# Patient Record
Sex: MALE | Race: WHITE | Employment: OTHER | URBAN - METROPOLITAN AREA
[De-identification: names, ages, dates, MRNs, and addresses within clinical notes are randomized per-mention and may not be internally consistent; named-entity substitution may affect disease eponyms.]

---

## 2017-01-19 ENCOUNTER — OFFICE VISIT (OUTPATIENT)
Dept: CARDIOLOGY CLINIC | Age: 77
End: 2017-01-19

## 2017-01-19 VITALS
BODY MASS INDEX: 27.16 KG/M2 | WEIGHT: 194 LBS | HEART RATE: 44 BPM | DIASTOLIC BLOOD PRESSURE: 92 MMHG | SYSTOLIC BLOOD PRESSURE: 164 MMHG | HEIGHT: 71 IN

## 2017-01-19 DIAGNOSIS — I42.9 CARDIOMYOPATHY (HCC): ICD-10-CM

## 2017-01-19 DIAGNOSIS — I48.0 PAF (PAROXYSMAL ATRIAL FIBRILLATION) (HCC): ICD-10-CM

## 2017-01-19 DIAGNOSIS — I25.119 CORONARY ARTERY DISEASE INVOLVING NATIVE CORONARY ARTERY OF NATIVE HEART WITH ANGINA PECTORIS (HCC): ICD-10-CM

## 2017-01-19 DIAGNOSIS — E78.2 MIXED HYPERLIPIDEMIA: ICD-10-CM

## 2017-01-19 DIAGNOSIS — I25.9 ISCHEMIC HEART DISEASE: ICD-10-CM

## 2017-01-19 DIAGNOSIS — R00.2 PALPITATIONS: ICD-10-CM

## 2017-01-19 DIAGNOSIS — I38 VHD (VALVULAR HEART DISEASE): ICD-10-CM

## 2017-01-19 DIAGNOSIS — E78.5 HYPERLIPIDEMIA, UNSPECIFIED HYPERLIPIDEMIA TYPE: ICD-10-CM

## 2017-01-19 DIAGNOSIS — Z98.61 S/P PTCA (PERCUTANEOUS TRANSLUMINAL CORONARY ANGIOPLASTY): ICD-10-CM

## 2017-01-19 DIAGNOSIS — I25.10 CORONARY ARTERY DISEASE INVOLVING NATIVE CORONARY ARTERY OF NATIVE HEART WITHOUT ANGINA PECTORIS: Primary | ICD-10-CM

## 2017-01-19 DIAGNOSIS — I47.9 PAROXYSMAL TACHYCARDIA (HCC): ICD-10-CM

## 2017-01-19 PROCEDURE — 99214 OFFICE O/P EST MOD 30 MIN: CPT | Performed by: INTERNAL MEDICINE

## 2017-01-19 RX ORDER — FLUOXETINE HYDROCHLORIDE 40 MG/1
40 CAPSULE ORAL DAILY
COMMUNITY
Start: 2016-11-27 | End: 2019-06-24 | Stop reason: SDUPTHER

## 2017-01-19 RX ORDER — LOSARTAN POTASSIUM 100 MG/1
100 TABLET ORAL DAILY
Qty: 30 TABLET | Refills: 5 | Status: SHIPPED | OUTPATIENT
Start: 2017-01-19 | End: 2017-07-29 | Stop reason: SDUPTHER

## 2017-01-23 LAB
ALBUMIN SERPL-MCNC: 4.1 G/DL
ALP BLD-CCNC: 53 U/L
ALT SERPL-CCNC: 21 U/L
AST SERPL-CCNC: 17 U/L
BILIRUB SERPL-MCNC: 1.1 MG/DL (ref 0.1–1.4)
BUN BLDV-MCNC: NORMAL MG/DL
CALCIUM SERPL-MCNC: 8.8 MG/DL
CHLORIDE BLD-SCNC: 103 MMOL/L
CO2: 25 MMOL/L
CREAT SERPL-MCNC: 1 MG/DL
GFR CALCULATED: NORMAL
GLUCOSE BLD-MCNC: 125 MG/DL
POTASSIUM SERPL-SCNC: 4 MMOL/L
SODIUM BLD-SCNC: 137 MMOL/L
TOTAL PROTEIN: 7.4

## 2017-02-02 ENCOUNTER — NURSE ONLY (OUTPATIENT)
Dept: CARDIOLOGY CLINIC | Age: 77
End: 2017-02-02

## 2017-02-02 ENCOUNTER — TELEPHONE (OUTPATIENT)
Dept: CARDIOLOGY CLINIC | Age: 77
End: 2017-02-02

## 2017-02-02 VITALS
DIASTOLIC BLOOD PRESSURE: 74 MMHG | HEIGHT: 71 IN | WEIGHT: 198.8 LBS | HEART RATE: 60 BPM | BODY MASS INDEX: 27.83 KG/M2 | SYSTOLIC BLOOD PRESSURE: 118 MMHG

## 2017-02-02 DIAGNOSIS — I10 ESSENTIAL HYPERTENSION: Primary | ICD-10-CM

## 2017-02-02 PROCEDURE — 99211 OFF/OP EST MAY X REQ PHY/QHP: CPT | Performed by: INTERNAL MEDICINE

## 2017-02-09 ENCOUNTER — TELEPHONE (OUTPATIENT)
Dept: CARDIOLOGY CLINIC | Age: 77
End: 2017-02-09

## 2017-02-10 ENCOUNTER — TELEPHONE (OUTPATIENT)
Dept: CARDIOLOGY CLINIC | Age: 77
End: 2017-02-10

## 2017-02-27 ENCOUNTER — TELEPHONE (OUTPATIENT)
Dept: CARDIOLOGY CLINIC | Age: 77
End: 2017-02-27

## 2017-06-16 ENCOUNTER — HOSPITAL ENCOUNTER (OUTPATIENT)
Dept: SLEEP CENTER | Age: 77
Discharge: HOME | End: 2017-06-16
Attending: FAMILY MEDICINE | Admitting: FAMILY MEDICINE

## 2017-06-25 ENCOUNTER — HOSPITAL ENCOUNTER (OUTPATIENT)
Dept: SLEEP CENTER | Age: 77
Discharge: HOME | End: 2017-06-25
Attending: INTERNAL MEDICINE | Admitting: INTERNAL MEDICINE

## 2017-07-10 ENCOUNTER — OFFICE VISIT (OUTPATIENT)
Dept: CARDIOLOGY CLINIC | Age: 77
End: 2017-07-10

## 2017-07-10 VITALS
HEART RATE: 52 BPM | SYSTOLIC BLOOD PRESSURE: 136 MMHG | WEIGHT: 201.4 LBS | HEIGHT: 71 IN | DIASTOLIC BLOOD PRESSURE: 80 MMHG | BODY MASS INDEX: 28.19 KG/M2

## 2017-07-10 DIAGNOSIS — E78.2 MIXED HYPERLIPIDEMIA: ICD-10-CM

## 2017-07-10 DIAGNOSIS — I42.9 CARDIOMYOPATHY (HCC): ICD-10-CM

## 2017-07-10 DIAGNOSIS — I48.0 PAF (PAROXYSMAL ATRIAL FIBRILLATION) (HCC): ICD-10-CM

## 2017-07-10 DIAGNOSIS — I25.9 ISCHEMIC HEART DISEASE: ICD-10-CM

## 2017-07-10 DIAGNOSIS — I38 VHD (VALVULAR HEART DISEASE): ICD-10-CM

## 2017-07-10 DIAGNOSIS — Z98.61 S/P PTCA (PERCUTANEOUS TRANSLUMINAL CORONARY ANGIOPLASTY): ICD-10-CM

## 2017-07-10 DIAGNOSIS — I25.10 CORONARY ARTERY DISEASE INVOLVING NATIVE CORONARY ARTERY OF NATIVE HEART WITHOUT ANGINA PECTORIS: Primary | ICD-10-CM

## 2017-07-10 PROCEDURE — 99213 OFFICE O/P EST LOW 20 MIN: CPT | Performed by: INTERNAL MEDICINE

## 2017-07-10 RX ORDER — FINASTERIDE 5 MG/1
5 TABLET, FILM COATED ORAL DAILY
COMMUNITY
End: 2019-06-24 | Stop reason: SDUPTHER

## 2017-07-14 ENCOUNTER — HOSPITAL ENCOUNTER (OUTPATIENT)
Dept: SLEEP CENTER | Age: 77
Discharge: HOME | End: 2017-07-14
Attending: INTERNAL MEDICINE | Admitting: INTERNAL MEDICINE

## 2017-07-31 RX ORDER — LOSARTAN POTASSIUM 100 MG/1
TABLET ORAL
Qty: 30 TABLET | Refills: 4 | Status: SHIPPED | OUTPATIENT
Start: 2017-07-31 | End: 2018-03-07 | Stop reason: SDUPTHER

## 2017-10-16 LAB
ALBUMIN SERPL-MCNC: 4.5 G/DL
ALP BLD-CCNC: 50 U/L
ALT SERPL-CCNC: 15 U/L
ANION GAP SERPL CALCULATED.3IONS-SCNC: NORMAL MMOL/L
AST SERPL-CCNC: 15 U/L
BILIRUB SERPL-MCNC: 0.7 MG/DL (ref 0.1–1.4)
BUN BLDV-MCNC: 18 MG/DL
CALCIUM SERPL-MCNC: 9.5 MG/DL
CHLORIDE BLD-SCNC: 103 MMOL/L
CHOLESTEROL, TOTAL: 134 MG/DL
CHOLESTEROL/HDL RATIO: ABNORMAL
CO2: 29 MMOL/L
CREAT SERPL-MCNC: 0.9 MG/DL
GFR CALCULATED: NORMAL
GLUCOSE BLD-MCNC: 118 MG/DL
HDLC SERPL-MCNC: 27 MG/DL (ref 35–70)
LDL CHOLESTEROL CALCULATED: 73 MG/DL (ref 0–160)
POTASSIUM SERPL-SCNC: 4.6 MMOL/L
SODIUM BLD-SCNC: 142 MMOL/L
TOTAL PROTEIN: 7.1
TRIGL SERPL-MCNC: 168 MG/DL
VLDLC SERPL CALC-MCNC: 34 MG/DL

## 2018-01-09 ENCOUNTER — OFFICE VISIT (OUTPATIENT)
Dept: CARDIOLOGY CLINIC | Age: 78
End: 2018-01-09

## 2018-01-09 VITALS
BODY MASS INDEX: 28.64 KG/M2 | SYSTOLIC BLOOD PRESSURE: 150 MMHG | HEART RATE: 58 BPM | WEIGHT: 204.6 LBS | HEIGHT: 71 IN | DIASTOLIC BLOOD PRESSURE: 80 MMHG

## 2018-01-09 DIAGNOSIS — E78.2 MIXED HYPERLIPIDEMIA: ICD-10-CM

## 2018-01-09 DIAGNOSIS — I38 VHD (VALVULAR HEART DISEASE): ICD-10-CM

## 2018-01-09 DIAGNOSIS — I25.5 ISCHEMIC CARDIOMYOPATHY: ICD-10-CM

## 2018-01-09 DIAGNOSIS — I25.10 CORONARY ARTERY DISEASE INVOLVING NATIVE CORONARY ARTERY OF NATIVE HEART WITHOUT ANGINA PECTORIS: Primary | ICD-10-CM

## 2018-01-09 DIAGNOSIS — I25.9 ISCHEMIC HEART DISEASE: ICD-10-CM

## 2018-01-09 DIAGNOSIS — Z98.61 S/P PTCA (PERCUTANEOUS TRANSLUMINAL CORONARY ANGIOPLASTY): ICD-10-CM

## 2018-01-09 DIAGNOSIS — I48.0 PAF (PAROXYSMAL ATRIAL FIBRILLATION) (HCC): ICD-10-CM

## 2018-01-09 PROCEDURE — 99214 OFFICE O/P EST MOD 30 MIN: CPT | Performed by: INTERNAL MEDICINE

## 2018-01-09 NOTE — PROGRESS NOTES
tablet Take 10 mg by mouth daily      atorvastatin (LIPITOR) 10 MG tablet Take 20 mg by mouth daily       levothyroxine (LEVOTHROID) 50 MCG tablet Take 50 mcg by mouth daily.  aspirin 81 MG tablet Take 81 mg by mouth daily. No current facility-administered medications for this visit. Allergies: Lisinopril and Pcn [penicillins]  Past Medical History:   Diagnosis Date    CAD (coronary artery disease)     Cardiac arrhythmia     Cardiomyopathy [facility])     Fall 10/2013    Was walking and missed his step and fell.  H/O cardiovascular stress test 07/08/2016    EF 53% Normal study    H/O echocardiogram 03/11/2013    EF =>55%, abnormal LV diastolic function Stage 1, mild mitral and triuspid regurg,normal LV systolic function, no pericarial effusion.     H/O exercise stress test 05/14/2013    EXERCISE STRESS-negative stress test    History of Holter monitoring 7/14/14    1 episode of a-fib x 11 hours and 58 minutes    Hyperlipidemia     Ischemic heart disease     SILENT    [facility]     Palpitations     S/P PTCA (percutaneous transluminal coronary angioplasty) 11/15/2012    stenting of 2 sites in RCA    Status post angioplasty with stent     VHD (valvular heart disease)      Past Surgical History:   Procedure Laterality Date    CHOLECYSTECTOMY  2009    PTCA  11/2012    TONSILLECTOMY AND ADENOIDECTOMY        As reviewed   Family History   Problem Relation Age of Onset    Tuberculosis Mother     Heart Surgery Father     Pacemaker Father      Social History   Substance Use Topics    Smoking status: Former Smoker     Packs/day: 1.00     Years: 10.00     Quit date: 10/29/1967    Smokeless tobacco: Never Used    Alcohol use 0.0 oz/week      Comment: RARE, 2 cups coffee daily      Review of Systems:    Constitutional: Negative for diaphoresis and fatigue  Psychological:Negative for anxiety or depression  HENT: Negative for headaches, nasal congestion, sinus pain or vertigo  Eyes: Negative for visual disturbance. Endocrine: Negative for polydipsia/polyuria  Respiratory: Negative for shortness of breath  Cardiovascular: Negative for chest pain, dyspnea on exertion, claudication, edema, irregular heartbeat, murmur, palpitations or shortness of breath  Gastrointestinal: Negative for abdominal pain or heartburn  Genito-Urinary: Negative for urinary frequency/urgency  Musculoskeletal: Negative for muscle pain, muscular weakness, negative for pain in arm and leg or swelling in foot and leg  Neurological: Negative for dizziness, headaches, memory loss, numbness/tingling, visual changes, syncope  Dermatological: Negative for rash    Objective:  BP (!) 150/80   Pulse 58   Ht 5' 11\" (1.803 m)   Wt 204 lb 9.6 oz (92.8 kg)   BMI 28.54 kg/m²   Wt Readings from Last 3 Encounters:   01/09/18 204 lb 9.6 oz (92.8 kg)   07/10/17 201 lb 6.4 oz (91.4 kg)   02/02/17 198 lb 12.8 oz (90.2 kg)     Body mass index is 28.54 kg/m². GENERAL - Alert, oriented, pleasant, in no apparent distress. EYES: No jaundice, no conjunctival pallor. SKIN: It is warm & dry. No rashes. No Echhymosis    HEENT  No clinically significant abnormalities seen. Neck - Supple. No jugular venous distention noted. No carotid bruits. Cardiovascular  Normal S1 and S2 without obvious murmur or gallop. Extremities - No cyanosis, clubbing, or significant edema. Pulmonary  No respiratory distress. No wheezes or rales. Abdomen  No masses, tenderness, or organomegaly. Musculoskeletal  No significant edema. No joint deformities. No muscle wasting. Neurologic  Cranial nerves II through XII are grossly intact. There were no gross focal neurologic abnormalities.     Lab Review   No results found for: CKTOTAL, CKMB, CKMBINDEX, TROPONINI  BNP:  No results found for: BNP  PT/INR:    Lab Results   Component Value Date    INR 1.13 11/12/2012     Lab Results   Component Value Date    LABA1C 6.9

## 2018-01-09 NOTE — PATIENT INSTRUCTIONS
CAD:Yes   clinically stable. Patient is on optimal medical regimen ( see medication list above )  -     CORONARY ARTERY DISEASE: Patient is currently  asymptomatic from CAD. - changes in  treatment:   no           - Testing ordered:  no  [facility] classification: 1  [place] RISK SCORE:  ROSALIE RISK SCORE:  HTN: Not well controlled needs medication adjustment. But Patient says BP at home is always good. - changes in  treatment:   no   CARDIOMYOPATHY: None known CONGESTIVE HEART FAILURE: NO KNOWN HISTORY.       VHD: No significant VHD noted  DYSLIPIDEMIA: Patient's profile is at / near Goal.yes,                                 HDL is very low                                Tolerating current medical regimen wellyes,                                                         See most recent Lab values in Labs section above. OTHER RELEVANT DIAGNOSIS:as noted in patient's active problem list:  TESTS ORDERED: None this visit                                              All previously ordered tests reviewed. ARRHYTHMIAS:  Patient has H/O P. Atrial fibrillation                                He is rate controlled & on anticoagulation. Yesi Montero MEDICATIONS: CPM. BP check with nurse in a week with his home BP cuff comparison. Office f/u in six months. Primary/secondary prevention is the goal by aggressive risk modification, healthy and therapeutic life style changes for cardiovascular risk reduction. Various goals are discussed and multiple questions answered.

## 2018-01-16 ENCOUNTER — NURSE ONLY (OUTPATIENT)
Dept: CARDIOLOGY CLINIC | Age: 78
End: 2018-01-16

## 2018-01-16 VITALS
DIASTOLIC BLOOD PRESSURE: 70 MMHG | HEIGHT: 71 IN | WEIGHT: 205.8 LBS | HEART RATE: 58 BPM | BODY MASS INDEX: 28.81 KG/M2 | SYSTOLIC BLOOD PRESSURE: 138 MMHG

## 2018-01-16 DIAGNOSIS — I10 ESSENTIAL HYPERTENSION: Primary | ICD-10-CM

## 2018-01-16 PROCEDURE — 99211 OFF/OP EST MAY X REQ PHY/QHP: CPT | Performed by: INTERNAL MEDICINE

## 2018-01-16 NOTE — PROGRESS NOTES
Dr. Paty Haider reviewed blood pressure. Stated that the patient needs too keep checking on it at home if it goes over 150 and stays over 150 he should call us.

## 2018-03-07 RX ORDER — LOSARTAN POTASSIUM 100 MG/1
100 TABLET ORAL DAILY
Qty: 30 TABLET | Refills: 6 | Status: SHIPPED | OUTPATIENT
Start: 2018-03-07 | End: 2018-09-30 | Stop reason: SDUPTHER

## 2018-06-25 LAB
ALBUMIN SERPL-MCNC: NORMAL G/DL
ALP BLD-CCNC: NORMAL U/L
ALT SERPL-CCNC: NORMAL U/L
ANION GAP SERPL CALCULATED.3IONS-SCNC: 8 MMOL/L
AST SERPL-CCNC: NORMAL U/L
BASOPHILS ABSOLUTE: ABNORMAL /ΜL
BASOPHILS RELATIVE PERCENT: ABNORMAL %
BILIRUB SERPL-MCNC: NORMAL MG/DL (ref 0.1–1.4)
BUN BLDV-MCNC: NORMAL MG/DL
CALCIUM SERPL-MCNC: 8.3 MG/DL
CHLORIDE BLD-SCNC: 97 MMOL/L
CO2: 25 MMOL/L
CREAT SERPL-MCNC: NORMAL MG/DL
EOSINOPHILS ABSOLUTE: ABNORMAL /ΜL
EOSINOPHILS RELATIVE PERCENT: ABNORMAL %
GFR CALCULATED: NORMAL
GLUCOSE BLD-MCNC: NORMAL MG/DL
HCT VFR BLD CALC: 38.1 % (ref 41–53)
HEMOGLOBIN: 12.5 G/DL (ref 13.5–17.5)
LYMPHOCYTES ABSOLUTE: ABNORMAL /ΜL
LYMPHOCYTES RELATIVE PERCENT: ABNORMAL %
MCH RBC QN AUTO: ABNORMAL PG
MCHC RBC AUTO-ENTMCNC: ABNORMAL G/DL
MCV RBC AUTO: ABNORMAL FL
MONOCYTES ABSOLUTE: ABNORMAL /ΜL
MONOCYTES RELATIVE PERCENT: ABNORMAL %
NEUTROPHILS ABSOLUTE: ABNORMAL /ΜL
NEUTROPHILS RELATIVE PERCENT: ABNORMAL %
PLATELET # BLD: 155 K/ΜL
PMV BLD AUTO: 9.9 FL
POTASSIUM SERPL-SCNC: 3.8 MMOL/L
RBC # BLD: 4.29 10^6/ΜL
SODIUM BLD-SCNC: 130 MMOL/L
TOTAL PROTEIN: NORMAL
WBC # BLD: 3.6 10^3/ML

## 2018-08-04 ENCOUNTER — OFFICE VISIT (OUTPATIENT)
Dept: CARDIOLOGY CLINIC | Age: 78
End: 2018-08-04

## 2018-08-04 VITALS
DIASTOLIC BLOOD PRESSURE: 82 MMHG | HEIGHT: 71 IN | BODY MASS INDEX: 28.28 KG/M2 | SYSTOLIC BLOOD PRESSURE: 172 MMHG | HEART RATE: 46 BPM | WEIGHT: 202 LBS

## 2018-08-04 DIAGNOSIS — I25.10 CORONARY ARTERY DISEASE INVOLVING NATIVE HEART WITHOUT ANGINA PECTORIS, UNSPECIFIED VESSEL OR LESION TYPE: Primary | ICD-10-CM

## 2018-08-04 DIAGNOSIS — I25.9 ISCHEMIC HEART DISEASE: ICD-10-CM

## 2018-08-04 DIAGNOSIS — I48.0 PAF (PAROXYSMAL ATRIAL FIBRILLATION) (HCC): ICD-10-CM

## 2018-08-04 DIAGNOSIS — Z98.61 S/P PTCA (PERCUTANEOUS TRANSLUMINAL CORONARY ANGIOPLASTY): ICD-10-CM

## 2018-08-04 DIAGNOSIS — I25.5 ISCHEMIC CARDIOMYOPATHY: ICD-10-CM

## 2018-08-04 DIAGNOSIS — E78.2 MIXED HYPERLIPIDEMIA: ICD-10-CM

## 2018-08-04 PROCEDURE — 93000 ELECTROCARDIOGRAM COMPLETE: CPT | Performed by: INTERNAL MEDICINE

## 2018-08-04 PROCEDURE — 99213 OFFICE O/P EST LOW 20 MIN: CPT | Performed by: INTERNAL MEDICINE

## 2018-08-04 RX ORDER — AMLODIPINE BESYLATE 5 MG/1
5 TABLET ORAL DAILY
Qty: 30 TABLET | Refills: 5 | Status: SHIPPED | OUTPATIENT
Start: 2018-08-04 | End: 2019-01-28 | Stop reason: SDUPTHER

## 2018-08-04 NOTE — PROGRESS NOTES
(REQUIP) 0.5 MG tablet TAKE ONE TABLET BY MOUTH EVERY NIGHT AT BEDTIME 30 tablet 1    rivaroxaban (XARELTO) 20 MG TABS tablet Take 1 tablet by mouth every 24 hours 90 tablet 3    HYDROCODONE BITARTRATE PO Take by mouth       No current facility-administered medications for this visit. Allergies: Lisinopril and Pcn [penicillins]  Past Medical History:   Diagnosis Date    CAD (coronary artery disease)     Cardiac arrhythmia     Cardiomyopathy [facility])     Fall 10/2013    Was walking and missed his step and fell.  H/O cardiovascular stress test 07/08/2016    EF 53% Normal study    H/O echocardiogram 03/11/2013    EF =>55%, abnormal LV diastolic function Stage 1, mild mitral and triuspid regurg,normal LV systolic function, no pericarial effusion.     H/O exercise stress test 05/14/2013    EXERCISE STRESS-negative stress test    History of Holter monitoring 7/14/14    1 episode of a-fib x 11 hours and 58 minutes    Hyperlipidemia     Ischemic heart disease     SILENT    PAF (paroxysmal atrial fibrillation) (HCC)     Palpitations     S/P PTCA (percutaneous transluminal coronary angioplasty) 11/15/2012    stenting of 2 sites in RCA    Status post angioplasty with stent     VHD (valvular heart disease)      Past Surgical History:   Procedure Laterality Date    CHOLECYSTECTOMY  2009    PTCA  11/2012    TONSILLECTOMY AND ADENOIDECTOMY        As reviewed   Family History   Problem Relation Age of Onset    Tuberculosis Mother     Heart Surgery Father     Pacemaker Father      Social History   Substance Use Topics    Smoking status: Former Smoker     Packs/day: 1.00     Years: 10.00     Quit date: 10/29/1967    Smokeless tobacco: Never Used    Alcohol use 0.0 oz/week      Comment: RARE, 2 cups coffee daily      Review of Systems:    Constitutional: Negative for diaphoresis and fatigue  Psychological:Negative for anxiety or depression  HENT: Negative for headaches, nasal congestion, sinus pain or Results   Component Value Date    LABA1C 6.9 12/31/2012     Lab Results   Component Value Date    WBC 3.6 06/25/2018    HCT 38.1 (A) 06/25/2018    MCV 90.4 01/10/2016     06/25/2018     Lab Results   Component Value Date    CHOL 134 10/16/2017    TRIG 168 10/16/2017    HDL 27 (A) 10/16/2017    LDLCALC 73 10/16/2017    LDLDIRECT 75 05/13/2013     Lab Results   Component Value Date    ALT 15 10/16/2017    AST 15 10/16/2017     BMP:    Lab Results   Component Value Date     06/25/2018    K 3.8 06/25/2018    CL 97 06/25/2018    CO2 25 06/25/2018    BUN 18 10/16/2017    CREATININE 0.9 10/16/2017     CMP:   Lab Results   Component Value Date     06/25/2018    K 3.8 06/25/2018    CL 97 06/25/2018    CO2 25 06/25/2018    BUN 18 10/16/2017    PROT 7.0 07/09/2014    PROT 6.9 12/09/2013     TSH:    Lab Results   Component Value Date    TSHHS 6.080 01/10/2016     EKG: Sinus Bradycardia 46 / min. Otherwise normal.    QUALITY MEASURES REVIEWED:  1.CAD:Patient is taking anti platelet agent:Yes  2. DYSLIPIDEMIA: Patient is on cholesterol lowering medication:Yes  3. Beta-Blocker therapy for CAD, if prior Myocardial Infarction:Yes  4. Atrial fibrillation & anticoagulation therapy No  5. Discussed weight management strategies. Impression:    1. Coronary artery disease involving native heart without angina pectoris, unspecified vessel or lesion type    2. Mixed hyperlipidemia    3. Ischemic heart disease    4. Ischemic cardiomyopathy    5. S/P PTCA (percutaneous transluminal coronary angioplasty)    6.  PAF (paroxysmal atrial fibrillation) [facility])       Patient Active Problem List   Diagnosis Code    Hyperlipidemia E78.5    Ischemic heart disease I25.9    Cardiomyopathy ([facility] [address] I42.9    VHD (valvular heart disease) I38    CAD (coronary artery disease) I25.10    S/P PTCA (percutaneous transluminal coronary angioplasty) Z98.61    S/P PTCA (percutaneous transluminal coronary angioplasty) Z98.61    H/O echocardiogram

## 2018-08-04 NOTE — LETTER
Cardiology [address] [place] [id number]  Phone: [phone]  Fax: [phone]    Paulette Bryant MD        August 4, 2018     MD Beatrice Andre [address]    Patient: Alberto Brown  MR Number: [id number]  YOB: 1940  Date of Visit: 8/4/2018    Dear Dr. Karina Short: Thank you for the request for consultation for Jordan Franklin to me for the evaluation of CAD / htn. Below are the relevant portions of my assessment and plan of care. If you have questions, please do not hesitate to call me. I look forward to following Yash Lopez along with you.     Sincerely,        Paulette Bryant MD

## 2018-08-04 NOTE — PATIENT INSTRUCTIONS
CAD:Yes   clinically stable. Patient is on optimal medical regimen ( see medication list above )  -     CORONARY ARTERY DISEASE: Patient is currently  asymptomatic from CAD. - changes in  treatment:   no           - Testing ordered:  no  [facility] classification: 1  [place] RISK SCORE:  ROSALIE RISK SCORE:  HTN:not well controlled on current medical regimen.              - changes in  treatment: yes   CARDIOMYOPATHY: None known   CONGESTIVE HEART FAILURE: NO KNOWN HISTORY.      VHD: No significant VHD noted  DYSLIPIDEMIA: Patient's profile is at / near Goal.yes,                                 HDL is low                                Tolerating current medical regimen wellyes,                                                               See most recent Lab values in Labs section above. OTHER RELEVANT DIAGNOSIS:as noted in patient's active problem list:  TESTS ORDERED:   Echocardiogram                                      All previously ordered tests reviewed. ARRHYTHMIAS: known                                Patient has H/O Atrial fibrillation                                He is rate controlled & on anticoagulation. Patient is in NSR . MEDICATIONS: CPM   Office f/u in six months. Primary/secondary prevention is the goal by aggressive risk modification, healthy and therapeutic life style changes for cardiovascular risk reduction. Various goals are discussed and multiple questions answered.

## 2018-08-20 ENCOUNTER — PROCEDURE VISIT (OUTPATIENT)
Dept: CARDIOLOGY CLINIC | Age: 78
End: 2018-08-20

## 2018-08-20 DIAGNOSIS — I25.5 ISCHEMIC CARDIOMYOPATHY: ICD-10-CM

## 2018-08-20 DIAGNOSIS — Z98.61 S/P PTCA (PERCUTANEOUS TRANSLUMINAL CORONARY ANGIOPLASTY): ICD-10-CM

## 2018-08-20 DIAGNOSIS — E78.2 MIXED HYPERLIPIDEMIA: ICD-10-CM

## 2018-08-20 DIAGNOSIS — I25.10 CORONARY ARTERY DISEASE INVOLVING NATIVE HEART WITHOUT ANGINA PECTORIS, UNSPECIFIED VESSEL OR LESION TYPE: Primary | ICD-10-CM

## 2018-08-20 DIAGNOSIS — I25.9 ISCHEMIC HEART DISEASE: ICD-10-CM

## 2018-08-20 DIAGNOSIS — I48.0 PAF (PAROXYSMAL ATRIAL FIBRILLATION) (HCC): ICD-10-CM

## 2018-08-20 LAB
LV EF: 58 %
LVEF MODALITY: NORMAL

## 2018-08-20 PROCEDURE — 93306 TTE W/DOPPLER COMPLETE: CPT | Performed by: INTERNAL MEDICINE

## 2018-08-21 ENCOUNTER — TELEPHONE (OUTPATIENT)
Dept: CARDIOLOGY CLINIC | Age: 78
End: 2018-08-21

## 2018-10-03 RX ORDER — LOSARTAN POTASSIUM 100 MG/1
100 TABLET ORAL DAILY
Qty: 30 TABLET | Refills: 6 | Status: SHIPPED | OUTPATIENT
Start: 2018-10-03 | End: 2019-06-24 | Stop reason: SDUPTHER

## 2019-01-10 LAB — TSH SERPL DL<=0.05 MIU/L-ACNC: 2.65 UIU/ML

## 2019-01-29 RX ORDER — AMLODIPINE BESYLATE 5 MG/1
5 TABLET ORAL DAILY
Qty: 30 TABLET | Refills: 6 | Status: SHIPPED | OUTPATIENT
Start: 2019-01-29 | End: 2019-08-26 | Stop reason: SDUPTHER

## 2019-02-18 ENCOUNTER — OFFICE VISIT (OUTPATIENT)
Dept: CARDIOLOGY CLINIC | Age: 79
End: 2019-02-18
Payer: MEDICARE

## 2019-02-18 VITALS
HEIGHT: 71 IN | DIASTOLIC BLOOD PRESSURE: 70 MMHG | HEART RATE: 64 BPM | WEIGHT: 203 LBS | SYSTOLIC BLOOD PRESSURE: 132 MMHG | BODY MASS INDEX: 28.42 KG/M2

## 2019-02-18 DIAGNOSIS — Z98.61 S/P PTCA (PERCUTANEOUS TRANSLUMINAL CORONARY ANGIOPLASTY): ICD-10-CM

## 2019-02-18 DIAGNOSIS — I48.0 PAF (PAROXYSMAL ATRIAL FIBRILLATION) (HCC): ICD-10-CM

## 2019-02-18 DIAGNOSIS — E78.2 MIXED HYPERLIPIDEMIA: ICD-10-CM

## 2019-02-18 DIAGNOSIS — I25.9 ISCHEMIC HEART DISEASE: ICD-10-CM

## 2019-02-18 DIAGNOSIS — I25.10 CORONARY ARTERY DISEASE INVOLVING NATIVE CORONARY ARTERY OF NATIVE HEART WITHOUT ANGINA PECTORIS: Primary | ICD-10-CM

## 2019-02-18 DIAGNOSIS — I38 VHD (VALVULAR HEART DISEASE): ICD-10-CM

## 2019-02-18 DIAGNOSIS — I25.5 ISCHEMIC CARDIOMYOPATHY: ICD-10-CM

## 2019-02-18 PROCEDURE — 99214 OFFICE O/P EST MOD 30 MIN: CPT | Performed by: INTERNAL MEDICINE

## 2019-02-18 RX ORDER — MEMANTINE HYDROCHLORIDE 5 MG/1
TABLET ORAL
COMMUNITY
Start: 2019-01-28 | End: 2021-11-08

## 2019-02-18 RX ORDER — ATORVASTATIN CALCIUM 20 MG/1
TABLET, FILM COATED ORAL
COMMUNITY
Start: 2019-01-28 | End: 2019-06-24 | Stop reason: SDUPTHER

## 2019-06-04 DIAGNOSIS — F32.A DEPRESSIVE DISORDER: ICD-10-CM

## 2019-06-04 DIAGNOSIS — R73.9 HYPERGLYCEMIA: ICD-10-CM

## 2019-06-04 DIAGNOSIS — N52.9 IMPOTENCE: ICD-10-CM

## 2019-06-04 DIAGNOSIS — E78.00 HYPERCHOLESTEROLEMIA: ICD-10-CM

## 2019-06-04 DIAGNOSIS — I10 ESSENTIAL HYPERTENSION: ICD-10-CM

## 2019-06-24 ENCOUNTER — OFFICE VISIT (OUTPATIENT)
Dept: FAMILY MEDICINE CLINIC | Age: 79
End: 2019-06-24
Payer: MEDICARE

## 2019-06-24 VITALS
HEART RATE: 64 BPM | HEIGHT: 70 IN | DIASTOLIC BLOOD PRESSURE: 82 MMHG | WEIGHT: 201.6 LBS | BODY MASS INDEX: 28.86 KG/M2 | SYSTOLIC BLOOD PRESSURE: 132 MMHG

## 2019-06-24 DIAGNOSIS — E03.9 ACQUIRED HYPOTHYROIDISM: ICD-10-CM

## 2019-06-24 DIAGNOSIS — E11.9 TYPE 2 DIABETES MELLITUS WITHOUT COMPLICATION, WITHOUT LONG-TERM CURRENT USE OF INSULIN (HCC): Primary | ICD-10-CM

## 2019-06-24 DIAGNOSIS — E78.5 HYPERLIPIDEMIA, UNSPECIFIED HYPERLIPIDEMIA TYPE: ICD-10-CM

## 2019-06-24 DIAGNOSIS — E78.00 PURE HYPERCHOLESTEROLEMIA: ICD-10-CM

## 2019-06-24 DIAGNOSIS — I38 VHD (VALVULAR HEART DISEASE): ICD-10-CM

## 2019-06-24 DIAGNOSIS — I48.0 PAF (PAROXYSMAL ATRIAL FIBRILLATION) (HCC): ICD-10-CM

## 2019-06-24 DIAGNOSIS — R00.2 PALPITATIONS: ICD-10-CM

## 2019-06-24 DIAGNOSIS — I25.119 CORONARY ARTERY DISEASE INVOLVING NATIVE CORONARY ARTERY OF NATIVE HEART WITH ANGINA PECTORIS (HCC): ICD-10-CM

## 2019-06-24 DIAGNOSIS — I10 ESSENTIAL HYPERTENSION: ICD-10-CM

## 2019-06-24 DIAGNOSIS — I25.9 ISCHEMIC HEART DISEASE: ICD-10-CM

## 2019-06-24 DIAGNOSIS — I47.9 PAROXYSMAL TACHYCARDIA (HCC): ICD-10-CM

## 2019-06-24 DIAGNOSIS — E11.9 TYPE 2 DIABETES MELLITUS WITHOUT COMPLICATION, WITHOUT LONG-TERM CURRENT USE OF INSULIN (HCC): ICD-10-CM

## 2019-06-24 DIAGNOSIS — Z98.61 S/P PTCA (PERCUTANEOUS TRANSLUMINAL CORONARY ANGIOPLASTY): ICD-10-CM

## 2019-06-24 DIAGNOSIS — F32.A DEPRESSIVE DISORDER: ICD-10-CM

## 2019-06-24 DIAGNOSIS — I25.5 ISCHEMIC CARDIOMYOPATHY: ICD-10-CM

## 2019-06-24 LAB
A/G RATIO: 1.9 (ref 1.1–2.2)
ALBUMIN SERPL-MCNC: 4.5 G/DL (ref 3.4–5)
ALP BLD-CCNC: 47 U/L (ref 40–129)
ALT SERPL-CCNC: 13 U/L (ref 10–40)
ANION GAP SERPL CALCULATED.3IONS-SCNC: 14 MMOL/L (ref 3–16)
AST SERPL-CCNC: 15 U/L (ref 15–37)
BASOPHILS ABSOLUTE: 0.1 K/UL (ref 0–0.2)
BASOPHILS RELATIVE PERCENT: 1.1 %
BILIRUB SERPL-MCNC: 0.6 MG/DL (ref 0–1)
BUN BLDV-MCNC: 18 MG/DL (ref 7–20)
CALCIUM SERPL-MCNC: 9.3 MG/DL (ref 8.3–10.6)
CHLORIDE BLD-SCNC: 104 MMOL/L (ref 99–110)
CHOLESTEROL, TOTAL: 119 MG/DL (ref 0–199)
CO2: 23 MMOL/L (ref 21–32)
CREAT SERPL-MCNC: 0.9 MG/DL (ref 0.8–1.3)
EOSINOPHILS ABSOLUTE: 0.2 K/UL (ref 0–0.6)
EOSINOPHILS RELATIVE PERCENT: 4.3 %
GFR AFRICAN AMERICAN: >60
GFR NON-AFRICAN AMERICAN: >60
GLOBULIN: 2.4 G/DL
GLUCOSE BLD-MCNC: 142 MG/DL (ref 70–99)
HCT VFR BLD CALC: 40.5 % (ref 40.5–52.5)
HDLC SERPL-MCNC: 31 MG/DL (ref 40–60)
HEMOGLOBIN: 13.3 G/DL (ref 13.5–17.5)
LDL CHOLESTEROL CALCULATED: 58 MG/DL
LYMPHOCYTES ABSOLUTE: 0.7 K/UL (ref 1–5.1)
LYMPHOCYTES RELATIVE PERCENT: 14.7 %
MCH RBC QN AUTO: 30.5 PG (ref 26–34)
MCHC RBC AUTO-ENTMCNC: 32.9 G/DL (ref 31–36)
MCV RBC AUTO: 92.8 FL (ref 80–100)
MONOCYTES ABSOLUTE: 0.4 K/UL (ref 0–1.3)
MONOCYTES RELATIVE PERCENT: 7.7 %
NEUTROPHILS ABSOLUTE: 3.5 K/UL (ref 1.7–7.7)
NEUTROPHILS RELATIVE PERCENT: 72.2 %
PDW BLD-RTO: 14.5 % (ref 12.4–15.4)
PLATELET # BLD: 163 K/UL (ref 135–450)
PMV BLD AUTO: 8.9 FL (ref 5–10.5)
POTASSIUM SERPL-SCNC: 4.2 MMOL/L (ref 3.5–5.1)
RBC # BLD: 4.36 M/UL (ref 4.2–5.9)
SODIUM BLD-SCNC: 141 MMOL/L (ref 136–145)
TOTAL PROTEIN: 6.9 G/DL (ref 6.4–8.2)
TRIGL SERPL-MCNC: 152 MG/DL (ref 0–150)
TSH SERPL DL<=0.05 MIU/L-ACNC: 3.7 UIU/ML (ref 0.27–4.2)
VLDLC SERPL CALC-MCNC: 30 MG/DL
WBC # BLD: 4.8 K/UL (ref 4–11)

## 2019-06-24 PROCEDURE — 99214 OFFICE O/P EST MOD 30 MIN: CPT | Performed by: FAMILY MEDICINE

## 2019-06-24 RX ORDER — FINASTERIDE 5 MG/1
5 TABLET, FILM COATED ORAL DAILY
Qty: 90 TABLET | Refills: 1 | Status: SHIPPED | OUTPATIENT
Start: 2019-06-24 | End: 2020-07-01

## 2019-06-24 RX ORDER — FLUOXETINE HYDROCHLORIDE 40 MG/1
40 CAPSULE ORAL DAILY
Qty: 90 CAPSULE | Refills: 1 | Status: SHIPPED | OUTPATIENT
Start: 2019-06-24 | End: 2019-10-29 | Stop reason: SDUPTHER

## 2019-06-24 RX ORDER — ATORVASTATIN CALCIUM 20 MG/1
20 TABLET, FILM COATED ORAL DAILY
Qty: 90 TABLET | Refills: 1 | Status: SHIPPED | OUTPATIENT
Start: 2019-06-24 | End: 2019-10-29

## 2019-06-24 RX ORDER — LEVOTHYROXINE SODIUM 0.05 MG/1
50 TABLET ORAL DAILY
Qty: 90 TABLET | Refills: 1 | Status: SHIPPED | OUTPATIENT
Start: 2019-06-24 | End: 2019-10-29

## 2019-06-24 RX ORDER — LOSARTAN POTASSIUM 100 MG/1
100 TABLET ORAL DAILY
Qty: 90 TABLET | Refills: 1 | Status: SHIPPED | OUTPATIENT
Start: 2019-06-24 | End: 2019-10-29

## 2019-06-24 ASSESSMENT — ENCOUNTER SYMPTOMS
CHEST TIGHTNESS: 0
SINUS PRESSURE: 0
SHORTNESS OF BREATH: 0
CONSTIPATION: 0
ABDOMINAL PAIN: 0
SORE THROAT: 0
RHINORRHEA: 0
COUGH: 0
DIARRHEA: 0

## 2019-06-24 ASSESSMENT — PATIENT HEALTH QUESTIONNAIRE - PHQ9
SUM OF ALL RESPONSES TO PHQ QUESTIONS 1-9: 0
1. LITTLE INTEREST OR PLEASURE IN DOING THINGS: 0
SUM OF ALL RESPONSES TO PHQ QUESTIONS 1-9: 0
SUM OF ALL RESPONSES TO PHQ9 QUESTIONS 1 & 2: 0
2. FEELING DOWN, DEPRESSED OR HOPELESS: 0

## 2019-06-24 NOTE — PROGRESS NOTES
6/24/2019    Munira Mustafa    Chief Complaint   Patient presents with    6 Month Follow-Up     - no c/o       HPI  Gia Ireland is a 66 y.o. male who presents today with follow-up on marked multiple medical problems. Patient continues with his memory and behavior problems. He had forgotten how to wipe himself. His wife worked with him about him adult wipes and they have worked it out. Wife has multiple other issues such as continual humming continual strumming, she states that she has 2 have 3 word sentences for him to remember commands. Neurology continues to be giving them very helpful insights. Depression appears to be stable. Patient not having any new depressive episodes. Patient states that his sugars have been stable although he has no sugar checks to bring in. Since weight remains very stable. Wife states that he has a very good appetite. Patient feels his blood pressures have been good although he has no home blood pressure checks. REVIEW OF SYMPTOMS  Review of Systems   Constitutional: Negative for chills and fever. HENT: Negative for rhinorrhea, sinus pressure and sore throat. Respiratory: Negative for cough, chest tightness and shortness of breath. Gastrointestinal: Negative for abdominal pain, constipation and diarrhea. Genitourinary: Negative for dysuria and frequency. Musculoskeletal: Negative for myalgias. PAST MEDICAL HISTORY  Past Medical History:   Diagnosis Date    Atrial fibrillation ([facility] [address]     on xarelto    CAD (coronary artery disease)     Cardiomyopathy ([facility] [address]     Depressive disorder     major    Essential hypertension     Fall 10/2013    Was walking and missed his step and fell.  H/O cardiovascular stress test 07/08/2016    EF 53% Normal study    H/O echocardiogram 03/11/2013    EF =>55%, abnormal LV diastolic function Stage 1, mild mitral and triuspid regurg,normal LV systolic function, no pericarial effusion.     H/O exercise stress test Gets together: None     Attends Yazidism service: None     Active member of club or organization: None     Attends meetings of clubs or organizations: None     Relationship status: None    Intimate partner violence:     Fear of current or ex partner: None     Emotionally abused: None     Physically abused: None     Forced sexual activity: None   Other Topics Concern    None   Social History Narrative    None        SURGICAL HISTORY  Past Surgical History:   Procedure Laterality Date    ANUS SURGERY      anal fistula-70's    CHOLECYSTECTOMY  2009    PTCA  11/2012    TONSILLECTOMY AND ADENOIDECTOMY         CURRENT MEDICATIONS  Current Outpatient Medications   Medication Sig Dispense Refill    atorvastatin (LIPITOR) 20 MG tablet Take 1 tablet by mouth daily 90 tablet 1    finasteride (PROSCAR) 5 MG tablet Take 1 tablet by mouth daily 90 tablet 1    FLUoxetine (PROZAC) 40 MG capsule Take 1 capsule by mouth daily 90 capsule 1    levothyroxine (LEVOTHROID) 50 MCG tablet Take 1 tablet by mouth daily 90 tablet 1    losartan (COZAAR) 100 MG tablet Take 1 tablet by mouth daily 90 tablet 1    memantine (NAMENDA) 5 MG tablet       rivaroxaban (XARELTO) 20 MG TABS tablet Take 1 tablet by mouth every 24 hours 90 tablet 3    alfuzosin (UROXATRAL) 10 MG SR tablet Take 10 mg by mouth daily      aspirin 81 MG tablet Take 81 mg by mouth daily.  amLODIPine (NORVASC) 5 MG tablet Take 1 tablet by mouth daily 30 tablet 6     No current facility-administered medications for this visit. ALLERGIES  Allergies   Allergen Reactions    Lisinopril Other (See Comments)     Ace cough.  Pcn [Penicillins] Rash       PHYSICAL EXAM  /82   Pulse 64   Ht 5' 10\" (1.778 m)   Wt 201 lb 9.6 oz (91.4 kg)   BMI 28.93 kg/m²     Physical Exam   Constitutional: He is oriented to person, place, and time. He appears well-developed. HENT:   Head: Normocephalic.    Eyes: Conjunctivae and EOM are normal.   Neck: Neck supple. Cardiovascular: Normal rate, regular rhythm and normal heart sounds. Pulmonary/Chest: Effort normal and breath sounds normal.   Musculoskeletal: Normal range of motion. Neurological: He is alert and oriented to person, place, and time. Skin: Skin is warm and dry. Psychiatric: He has a normal mood and affect. Thought content normal.                ASSESSMENT & PLAN  1. Type 2 diabetes mellitus without complication, without long-term current use of insulin ([address]  Labs today and adjust medications off of results. 2. Essential hypertension  Refill meds. Keep medications the same for now. Asked patient to restart doing home blood pressure checks. 3. Depressive disorder  Continue with behavioral changes recommended through neurology for patient's unique memory and behavior issues. Patient wife seem to be responding well. 4. Pure hypercholesterolemia  Check labs today. Adjust medications off of results. 5. Acquired hypothyroidism  Check labs today. Adjust medications off of results. Refilled meds. To wife's complaints and gave empathy. Return in about 4 months (around 10/24/2019).             Electronically signed by Lety Ohara MD on 6/24/2019

## 2019-06-25 LAB
ESTIMATED AVERAGE GLUCOSE: 139.9 MG/DL
HBA1C MFR BLD: 6.5 %

## 2019-08-27 RX ORDER — AMLODIPINE BESYLATE 5 MG/1
5 TABLET ORAL DAILY
Qty: 30 TABLET | Refills: 5 | Status: SHIPPED | OUTPATIENT
Start: 2019-08-27 | End: 2020-02-24

## 2019-09-24 ENCOUNTER — TELEPHONE (OUTPATIENT)
Dept: FAMILY MEDICINE CLINIC | Age: 79
End: 2019-09-24

## 2019-09-30 ENCOUNTER — OFFICE VISIT (OUTPATIENT)
Dept: CARDIOLOGY CLINIC | Age: 79
End: 2019-09-30
Payer: MEDICARE

## 2019-09-30 VITALS
WEIGHT: 204 LBS | DIASTOLIC BLOOD PRESSURE: 70 MMHG | HEART RATE: 60 BPM | HEIGHT: 71 IN | SYSTOLIC BLOOD PRESSURE: 128 MMHG | BODY MASS INDEX: 28.56 KG/M2

## 2019-09-30 DIAGNOSIS — I48.19 PERSISTENT ATRIAL FIBRILLATION (HCC): ICD-10-CM

## 2019-09-30 DIAGNOSIS — I83.93 VARICOSE VEINS OF BOTH LOWER EXTREMITIES, UNSPECIFIED WHETHER COMPLICATED: ICD-10-CM

## 2019-09-30 DIAGNOSIS — E03.9 ACQUIRED HYPOTHYROIDISM: ICD-10-CM

## 2019-09-30 DIAGNOSIS — I48.0 PAF (PAROXYSMAL ATRIAL FIBRILLATION) (HCC): ICD-10-CM

## 2019-09-30 DIAGNOSIS — E11.9 TYPE 2 DIABETES MELLITUS WITHOUT COMPLICATION, WITHOUT LONG-TERM CURRENT USE OF INSULIN (HCC): ICD-10-CM

## 2019-09-30 DIAGNOSIS — I38 VHD (VALVULAR HEART DISEASE): ICD-10-CM

## 2019-09-30 DIAGNOSIS — I10 ESSENTIAL HYPERTENSION: ICD-10-CM

## 2019-09-30 DIAGNOSIS — I25.10 CORONARY ARTERY DISEASE INVOLVING NATIVE CORONARY ARTERY OF NATIVE HEART WITHOUT ANGINA PECTORIS: Primary | ICD-10-CM

## 2019-09-30 DIAGNOSIS — I25.5 ISCHEMIC CARDIOMYOPATHY: ICD-10-CM

## 2019-09-30 PROCEDURE — 99214 OFFICE O/P EST MOD 30 MIN: CPT | Performed by: INTERNAL MEDICINE

## 2019-09-30 RX ORDER — FINASTERIDE 5 MG/1
5 TABLET, FILM COATED ORAL DAILY
COMMUNITY

## 2019-09-30 RX ORDER — LEVOTHYROXINE SODIUM 0.05 MG/1
50 TABLET ORAL DAILY
COMMUNITY
End: 2019-10-29 | Stop reason: SDUPTHER

## 2019-09-30 RX ORDER — ATORVASTATIN CALCIUM 20 MG/1
20 TABLET, FILM COATED ORAL DAILY
COMMUNITY
End: 2019-10-29 | Stop reason: SDUPTHER

## 2019-09-30 RX ORDER — FLUOXETINE HYDROCHLORIDE 40 MG/1
40 CAPSULE ORAL DAILY
COMMUNITY
End: 2020-01-30 | Stop reason: SDUPTHER

## 2019-09-30 RX ORDER — LOSARTAN POTASSIUM 100 MG/1
100 TABLET ORAL DAILY
COMMUNITY
End: 2019-10-29 | Stop reason: SDUPTHER

## 2019-09-30 NOTE — PROGRESS NOTES
(percutaneous transluminal coronary angioplasty) 11/15/2012    stenting of 2 sites in RCA    Type 2 diabetes mellitus ([facility] [address]     Varicose veins of both lower extremities     VHD (valvular heart disease)      Past Surgical History:   Procedure Laterality Date    ANUS SURGERY      anal fistula-70's    CHOLECYSTECTOMY  2009    PTCA  2012    TONSILLECTOMY AND ADENOIDECTOMY        As reviewed   Family History   Problem Relation Age of Onset    Tuberculosis Mother     Heart Surgery Father     Pacemaker Father     Depression Daughter      Social History     Tobacco Use    Smoking status: Former Smoker     Packs/day: 1.00     Years: 13.00     Pack years: 13.00     Start date:      Last attempt to quit: 10/29/1967     Years since quittin.9    Smokeless tobacco: Never Used   Substance Use Topics    Alcohol use: Yes     Alcohol/week: 0.0 standard drinks     Comment: RARE, 2 cups coffee daily      Review of Systems:    Constitutional: Negative for diaphoresis and fatigue  Psychological:Negative for anxiety or depression  HENT: Negative for headaches, nasal congestion, sinus pain or vertigo  Eyes: Negative for visual disturbance.    Endocrine: Negative for polydipsia/polyuria  Respiratory: Negative for shortness of breath  Cardiovascular: Negative for chest pain, dyspnea on exertion, claudication, edema, irregular heartbeat, murmur, palpitations or shortness of breath  Gastrointestinal: Negative for abdominal pain or heartburn  Genito-Urinary: Negative for urinary frequency/urgency  Musculoskeletal: Negative for muscle pain, muscular weakness, negative for pain in arm and leg or swelling in foot and leg  Neurological: Negative for dizziness, headaches, memory loss, numbness/tingling, visual changes, syncope  Dermatological: Negative for rash    Objective:  /70   Pulse 60   Ht 5' 11\" (1.803 m)   Wt 204 lb (92.5 kg)   BMI 28.45 kg/m²   Wt Readings from Last 3 Encounters:   19 204 lb (92.5 kg)   06/24/19 201 lb 9.6 oz (91.4 kg)   02/18/19 203 lb (92.1 kg)     Body mass index is 28.45 kg/m². GENERAL - Alert, oriented, pleasant, in no apparent distress. EYES: No jaundice, no conjunctival pallor. SKIN: It is warm & dry. No rashes. No Echhymosis    HEENT - No clinically significant abnormalities seen. Neck - Supple. No jugular venous distention noted. No carotid bruits. Cardiovascular - Normal S1 and S2 without obvious murmur or gallop. Extremities - No cyanosis, clubbing, or significant edema. Pulmonary - No respiratory distress. No wheezes or rales. Abdomen - No masses, tenderness, or organomegaly. Musculoskeletal - No significant edema. No joint deformities. No muscle wasting. Neurologic - Cranial nerves II through XII are grossly intact. There were no gross focal neurologic abnormalities.     Lab Review   Lab Results   Component Value Date    TROPONINT <0.010 01/10/2016    TROPONINT <0.010 07/09/2014     BNP:  No results found for: BNP  PT/INR:    Lab Results   Component Value Date    INR 1.13 11/12/2012     Lab Results   Component Value Date    LABA1C 6.5 06/24/2019    LABA1C 6.9 12/31/2012     Lab Results   Component Value Date    WBC 4.8 06/24/2019    WBC 3.6 06/25/2018    HCT 40.5 06/24/2019    HCT 38.1 (A) 06/25/2018    MCV 92.8 06/24/2019    MCV 90.4 01/10/2016     06/24/2019     06/25/2018     Lab Results   Component Value Date    CHOL 119 06/24/2019    CHOL 134 10/16/2017    TRIG 152 (H) 06/24/2019    TRIG 168 10/16/2017    HDL 31 (L) 06/24/2019    HDL 27 (A) 10/16/2017    LDLCALC 58 06/24/2019    LDLCALC 73 10/16/2017    LDLDIRECT 75 05/13/2013    LDLDIRECT 75 11/15/2012     Lab Results   Component Value Date    ALT 13 06/24/2019    ALT 15 10/16/2017    AST 15 06/24/2019    AST 15 10/16/2017     BMP:    Lab Results   Component Value Date     06/24/2019     06/25/2018    K 4.2 06/24/2019    K 3.8 06/25/2018     06/24/2019    CL 97 06/25/2018 CO2 23 06/24/2019    CO2 25 06/25/2018    BUN 18 06/24/2019    BUN 18 10/16/2017    CREATININE 0.9 06/24/2019    CREATININE 0.9 10/16/2017     CMP:   Lab Results   Component Value Date     06/24/2019     06/25/2018    K 4.2 06/24/2019    K 3.8 06/25/2018     06/24/2019    CL 97 06/25/2018    CO2 23 06/24/2019    CO2 25 06/25/2018    BUN 18 06/24/2019    BUN 18 10/16/2017    PROT 6.9 06/24/2019    PROT 7.0 07/09/2014    PROT 6.9 12/09/2013     TSH:    Lab Results   Component Value Date    TSH 3.70 06/24/2019    TSH 2.650 01/10/2019    TSHHS 6.080 01/10/2016       QUALITY MEASURES REVIEWED:  1.CAD:Patient is taking anti platelet agent:Yes  2. DYSLIPIDEMIA: Patient is on cholesterol lowering medication:Yes  3. Beta-Blocker therapy for CAD, if prior Myocardial Infarction:Yes  4. Atrial fibrillation & anticoagulation therapy Yes  5. Discussed weight management strategies. Impression:    1. Coronary artery disease involving native coronary artery of native heart without angina pectoris    2. Ischemic cardiomyopathy    3. VHD (valvular heart disease)    4. PAF (paroxysmal atrial fibrillation) ([facility]    5. Varicose veins of both lower extremities, unspecified whether complicated    6. Type 2 diabetes mellitus without complication, without long-term current use of insulin ([facility])    7. Persistent atrial fibrillation    8. Essential hypertension    9.  Acquired hypothyroidism       Patient Active Problem List   Diagnosis Code    Pure hypercholesterolemia E78.00    Cardiomyopathy ([facility] I42.9    VHD (valvular heart disease) I38    CAD (coronary artery disease) I25.10    H/O echocardiogram Z92.89    H/O exercise stress test Z92.89    PAF (paroxysmal atrial fibrillation) ([facility] I48.0    Primary osteoarthritis involving multiple joints M15.0    Varicose veins of both lower extremities I83.93    Impotence N52.9    Polyp of colon K63.5    Osteopenia M85.80    Type 2 diabetes mellitus ([facility] E11.9   

## 2019-10-16 ENCOUNTER — TELEPHONE (OUTPATIENT)
Dept: FAMILY MEDICINE CLINIC | Age: 79
End: 2019-10-16

## 2019-10-16 DIAGNOSIS — G20 PARKINSON'S DISEASE (HCC): Primary | ICD-10-CM

## 2019-10-29 ENCOUNTER — OFFICE VISIT (OUTPATIENT)
Dept: FAMILY MEDICINE CLINIC | Age: 79
End: 2019-10-29
Payer: MEDICARE

## 2019-10-29 VITALS
HEART RATE: 76 BPM | DIASTOLIC BLOOD PRESSURE: 80 MMHG | SYSTOLIC BLOOD PRESSURE: 140 MMHG | BODY MASS INDEX: 29.23 KG/M2 | WEIGHT: 204.2 LBS | HEIGHT: 70 IN

## 2019-10-29 DIAGNOSIS — I10 ESSENTIAL HYPERTENSION: ICD-10-CM

## 2019-10-29 DIAGNOSIS — Z23 NEEDS FLU SHOT: ICD-10-CM

## 2019-10-29 DIAGNOSIS — E11.9 TYPE 2 DIABETES MELLITUS WITHOUT COMPLICATION, WITHOUT LONG-TERM CURRENT USE OF INSULIN (HCC): Primary | ICD-10-CM

## 2019-10-29 DIAGNOSIS — E11.9 TYPE 2 DIABETES MELLITUS WITHOUT COMPLICATION, WITHOUT LONG-TERM CURRENT USE OF INSULIN (HCC): ICD-10-CM

## 2019-10-29 DIAGNOSIS — E03.9 ACQUIRED HYPOTHYROIDISM: ICD-10-CM

## 2019-10-29 DIAGNOSIS — F32.A DEPRESSIVE DISORDER: ICD-10-CM

## 2019-10-29 DIAGNOSIS — J01.90 ACUTE BACTERIAL SINUSITIS: ICD-10-CM

## 2019-10-29 DIAGNOSIS — B96.89 ACUTE BACTERIAL SINUSITIS: ICD-10-CM

## 2019-10-29 DIAGNOSIS — R41.89 COGNITIVE DEFICITS: ICD-10-CM

## 2019-10-29 LAB
ANION GAP SERPL CALCULATED.3IONS-SCNC: 14 MMOL/L (ref 3–16)
BUN BLDV-MCNC: 18 MG/DL (ref 7–20)
CALCIUM SERPL-MCNC: 9.4 MG/DL (ref 8.3–10.6)
CHLORIDE BLD-SCNC: 102 MMOL/L (ref 99–110)
CO2: 23 MMOL/L (ref 21–32)
CREAT SERPL-MCNC: 0.9 MG/DL (ref 0.8–1.3)
CREATININE URINE: 185.3 MG/DL (ref 39–259)
GFR AFRICAN AMERICAN: >60
GFR NON-AFRICAN AMERICAN: >60
GLUCOSE BLD-MCNC: 151 MG/DL (ref 70–99)
MICROALBUMIN UR-MCNC: 1.6 MG/DL
MICROALBUMIN/CREAT UR-RTO: 8.6 MG/G (ref 0–30)
POTASSIUM SERPL-SCNC: 4.2 MMOL/L (ref 3.5–5.1)
SODIUM BLD-SCNC: 139 MMOL/L (ref 136–145)

## 2019-10-29 PROCEDURE — 90653 IIV ADJUVANT VACCINE IM: CPT | Performed by: FAMILY MEDICINE

## 2019-10-29 PROCEDURE — G0008 ADMIN INFLUENZA VIRUS VAC: HCPCS | Performed by: FAMILY MEDICINE

## 2019-10-29 PROCEDURE — 99214 OFFICE O/P EST MOD 30 MIN: CPT | Performed by: FAMILY MEDICINE

## 2019-10-29 RX ORDER — LEVOTHYROXINE SODIUM 0.05 MG/1
50 TABLET ORAL DAILY
Qty: 90 TABLET | Refills: 1 | Status: SHIPPED | OUTPATIENT
Start: 2019-10-29 | End: 2020-05-07 | Stop reason: SDUPTHER

## 2019-10-29 RX ORDER — FLUOXETINE HYDROCHLORIDE 40 MG/1
40 CAPSULE ORAL DAILY
Qty: 90 CAPSULE | Refills: 1 | Status: SHIPPED | OUTPATIENT
Start: 2019-10-29 | End: 2020-07-01

## 2019-10-29 RX ORDER — ATORVASTATIN CALCIUM 20 MG/1
20 TABLET, FILM COATED ORAL DAILY
Qty: 90 TABLET | Refills: 1 | Status: SHIPPED | OUTPATIENT
Start: 2019-10-29 | End: 2020-10-19

## 2019-10-29 RX ORDER — LOSARTAN POTASSIUM 100 MG/1
100 TABLET ORAL DAILY
Qty: 90 TABLET | Refills: 1 | Status: SHIPPED | OUTPATIENT
Start: 2019-10-29 | End: 2020-09-11 | Stop reason: SDUPTHER

## 2019-10-29 RX ORDER — SULFAMETHOXAZOLE AND TRIMETHOPRIM 800; 160 MG/1; MG/1
1 TABLET ORAL 2 TIMES DAILY
Qty: 14 TABLET | Refills: 0 | Status: SHIPPED | OUTPATIENT
Start: 2019-10-29 | End: 2019-11-05

## 2019-10-29 ASSESSMENT — ENCOUNTER SYMPTOMS
CONSTIPATION: 0
CHEST TIGHTNESS: 0
ABDOMINAL PAIN: 0
DIARRHEA: 0
COUGH: 0
SHORTNESS OF BREATH: 0
RHINORRHEA: 0
SORE THROAT: 0
SINUS PRESSURE: 0

## 2019-10-30 LAB
ESTIMATED AVERAGE GLUCOSE: 139.9 MG/DL
HBA1C MFR BLD: 6.5 %

## 2020-01-30 ENCOUNTER — HOSPITAL ENCOUNTER (OUTPATIENT)
Dept: GENERAL RADIOLOGY | Age: 80
Discharge: HOME | End: 2020-01-30
Payer: MEDICARE

## 2020-01-30 ENCOUNTER — HOSPITAL ENCOUNTER (OUTPATIENT)
Age: 80
Discharge: HOME | End: 2020-01-30
Payer: MEDICARE

## 2020-01-30 ENCOUNTER — OFFICE VISIT (OUTPATIENT)
Dept: FAMILY MEDICINE CLINIC | Age: 80
End: 2020-01-30
Payer: MEDICARE

## 2020-01-30 VITALS
WEIGHT: 209.8 LBS | SYSTOLIC BLOOD PRESSURE: 136 MMHG | HEART RATE: 56 BPM | DIASTOLIC BLOOD PRESSURE: 64 MMHG | OXYGEN SATURATION: 98 % | BODY MASS INDEX: 30.1 KG/M2 | TEMPERATURE: 98.1 F

## 2020-01-30 PROCEDURE — 71046 X-RAY EXAM CHEST 2 VIEWS: CPT

## 2020-01-30 PROCEDURE — 99213 OFFICE O/P EST LOW 20 MIN: CPT | Performed by: NURSE PRACTITIONER

## 2020-01-30 NOTE — PATIENT INSTRUCTIONS
Patient Education        Chronic Cough: Care Instructions  Your Care Instructions    A cough is your body's response to something that bothers your throat or airways. Many things can cause a cough. You might cough because of a cold or the flu, bronchitis, or asthma. Smoking, postnasal drip, allergies, and stomach acid that backs up into your throat also can cause a cough. A cough can be short-term (acute) or long-term (chronic). A chronic cough lasts more than 3 weeks. A chronic cough is often caused by a long-term problem, such as asthma. Another cause might be a medicine, such as an ACE inhibitor. A cough is a symptom, not a disease. To treat a chronic cough, you may need to treat the problem that causes it. You can take a few steps at home to cough less and feel better. Some people cough or clear their throat out of habit for no clear reason. Follow-up care is a key part of your treatment and safety. Be sure to make and go to all appointments, and call your doctor if you are having problems. It's also a good idea to know your test results and keep a list of the medicines you take. How can you care for yourself at home? · Drink plenty of water and other fluids. This may help soothe a dry or sore throat. Honey or lemon juice in hot water or tea may ease a dry cough. · Prop up your head on pillows to help you breathe and ease a cough. · Do not smoke or allow others to smoke around you. Smoke can make a cough worse. If you need help quitting, talk to your doctor about stop-smoking programs and medicines. These can increase your chances of quitting for good. · Avoid exposure to smoke, dust, or other pollutants, or wear a face mask. Check with your doctor or pharmacist to find out which type of face mask will give you the most benefit. · Take cough medicine as directed by your doctor. · Try cough drops to soothe a dry or sore throat. Cough drops don't stop a cough.  Medicine-flavored cough drops are no better than candy-flavored drops or hard candy. Throat clearing  When you have a chronic cough or a disease that may cause this type of cough, you may often feel like you want to clear your throat. This helps bring up mucus. But throat clearing does not always have a cause. Throat clearing can become a habit. The more you do it, the more you feel like you need to do it. But frequent throat clearing can be hard on your vocal cords. It's like slamming them together. To help lessen throat clearing, you can try:  · Taking small sips of water. · Not clearing your throat when you feel you need to. · Swallowing hard when you want to clear your throat. You may want to ask your doctor if a medicine that thins mucus would help. When should you call for help? Call 911 anytime you think you may need emergency care. For example, call if:    · You have severe trouble breathing.    Call your doctor now or seek immediate medical care if:    · You cough up blood.     · You have new or worse trouble breathing.     · You have a new or higher fever.    Watch closely for changes in your health, and be sure to contact your doctor if:    · You cough more deeply or more often, especially if you notice more mucus or a change in the color of your mucus.     · You do not get better as expected. Where can you learn more? Go to [email or web address] org and sign in to your [employer] account. Enter I760 in the [employer] box to learn more about \"Chronic Cough: Care Instructions. \"     If you do not have an account, please click on the \"Sign Up Now\" link. Current as of: June 9, 2019  Content Version: 12.3  © 8068-7249 Healthwise, Incorporated. Care instructions adapted under license by [facility] ([facility]). If you have questions about a medical condition or this instruction, always ask your healthcare professional. [address] any warranty or liability for your use of this information.

## 2020-01-30 NOTE — PROGRESS NOTES
Packs/day: 1.00     Years: 13.00     Pack years: 13.00     Start date: 5     Last attempt to quit: 10/29/1967     Years since quittin.3    Smokeless tobacco: Never Used   Substance Use Topics    Alcohol use: Yes     Alcohol/week: 0.0 standard drinks     Comment: RARE, 2 cups coffee daily        Vitals:    20 0940   BP: 136/64   Pulse: 56   Temp: 98.1 °F (36.7 °C)   TempSrc: Oral   SpO2: 98%   Weight: 209 lb 12.8 oz (95.2 kg)     Estimated body mass index is 30.1 kg/m² as calculated from the following:    Height as of 10/29/19: 5' 10\" (1.778 m). Weight as of this encounter: 209 lb 12.8 oz (95.2 kg). Physical Exam  Vitals signs reviewed. Constitutional:       Appearance: Normal appearance. He is well-developed. HENT:      Head: Normocephalic and atraumatic. Right Ear: Hearing, tympanic membrane, ear canal and external ear normal.      Left Ear: Hearing, tympanic membrane, ear canal and external ear normal.      Nose: Nasal deformity (LEFT NARE) and rhinorrhea present. No mucosal edema. Rhinorrhea is clear. Mouth/Throat:      Lips: Pink. Mouth: Mucous membranes are moist.      Pharynx: Oropharynx is clear. Eyes:      Conjunctiva/sclera: Conjunctivae normal.   Cardiovascular:      Rate and Rhythm: Normal rate and regular rhythm. Heart sounds: Normal heart sounds. Pulmonary:      Effort: Pulmonary effort is normal.      Breath sounds: Normal breath sounds. Skin:     General: Skin is warm and dry. Neurological:      Mental Status: He is alert and oriented to person, place, and time. Psychiatric:         Behavior: Behavior normal.         ASSESSMENT/PLAN:  1. Cough present for greater than 3 weeks    - XR CHEST STANDARD (2 VW); Future    2. Nasal deformity    - External Referral To ENT    Return if symptoms worsen or fail to improve. An electronic signature was used to authenticate this note.     --RAMA Patterson - CNP on 2/3/2020 at 3:47 PM

## 2020-02-03 ASSESSMENT — ENCOUNTER SYMPTOMS
EYES NEGATIVE: 1
RHINORRHEA: 1
SINUS PAIN: 0
SORE THROAT: 1
SHORTNESS OF BREATH: 0
COUGH: 1
SINUS PRESSURE: 0
WHEEZING: 0

## 2020-02-24 RX ORDER — AMLODIPINE BESYLATE 5 MG/1
5 TABLET ORAL DAILY
Qty: 30 TABLET | Refills: 5 | Status: SHIPPED | OUTPATIENT
Start: 2020-02-24 | End: 2021-01-04 | Stop reason: SDUPTHER

## 2020-05-07 RX ORDER — LEVOTHYROXINE SODIUM 0.05 MG/1
50 TABLET ORAL DAILY
Qty: 30 TABLET | Refills: 0 | Status: SHIPPED | OUTPATIENT
Start: 2020-05-07 | End: 2020-08-18

## 2020-06-02 RX ORDER — FLUOXETINE HYDROCHLORIDE 40 MG/1
CAPSULE ORAL
Qty: 90 CAPSULE | Refills: 0 | OUTPATIENT
Start: 2020-06-02

## 2020-06-12 RX ORDER — ATORVASTATIN CALCIUM 20 MG/1
TABLET, FILM COATED ORAL
Qty: 90 TABLET | Refills: 0 | OUTPATIENT
Start: 2020-06-12

## 2020-06-12 RX ORDER — LOSARTAN POTASSIUM 100 MG/1
TABLET ORAL
Qty: 90 TABLET | Refills: 0 | OUTPATIENT
Start: 2020-06-12

## 2020-06-12 RX ORDER — FLUOXETINE HYDROCHLORIDE 40 MG/1
CAPSULE ORAL
Qty: 90 CAPSULE | Refills: 0 | OUTPATIENT
Start: 2020-06-12

## 2020-07-01 ENCOUNTER — VIRTUAL VISIT (OUTPATIENT)
Dept: CARDIOLOGY CLINIC | Age: 80
End: 2020-07-01
Payer: MEDICARE

## 2020-07-01 PROCEDURE — 99213 OFFICE O/P EST LOW 20 MIN: CPT | Performed by: INTERNAL MEDICINE

## 2020-07-01 NOTE — PATIENT INSTRUCTIONS
CAD:Yes   clinically stable. Patient is on optimal medical regimen ( see medication list above )  -     CORONARY ARTERY DISEASE: Patient is currently  asymptomatic from CAD. - changes in  treatment:   no           - Testing ordered:  no  [facility] classification: 1  FRAM[place]AM RISK SCORE:  ROSALIE RISK SCORE:  HTN:well controlled on current medical regimen, see list above. - changes in  treatment:   no   CARDIOMYOPATHY: None known   CONGESTIVE HEART FAILURE: NO KNOWN HISTORY.      VHD: No significant VHD noted  DYSLIPIDEMIA: Patient's profile is at / near Goal.yes,                                 HDL is low                                    Tolerating current medical regimen wellyes,                                                           See most recent Lab values in Labs section above. Diabetes mellitis: .yes, Diet controlled. Managed by family MD                                     BS under good control yes,                                      Hgb A1c avilable yes,   OTHER RELEVANT DIAGNOSIS:as noted in patient's active problem list:  TESTS ORDERED: None this visit                                            All previously ordered tests reviewed. ARRHYTHMIAS:  known                                Patient has H/O Atrial fibrillation                                He is rate controlled & on anticoagulation. MEDICATIONS: CPM   Office f/u in six months. Primary/secondary prevention is the goal by aggressive risk modification, healthy and therapeutic life style changes for cardiovascular risk reduction. Various goals are discussed and multiple questions answered.

## 2020-07-01 NOTE — PROGRESS NOTES
OFFICE PROGRESS NOTES      Jonathan Walden is a 78 y.o. male who has    CHIEF COMPLAINT AS FOLLOWS:  CHEST PAIN: Patient denies any C/O chest pains at this time. SOB: No C/O SOB at this time. LEG EDEMA:  B/L Lower extremity edema is present but no change over previous. PALPITATIONS: No C/O  of palpitations. DIZZINESS: No C/O  Dizziness. SYNCOPE: None   OTHER:                                     HPI: Patient is here for F/U on his PAF, VHD CAD, HTN & Dyslipidemia problems. He does not have any new complaints at this time.     Nati Delgado has the following history recorded in care path:  Patient Active Problem List    Diagnosis Date Noted    PAF (paroxysmal atrial fibrillation) ([facility]      Priority: Low    H/O exercise stress test 05/14/2013     Priority: Low    H/O echocardiogram 03/11/2013     Priority: Low    CAD (coronary artery disease) 11/15/2012     Priority: Low    Pure hypercholesterolemia      Priority: Low    Cardiomyopathy ([facility]      Priority: Low    VHD (valvular heart disease)      Priority: Low    Primary osteoarthritis involving multiple joints     Varicose veins of both lower extremities     Impotence     Polyp of colon     Osteopenia     Type 2 diabetes mellitus (HCC)     Atrial fibrillation (HCC)     Essential hypertension     Acquired hypothyroidism     Depressive disorder      Current Outpatient Medications   Medication Sig Dispense Refill    levothyroxine (SYNTHROID) 50 MCG tablet Take 1 tablet by mouth Daily 30 tablet 0    amLODIPine (NORVASC) 5 MG tablet Take 1 tablet by mouth daily 30 tablet 5    losartan (COZAAR) 100 MG tablet Take 1 tablet by mouth daily 90 tablet 1    atorvastatin (LIPITOR) 20 MG tablet Take 1 tablet by mouth daily 90 tablet 1    finasteride (PROSCAR) 5 MG tablet Take 5 mg by mouth daily      memantine (NAMENDA) 5 MG tablet       rivaroxaban (XARELTO) 20 MG TABS tablet Take 1 tablet by mouth every 24 hours 90 tablet 3    alfuzosin (UROXATRAL) 10 MG SR tablet Take 10 mg by mouth daily      aspirin 81 MG tablet Take 81 mg by mouth daily. No current facility-administered medications for this visit. Allergies: Lisinopril and Pcn [penicillins]  Past Medical History:   Diagnosis Date    Atrial fibrillation ([facility]     on xarelto    CAD (coronary artery disease)     Cardiomyopathy ([facility]     Depressive disorder     major    Essential hypertension     Fall 10/2013    Was walking and missed his step and fell.  H/O cardiovascular stress test 07/08/2016    EF 53% Normal study    H/O echocardiogram 03/11/2013    EF =>55%, abnormal LV diastolic function Stage 1, mild mitral and triuspid regurg,normal LV systolic function, no pericarial effusion.  H/O exercise stress test 05/14/2013    EXERCISE STRESS-negative stress test    History of echocardiogram 08/20/2018    Left ventricular systolic function is normal with an ejection fraction of 55-60%. Mild concentric left ventricular hypertrophy. The left atrium is mildly dilated. No significant valvular disease or diastolic dysfunction noted. No evidence of pericardial effusion.     History of Holter monitoring 7/14/14    1 episode of a-fib x 11 hours and 58 minutes    Impotence     Osteopenia     PAF (paroxysmal atrial fibrillation) (HCC)     Polyp of colon     S/P PTCA (percutaneous transluminal coronary angioplasty) 11/15/2012    stenting of 2 sites in RCA    Type 2 diabetes mellitus ([facility]     Varicose veins of both lower extremities     VHD (valvular heart disease)      Past Surgical History:   Procedure Laterality Date    ANUS SURGERY      anal fistula-70's    CHOLECYSTECTOMY  2009    PTCA  11/2012    TONSILLECTOMY AND ADENOIDECTOMY        As reviewed   Family History   Problem Relation Age of Onset    Tuberculosis Mother     Heart Surgery Father     Pacemaker Father     Depression Daughter      Social History     Tobacco Use    Smoking status: Former Smoker     Packs/day: 1.00     Years: 13.00     Pack years: 13.00     Start date:      Last attempt to quit: 10/29/1967     Years since quittin.7    Smokeless tobacco: Never Used   Substance Use Topics    Alcohol use: Yes     Alcohol/week: 0.0 standard drinks     Comment: RARE, 2 cups coffee daily      Review of Systems:    Constitutional: Negative for diaphoresis and fatigue  Psychological:Negative for anxiety or depression  HENT: Negative for headaches, nasal congestion, sinus pain or vertigo  Eyes: Negative for visual disturbance. Endocrine: Negative for polydipsia/polyuria  Respiratory: Negative for shortness of breath  Cardiovascular: Negative for chest pain, dyspnea on exertion, claudication, edema, irregular heartbeat, murmur, palpitations or shortness of breath  Gastrointestinal: Negative for abdominal pain or heartburn  Genito-Urinary: Negative for urinary frequency/urgency  Musculoskeletal: Negative for muscle pain, muscular weakness, negative for pain in arm and leg or swelling in foot and leg  Neurological: Negative for dizziness, headaches, memory loss, numbness/tingling, visual changes, syncope  Dermatological: Negative for rash    Objective: Wt Readings from Last 3 Encounters:   20 209 lb 12.8 oz (95.2 kg)   10/29/19 204 lb 3.2 oz (92.6 kg)   19 204 lb (92.5 kg)       Patient-Reported Vitals 2020   Patient-Reported Weight 200lbs   Patient-Reported Height 5' 11\"   Patient-Reported Systolic 600   Patient-Reported Diastolic 82   Patient-Reported Pulse 57          GENERAL - Alert, oriented, pleasant, in no apparent distress.     Lab Review   Lab Results   Component Value Date    TROPONINT <0.010 01/10/2016    TROPONINT <0.010 2014     BNP:  No results found for: BNP  PT/INR:    Lab Results   Component Value Date    INR 1.13 2012     Lab Results   Component Value Date    LABA1C 6.5 10/29/2019    LABA1C 6.5 06/24/2019     Lab Results   Component Value Date    WBC 4.8 06/24/2019    WBC 3.6 06/25/2018    HCT 40.5 06/24/2019    HCT 38.1 (A) 06/25/2018    MCV 92.8 06/24/2019    MCV 90.4 01/10/2016     06/24/2019     06/25/2018     Lab Results   Component Value Date    CHOL 119 06/24/2019    CHOL 134 10/16/2017    TRIG 152 (H) 06/24/2019    TRIG 168 10/16/2017    HDL 31 (L) 06/24/2019    HDL 27 (A) 10/16/2017    LDLCALC 58 06/24/2019    LDLCALC 73 10/16/2017    LDLDIRECT 75 05/13/2013    LDLDIRECT 75 11/15/2012     Lab Results   Component Value Date    ALT 13 06/24/2019    ALT 15 10/16/2017    AST 15 06/24/2019    AST 15 10/16/2017     BMP:    Lab Results   Component Value Date     10/29/2019     06/24/2019    K 4.2 10/29/2019    K 4.2 06/24/2019     10/29/2019     06/24/2019    CO2 23 10/29/2019    CO2 23 06/24/2019    BUN 18 10/29/2019    BUN 18 06/24/2019    CREATININE 0.9 10/29/2019    CREATININE 0.9 06/24/2019     CMP:   Lab Results   Component Value Date     10/29/2019     06/24/2019    K 4.2 10/29/2019    K 4.2 06/24/2019     10/29/2019     06/24/2019    CO2 23 10/29/2019    CO2 23 06/24/2019    BUN 18 10/29/2019    BUN 18 06/24/2019    PROT 6.9 06/24/2019    PROT 7.0 07/09/2014    PROT 6.9 12/09/2013     TSH:    Lab Results   Component Value Date    TSH 3.70 06/24/2019    TSH 2.650 01/10/2019    TSHHS 6.080 01/10/2016       QUALITY MEASURES REVIEWED:  1.CAD:Patient is taking anti platelet agent:Yes  2. DYSLIPIDEMIA: Patient is on cholesterol lowering medication:Yes  3. Beta-Blocker therapy for CAD, if prior Myocardial Infarction:No  4. Atrial fibrillation & anticoagulation therapy Yes  5. Discussed weight management strategies. Impression:    1. Coronary artery disease involving native coronary artery of native heart without angina pectoris    2. Pure hypercholesterolemia    3. Ischemic cardiomyopathy    4. VHD (valvular heart disease)    5.  PAF (paroxysmal atrial fibrillation) (HCC)    6. Varicose veins of both lower extremities, unspecified whether complicated    7. Type 2 diabetes mellitus without complication, without long-term current use of insulin ([facility]    8. Paroxysmal atrial fibrillation (HCC)    9. Essential hypertension       Patient Active Problem List   Diagnosis Code    Pure hypercholesterolemia E78.00    Cardiomyopathy ([facility] I42.9    VHD (valvular heart disease) I38    CAD (coronary artery disease) I25.10    H/O echocardiogram Z92.89    H/O exercise stress test Z92.89    PAF (paroxysmal atrial fibrillation) ([facility]) I48.0    Primary osteoarthritis involving multiple joints M15.0    Varicose veins of both lower extremities I83.93    Impotence N52.9    Polyp of colon K63.5    Osteopenia M85.80    Type 2 diabetes mellitus (HCC) E11.9    Atrial fibrillation (HCC) I48.91    Essential hypertension I10    Acquired hypothyroidism E03.9    Depressive disorder F32.9       Assessment & Plan:    being evaluated by a Telephone visit encounter to address concerns as mentioned above. A caregiver was present when appropriate. Due to this being a TeleHealth encounter (During [facility] public health emergency), evaluation of the following organ systems was limited: Vitals/Constitutional/EENT/Resp/CV/GI//MS/Neuro/Skin/Heme-Lymph-Imm. Pursuant to the emergency declaration under the [facility], [address] [place] [address] authority and the [employer] Act, this Virtual Visit was conducted with patient's (and/or legal guardian's) consent, to reduce the patient's risk of exposure to COVID-19 and provide necessary medical care. The patient (and/or legal guardian) has also been advised to contact this office for worsening conditions or problems, and seek emergency medical treatment and/or call 911 if deemed necessary. Time spent during this visit 12 min.     CAD:Yes clinically stable. Patient is on optimal medical regimen ( see medication list above )  -     CORONARY ARTERY DISEASE: Patient is currently  asymptomatic from CAD. - changes in  treatment:   no           - Testing ordered:  no  [facility] classification: 1  [place] RISK SCORE:  ROSALIE RISK SCORE:  HTN:well controlled on current medical regimen, see list above. - changes in  treatment:   no   CARDIOMYOPATHY: None known   CONGESTIVE HEART FAILURE: NO KNOWN HISTORY.      VHD: No significant VHD noted  DYSLIPIDEMIA: Patient's profile is at / near Goal.yes,                                 HDL is low                                    Tolerating current medical regimen wellyes,                                                           See most recent Lab values in Labs section above. Diabetes mellitis: .yes, Diet controlled. Managed by family MD                                     BS under good control yes,                                      Hgb A1c avilable yes,   OTHER RELEVANT DIAGNOSIS:as noted in patient's active problem list:  TESTS ORDERED: None this visit                                            All previously ordered tests reviewed. ARRHYTHMIAS:  known                                Patient has H/O Atrial fibrillation                                He is rate controlled & on anticoagulation. MEDICATIONS: CPM   Office f/u in six months. Primary/secondary prevention is the goal by aggressive risk modification, healthy and therapeutic life style changes for cardiovascular risk reduction. Various goals are discussed and multiple questions answered.

## 2020-07-01 NOTE — LETTER
[address] [facility]  [address] [address]  Phone: [phone]  Fax: [phone]    Sadie Rosen MD        July 1, 2020     [address]    Patient: Jenaro Moody  MR Number: [id number]  YOB: 1940  Date of Visit: 7/1/2020    Dear Dr. Debi Irizarry: Thank you for the request for consultation for Yazmin Murrell to me for the evaluation of CAD. Below are the relevant portions of my assessment and plan of care. If you have questions, please do not hesitate to call me. I look forward to following Alvarado Biswas along with you.     Sincerely,        Sadie Rosen MD

## 2020-08-17 NOTE — TELEPHONE ENCOUNTER
Next none  Last 10/29/2019  Pharm Antony Catalan    Pt stated he is on an antibiotic cream as well-- could not see in chart

## 2020-08-18 RX ORDER — LEVOTHYROXINE SODIUM 0.05 MG/1
50 TABLET ORAL DAILY
Qty: 7 TABLET | Refills: 0 | Status: SHIPPED | OUTPATIENT
Start: 2020-08-18 | End: 2020-08-24 | Stop reason: SDUPTHER

## 2020-08-18 NOTE — TELEPHONE ENCOUNTER
Requested Prescriptions     Signed Prescriptions Disp Refills    levothyroxine (SYNTHROID) 50 MCG tablet 7 tablet 0     Sig: Take 1 tablet by mouth Daily for 7 days NEED AN APPOINTMENT     Authorizing Provider: Delgado Arana     Ordering User: Radha Andrew

## 2020-08-24 ENCOUNTER — OFFICE VISIT (OUTPATIENT)
Dept: FAMILY MEDICINE CLINIC | Age: 80
End: 2020-08-24
Payer: MEDICARE

## 2020-08-24 VITALS
OXYGEN SATURATION: 97 % | BODY MASS INDEX: 28.48 KG/M2 | WEIGHT: 203.4 LBS | HEART RATE: 56 BPM | TEMPERATURE: 97.9 F | DIASTOLIC BLOOD PRESSURE: 72 MMHG | SYSTOLIC BLOOD PRESSURE: 130 MMHG | HEIGHT: 71 IN

## 2020-08-24 PROCEDURE — 99215 OFFICE O/P EST HI 40 MIN: CPT | Performed by: PHYSICIAN ASSISTANT

## 2020-08-24 PROCEDURE — 3288F FALL RISK ASSESSMENT DOCD: CPT | Performed by: PHYSICIAN ASSISTANT

## 2020-08-24 RX ORDER — LEVOTHYROXINE SODIUM 0.05 MG/1
50 TABLET ORAL DAILY
Qty: 90 TABLET | Refills: 1 | Status: SHIPPED
Start: 2020-08-24 | End: 2020-08-28 | Stop reason: DRUGHIGH

## 2020-08-24 RX ORDER — TRIAMCINOLONE ACETONIDE 5 MG/G
CREAM TOPICAL
Qty: 15 G | Refills: 5 | Status: SHIPPED | OUTPATIENT
Start: 2020-08-24 | End: 2020-08-31

## 2020-08-24 ASSESSMENT — ENCOUNTER SYMPTOMS: SHORTNESS OF BREATH: 0

## 2020-08-24 NOTE — PROGRESS NOTES
8/24/2020    Brii Signs    Chief Complaint   Patient presents with    Other     pt states that he has a rash around both ankles that come and go . Pt stataes that this flare up started 1 month ago . Pt says it  itches  sometimes . HPI  History was obtained from the patient. Mattie Lockwood is a 78 y.o. male who presents today for routine visit. Patient has not been seen since October last year. He denies running out of any of his medications. He has history of type 2 diabetes. Blood sugar in the AM is usually 140. His last A1c was with really good control and patient controls with diet alone, is not on any medications for the diabetes. Has history of hypertension which has been well controlled and patient is compliant with his medications which he believes last came from cardiology. He does seem confused on who prescribes what, he says sometimes we prescribe and sometimes cardiology prescribes it. I did explain to the patient that it is better for prescription to always come from the same provider to reduce confusion. He voiced understanding. He has history of hypothyroidism and is due for blood work to reevaluate this. Has history of cardiomyopathy which is managed by cardiology along with his coronary artery disease and atrial fibrillation. He has history of eczema which is well controlled with triamcinolone cream to use as needed. He does note a current breakout of eczema around his ankles that started a month ago but patient did not had have any more cream to treat this. REVIEW OF SYMPTOMS    Review of Systems   Constitutional: Negative for activity change, chills and fever. Respiratory: Negative for shortness of breath. Cardiovascular: Negative for chest pain and palpitations. Neurological: Negative for headaches. Psychiatric/Behavioral: Negative for dysphoric mood. The patient is not nervous/anxious.         SOCIAL HISTORY  Social History     Socioeconomic History    Marital status:      Spouse name: None    Number of children: None    Years of education: None    Highest education level: None   Occupational History    None   Social Needs    Financial resource strain: None    Food insecurity     Worry: None     Inability: None    Transportation needs     Medical: None     Non-medical: None   Tobacco Use    Smoking status: Former Smoker     Packs/day: 1.00     Years: 13.00     Pack years: 13.00     Start date:      Last attempt to quit: 10/29/1967     Years since quittin.8    Smokeless tobacco: Never Used   Substance and Sexual Activity    Alcohol use: Yes     Alcohol/week: 0.0 standard drinks     Comment: RARE, 2 cups coffee daily    Drug use: No    Sexual activity: Yes     Partners: Female     Comment:    Lifestyle    Physical activity     Days per week: None     Minutes per session: None    Stress: None   Relationships    Social connections     Talks on phone: None     Gets together: None     Attends [employer] service: None     Active member of club or organization: None     Attends meetings of clubs or organizations: None     Relationship status: None    Intimate partner violence     Fear of current or ex partner: None     Emotionally abused: None     Physically abused: None     Forced sexual activity: None   Other Topics Concern    None   Social History Narrative    None        CURRENT MEDICATIONS  Current Outpatient Medications   Medication Sig Dispense Refill    levothyroxine (SYNTHROID) 50 MCG tablet Take 1 tablet by mouth Daily 90 tablet 1    triamcinolone (ARISTOCORT) 0.5 % cream Apply topically 3 times daily.  15 g 5    amLODIPine (NORVASC) 5 MG tablet Take 1 tablet by mouth daily 30 tablet 5    losartan (COZAAR) 100 MG tablet Take 1 tablet by mouth daily 90 tablet 1    atorvastatin (LIPITOR) 20 MG tablet Take 1 tablet by mouth daily 90 tablet 1    finasteride (PROSCAR) 5 MG tablet Take 5 mg by mouth daily      memantine (NAMENDA) 5 MG tablet       rivaroxaban (XARELTO) 20 MG TABS tablet Take 1 tablet by mouth every 24 hours 90 tablet 3    alfuzosin (UROXATRAL) 10 MG SR tablet Take 10 mg by mouth daily      aspirin 81 MG tablet Take 81 mg by mouth daily. No current facility-administered medications for this visit. PHYSICAL EXAM    /72   Pulse 56   Temp 97.9 °F (36.6 °C)   Ht 5' 11\" (1.803 m)   Wt 203 lb 6.4 oz (92.3 kg)   SpO2 97%   BMI 28.37 kg/m²     Physical Exam  Vitals signs and nursing note reviewed. Constitutional:       Appearance: Normal appearance. HENT:      Head: Normocephalic and atraumatic. Eyes:      Extraocular Movements: Extraocular movements intact. Conjunctiva/sclera: Conjunctivae normal.   Cardiovascular:      Rate and Rhythm: Normal rate and regular rhythm. Pulses: Normal pulses. Heart sounds: Normal heart sounds. Pulmonary:      Effort: Pulmonary effort is normal.   Neurological:      General: No focal deficit present. Mental Status: He is alert and oriented to person, place, and time. Psychiatric:         Mood and Affect: Mood normal.         ASSESSMENT & PLAN    1. Type 2 diabetes mellitus without complication, without long-term current use of insulin ([facility])  Reevaluate diabetes with A1c. Has been well controlled in the past without needing medicinal therapy. - Hemoglobin A1C; Future    2. Essential hypertension  Currently well controlled, continue to follow with cardiology who has been prescribing his blood pressure medication per patient report  - Comprehensive Metabolic Panel; Future    3. Acquired hypothyroidism  Reevaluate when patient returns fasting for blood work. - levothyroxine (SYNTHROID) 50 MCG tablet; Take 1 tablet by mouth Daily  Dispense: 90 tablet; Refill: 1  - TSH without Reflex; Future    4. Cardiomyopathy, unspecified type ([facility] [address]  Continue to follow with cardiology    5.  Coronary artery disease involving native coronary artery of native heart with angina pectoris ([facility] [address]  Continue to follow with cardiology and return fasting for routine blood work  - Comprehensive Metabolic Panel; Future  - CBC Auto Differential; Future  - Lipid Panel; Future    6. Paroxysmal atrial fibrillation (H[facility])  Continue to follow with cardiology    7. Eczema, unspecified type  Refill triamcinolone sent in for patient to use as needed  - triamcinolone (ARISTOCORT) 0.5 % cream; Apply topically 3 times daily. Dispense: 15 g; Refill: 5           Electronically signed by Gerlean Hodgkin, PA-C on 8/24/2020    Please note that this chart was generated using dragon dictation software. Although every effort was made to ensure the accuracy of this automated transcription, some errors in transcription may have occurred.

## 2020-08-25 ENCOUNTER — HOSPITAL ENCOUNTER (OUTPATIENT)
Age: 80
Discharge: HOME | End: 2020-08-25
Payer: MEDICARE

## 2020-08-25 LAB
ALBUMIN SERPL-MCNC: 4.2 GM/DL (ref 3.4–5)
ALP BLD-CCNC: 48 IU/L (ref 40–128)
ALT SERPL-CCNC: 13 U/L (ref 10–40)
ANION GAP SERPL CALCULATED.3IONS-SCNC: 10 MMOL/L (ref 4–16)
AST SERPL-CCNC: 14 IU/L (ref 15–37)
BASOPHILS ABSOLUTE: 0.1 K/CU MM
BASOPHILS RELATIVE PERCENT: 1.8 % (ref 0–1)
BILIRUB SERPL-MCNC: 0.4 MG/DL (ref 0–1)
BUN BLDV-MCNC: 21 MG/DL (ref 6–23)
CALCIUM SERPL-MCNC: 9.2 MG/DL (ref 8.3–10.6)
CHLORIDE BLD-SCNC: 105 MMOL/L (ref 99–110)
CHOLESTEROL: 117 MG/DL
CO2: 23 MMOL/L (ref 21–32)
CREAT SERPL-MCNC: 1 MG/DL (ref 0.9–1.3)
DIFFERENTIAL TYPE: ABNORMAL
EOSINOPHILS ABSOLUTE: 0.2 K/CU MM
EOSINOPHILS RELATIVE PERCENT: 3.8 % (ref 0–3)
ESTIMATED AVERAGE GLUCOSE: 154 MG/DL
GFR AFRICAN AMERICAN: >60 ML/MIN/1.73M2
GFR NON-AFRICAN AMERICAN: >60 ML/MIN/1.73M2
GLUCOSE BLD-MCNC: 122 MG/DL (ref 70–99)
HBA1C MFR BLD: 7 % (ref 4.2–6.3)
HCT VFR BLD CALC: 41 % (ref 42–52)
HDLC SERPL-MCNC: 24 MG/DL
HEMOGLOBIN: 12.7 GM/DL (ref 13.5–18)
IMMATURE NEUTROPHIL %: 0.4 % (ref 0–0.43)
LDL CHOLESTEROL DIRECT: 53 MG/DL
LYMPHOCYTES ABSOLUTE: 1.1 K/CU MM
LYMPHOCYTES RELATIVE PERCENT: 20.2 % (ref 24–44)
MCH RBC QN AUTO: 29.4 PG (ref 27–31)
MCHC RBC AUTO-ENTMCNC: 31 % (ref 32–36)
MCV RBC AUTO: 94.9 FL (ref 78–100)
MONOCYTES ABSOLUTE: 0.4 K/CU MM
MONOCYTES RELATIVE PERCENT: 7.3 % (ref 0–4)
NUCLEATED RBC %: 0 %
PDW BLD-RTO: 13.3 % (ref 11.7–14.9)
PLATELET # BLD: 180 K/CU MM (ref 140–440)
PMV BLD AUTO: 10.8 FL (ref 7.5–11.1)
POTASSIUM SERPL-SCNC: 4.2 MMOL/L (ref 3.5–5.1)
RBC # BLD: 4.32 M/CU MM (ref 4.6–6.2)
SEGMENTED NEUTROPHILS ABSOLUTE COUNT: 3.7 K/CU MM
SEGMENTED NEUTROPHILS RELATIVE PERCENT: 66.5 % (ref 36–66)
SODIUM BLD-SCNC: 138 MMOL/L (ref 135–145)
TOTAL IMMATURE NEUTOROPHIL: 0.02 K/CU MM
TOTAL NUCLEATED RBC: 0 K/CU MM
TOTAL PROTEIN: 6.5 GM/DL (ref 6.4–8.2)
TRIGL SERPL-MCNC: 239 MG/DL
TSH HIGH SENSITIVITY: 12.77 UIU/ML (ref 0.27–4.2)
WBC # BLD: 5.5 K/CU MM (ref 4–10.5)

## 2020-08-25 PROCEDURE — 85025 COMPLETE CBC W/AUTO DIFF WBC: CPT

## 2020-08-25 PROCEDURE — 84466 ASSAY OF TRANSFERRIN: CPT

## 2020-08-25 PROCEDURE — 82728 ASSAY OF FERRITIN: CPT

## 2020-08-25 PROCEDURE — 80061 LIPID PANEL: CPT

## 2020-08-25 PROCEDURE — 84443 ASSAY THYROID STIM HORMONE: CPT

## 2020-08-25 PROCEDURE — 83721 ASSAY OF BLOOD LIPOPROTEIN: CPT

## 2020-08-25 PROCEDURE — 83036 HEMOGLOBIN GLYCOSYLATED A1C: CPT

## 2020-08-25 PROCEDURE — 80053 COMPREHEN METABOLIC PANEL: CPT

## 2020-08-25 PROCEDURE — 36415 COLL VENOUS BLD VENIPUNCTURE: CPT

## 2020-08-25 PROCEDURE — 83540 ASSAY OF IRON: CPT

## 2020-08-26 LAB
FERRITIN: 52 NG/ML (ref 30–400)
IRON: 65 UG/DL (ref 59–158)
PCT TRANSFERRIN: 21 % (ref 10–44)
TOTAL IRON BINDING CAPACITY: 312 UG/DL (ref 250–450)
TRANSFERRIN: 245.6 MG/DL (ref 200–360)
UNSATURATED IRON BINDING CAPACITY: 247 UG/DL (ref 110–370)

## 2020-08-27 ENCOUNTER — TELEPHONE (OUTPATIENT)
Dept: FAMILY MEDICINE CLINIC | Age: 80
End: 2020-08-27

## 2020-08-28 RX ORDER — LEVOTHYROXINE SODIUM 0.07 MG/1
75 TABLET ORAL DAILY
Qty: 30 TABLET | Refills: 5 | Status: SHIPPED | OUTPATIENT
Start: 2020-08-28 | End: 2021-05-14

## 2020-09-11 RX ORDER — LOSARTAN POTASSIUM 100 MG/1
100 TABLET ORAL DAILY
Qty: 90 TABLET | Refills: 1 | Status: SHIPPED | OUTPATIENT
Start: 2020-09-11 | End: 2021-03-15

## 2020-09-11 NOTE — TELEPHONE ENCOUNTER
Requested Prescriptions     Signed Prescriptions Disp Refills    losartan (COZAAR) 100 MG tablet 90 tablet 1     Sig: Take 1 tablet by mouth daily     Authorizing Provider: Gwendolyn Luna     Ordering User: Chad Hyde

## 2020-10-10 ENCOUNTER — APPOINTMENT (OUTPATIENT)
Dept: GENERAL RADIOLOGY | Age: 80
End: 2020-10-10
Payer: MEDICARE

## 2020-10-10 ENCOUNTER — HOSPITAL ENCOUNTER (EMERGENCY)
Age: 80
Discharge: HOME | End: 2020-10-10
Attending: EMERGENCY MEDICINE
Payer: MEDICARE

## 2020-10-10 VITALS
BODY MASS INDEX: 26.6 KG/M2 | SYSTOLIC BLOOD PRESSURE: 100 MMHG | RESPIRATION RATE: 12 BRPM | HEART RATE: 48 BPM | WEIGHT: 190 LBS | OXYGEN SATURATION: 98 % | TEMPERATURE: 97.9 F | HEIGHT: 71 IN | DIASTOLIC BLOOD PRESSURE: 74 MMHG

## 2020-10-10 LAB
ALBUMIN SERPL-MCNC: 4.2 GM/DL (ref 3.4–5)
ALP BLD-CCNC: 53 IU/L (ref 40–129)
ALT SERPL-CCNC: 15 U/L (ref 10–40)
ANION GAP SERPL CALCULATED.3IONS-SCNC: 12 MMOL/L (ref 4–16)
AST SERPL-CCNC: 15 IU/L (ref 15–37)
BASOPHILS ABSOLUTE: 0.1 K/CU MM
BASOPHILS RELATIVE PERCENT: 1 % (ref 0–1)
BILIRUB SERPL-MCNC: 0.6 MG/DL (ref 0–1)
BUN BLDV-MCNC: 17 MG/DL (ref 6–23)
CALCIUM SERPL-MCNC: 9.2 MG/DL (ref 8.3–10.6)
CHLORIDE BLD-SCNC: 102 MMOL/L (ref 99–110)
CO2: 23 MMOL/L (ref 21–32)
CREAT SERPL-MCNC: 0.9 MG/DL (ref 0.9–1.3)
DIFFERENTIAL TYPE: ABNORMAL
EKG ATRIAL RATE: 131 BPM
EKG DIAGNOSIS: NORMAL
EKG Q-T INTERVAL: 340 MS
EKG QRS DURATION: 104 MS
EKG QTC CALCULATION (BAZETT): 484 MS
EKG R AXIS: -57 DEGREES
EKG T AXIS: 61 DEGREES
EKG VENTRICULAR RATE: 122 BPM
EOSINOPHILS ABSOLUTE: 0.2 K/CU MM
EOSINOPHILS RELATIVE PERCENT: 3 % (ref 0–3)
GFR AFRICAN AMERICAN: >60 ML/MIN/1.73M2
GFR NON-AFRICAN AMERICAN: >60 ML/MIN/1.73M2
GLUCOSE BLD-MCNC: 152 MG/DL (ref 70–99)
HCT VFR BLD CALC: 44.2 % (ref 42–52)
HEMOGLOBIN: 14.3 GM/DL (ref 13.5–18)
IMMATURE NEUTROPHIL %: 0.2 % (ref 0–0.43)
LYMPHOCYTES ABSOLUTE: 0.7 K/CU MM
LYMPHOCYTES RELATIVE PERCENT: 14.2 % (ref 24–44)
MCH RBC QN AUTO: 30.1 PG (ref 27–31)
MCHC RBC AUTO-ENTMCNC: 32.4 % (ref 32–36)
MCV RBC AUTO: 93.1 FL (ref 78–100)
MONOCYTES ABSOLUTE: 0.4 K/CU MM
MONOCYTES RELATIVE PERCENT: 7.3 % (ref 0–4)
NUCLEATED RBC %: 0 %
PDW BLD-RTO: 13 % (ref 11.7–14.9)
PLATELET # BLD: 163 K/CU MM (ref 140–440)
PMV BLD AUTO: 10.2 FL (ref 7.5–11.1)
POTASSIUM SERPL-SCNC: 4.2 MMOL/L (ref 3.5–5.1)
PRO-BNP: 393 PG/ML
RBC # BLD: 4.75 M/CU MM (ref 4.6–6.2)
SEGMENTED NEUTROPHILS ABSOLUTE COUNT: 3.7 K/CU MM
SEGMENTED NEUTROPHILS RELATIVE PERCENT: 74.3 % (ref 36–66)
SODIUM BLD-SCNC: 137 MMOL/L (ref 135–145)
TOTAL IMMATURE NEUTOROPHIL: 0.01 K/CU MM
TOTAL NUCLEATED RBC: 0 K/CU MM
TOTAL PROTEIN: 6.8 GM/DL (ref 6.4–8.2)
TROPONIN T: <0.01 NG/ML
TSH HIGH SENSITIVITY: 3.18 UIU/ML (ref 0.27–4.2)
WBC # BLD: 4.9 K/CU MM (ref 4–10.5)

## 2020-10-10 PROCEDURE — 80053 COMPREHEN METABOLIC PANEL: CPT

## 2020-10-10 PROCEDURE — 84484 ASSAY OF TROPONIN QUANT: CPT

## 2020-10-10 PROCEDURE — 99285 EMERGENCY DEPT VISIT HI MDM: CPT

## 2020-10-10 PROCEDURE — 71045 X-RAY EXAM CHEST 1 VIEW: CPT

## 2020-10-10 PROCEDURE — 94761 N-INVAS EAR/PLS OXIMETRY MLT: CPT

## 2020-10-10 PROCEDURE — 96376 TX/PRO/DX INJ SAME DRUG ADON: CPT

## 2020-10-10 PROCEDURE — 93005 ELECTROCARDIOGRAM TRACING: CPT | Performed by: EMERGENCY MEDICINE

## 2020-10-10 PROCEDURE — 2500000003 HC RX 250 WO HCPCS: Performed by: PHYSICIAN ASSISTANT

## 2020-10-10 PROCEDURE — 85025 COMPLETE CBC W/AUTO DIFF WBC: CPT

## 2020-10-10 PROCEDURE — 96374 THER/PROPH/DIAG INJ IV PUSH: CPT

## 2020-10-10 PROCEDURE — 93005 ELECTROCARDIOGRAM TRACING: CPT | Performed by: PHYSICIAN ASSISTANT

## 2020-10-10 PROCEDURE — 84443 ASSAY THYROID STIM HORMONE: CPT

## 2020-10-10 PROCEDURE — 83880 ASSAY OF NATRIURETIC PEPTIDE: CPT

## 2020-10-10 PROCEDURE — 93010 ELECTROCARDIOGRAM REPORT: CPT | Performed by: INTERNAL MEDICINE

## 2020-10-10 RX ORDER — DILTIAZEM HYDROCHLORIDE 5 MG/ML
10 INJECTION INTRAVENOUS ONCE
Status: COMPLETED | OUTPATIENT
Start: 2020-10-10 | End: 2020-10-10

## 2020-10-10 RX ORDER — BLOOD PRESSURE TEST KIT
1 KIT MISCELLANEOUS DAILY PRN
Qty: 1 KIT | Refills: 0 | Status: SHIPPED | OUTPATIENT
Start: 2020-10-10 | End: 2021-08-25

## 2020-10-10 RX ORDER — DILTIAZEM HYDROCHLORIDE 120 MG/1
120 CAPSULE, EXTENDED RELEASE ORAL EVERY 12 HOURS PRN
Qty: 10 CAPSULE | Refills: 3 | Status: SHIPPED | OUTPATIENT
Start: 2020-10-10

## 2020-10-10 RX ADMIN — DILTIAZEM HYDROCHLORIDE 10 MG: 5 INJECTION INTRAVENOUS at 10:07

## 2020-10-10 RX ADMIN — DEXTROSE MONOHYDRATE 5 MG/HR: 50 INJECTION, SOLUTION INTRAVENOUS at 10:16

## 2020-10-10 NOTE — ED PROVIDER NOTES
I independently examined and evaluated Devon Trujillo. In brief, 69-year-old male with history of cardiomyopathy, valvular heart disease, CAD, paroxysmal atrial fibrillation, diabetes, presents with complaint of palpitations. . Has h/o paroxysmal afib, on xarelto. In middle of night started feeling palpitations/heart racing, has not improved. No syncope. No chest pain or shortness of breath. He follows with Dr. Morgan Martel. Reviewed imaging and most recent echocardiogram in 2018 he had an ejection fraction of 55 to 60%    Focused exam revealed patient no acute distress, right irregular rate and rhythm, rate 100s to 120s. Nonlabored respirations. And soft and nondistended. No peripheral edema. Alert and oriented. .    ED course: Plan for labs, chest x-ray, EKG as below, Cardizem, discussion with cardiology and likely admission. EKG:  Atrial fibrillation with rapid ventricular response, rate of 122 bpm.  Nonspecific ST and T wave changes likely rate related. No previous to compare. All diagnostic, treatment, and disposition decisions were made by myself in conjunction with the advanced practice provider. For all further details of the patient's emergency department visit, please see the advanced practice provider's documentation. Comment: Please note this report has been produced using speech recognition software and may contain errors related to that system including errors in grammar, punctuation, and spelling, as well as words and phrases that may be inappropriate. If there are any questions or concerns please feel free to contact the dictating provider for clarification. Jake Montesinos MD  10/10/20 1002      Repeat EKG after he had converted is sinus bradycardia with rate of 52 bpm, normal intervals, left axis deviation.      Jake Montesinos MD  10/10/20 [id number]

## 2020-10-10 NOTE — ED NOTES
Reviewed discharge instructions with pt. Answered all questions. Pt verbalized understanding.       Jaylen Mayberry RN  10/10/20 9420

## 2020-10-10 NOTE — ED PROVIDER NOTES
Patient Identification  Ashutosh Freeman is a [de-identified] y.o. male    Chief Complaint  Tachycardia      HPI  (History provided by patient)  This is a [de-identified] y.o. male with h/o PAF on xarelto, Hypothyroidism who was brought in by self for chief complaint of palpitations. Onset was sometime over night, no symptoms when he went to sleep. Woke at 1 AM with palpitations. Feels like heart is racing and irregular. No CP, SOB, PND, LE edema, lightheadedness or syncope. Taking meds as directed. REVIEW OF SYSTEMS    Constitutional:  Denies fever, chills  HENT:  Denies sore throat or ear pain   Eyes: Denies vision changes, eye pain  Cardiovascular:  Denies chest pain, syncope  Respiratory:  Denies shortness of breath, cough   GI:  Denies abdominal pain, nausea, vomiting  :  Denies dysuria, discharge  Musculoskeletal:  Denies back pain, joint pain  Skin:  Denies rash, pruritis  Neurologic:  Denies headache, focal weakness, or sensory changes     See HPI and nursing notes for additional information     I have reviewed the following nursing documentation:  Allergies: Allergies   Allergen Reactions    Lisinopril Other (See Comments)     Ace cough.  Pcn [Penicillins] Rash       Past medical history:  has a past medical history of Atrial fibrillation ([address] CAD (coronary artery disease), Cardiomyopathy ([address] Depressive disorder, Essential hypertension, Fall (10/2013), H/O cardiovascular stress test (07/08/2016), H/O echocardiogram (03/11/2013), H/O exercise stress test (05/14/2013), History of echocardiogram (08/20/2018), History of Holter monitoring (7/14/14), Impotence, Osteopenia, PAF (paroxysmal atrial fibrillation) ([address] Polyp of colon, S/P PTCA (percutaneous transluminal coronary angioplasty) (11/15/2012), Type 2 diabetes mellitus ([address] Varicose veins of both lower extremities, and VHD (valvular heart disease).     Past surgical history:  has a past surgical history that includes Cholecystectomy (2009); Tonsillectomy and adenoidectomy; Percutaneous Transluminal Coronary Angio (11/2012); and Anus surgery. Home medications:   Prior to Admission medications    Medication Sig Start Date End Date Taking? Authorizing Provider   dilTIAZem (CARDIZEM 12 HR) 120 MG extended release capsule Take 1 capsule by mouth every 12 hours as needed (palpitations and elevated HR. Please check blood pressure at home before taking medication, do not take if systolic pressure (upper number) is below 100) 10/10/20  Yes Sloan Key PA-C   Blood Pressure KIT 1 Device by Does not apply route daily as needed (Blood pressure evaluation) 10/10/20  Yes Sloan Key PA-C   losartan (COZAAR) 100 MG tablet Take 1 tablet by mouth daily 9/11/20 12/10/20  Linda Vela MD   levothyroxine (SYNTHROID) 75 MCG tablet Take 1 tablet by mouth daily 8/28/20   Paula Murphy PA-C   amLODIPine (NORVASC) 5 MG tablet Take 1 tablet by mouth daily 2/24/20   RAMA Barney - CNP   atorvastatin (LIPITOR) 20 MG tablet Take 1 tablet by mouth daily 10/29/19 8/24/20  Linda Vela MD   finasteride (PROSCAR) 5 MG tablet Take 5 mg by mouth daily    Historical Provider, MD anguianoantine [facility]) 5 MG tablet  1/28/19   Historical Provider, MD   rivaroxaban (XARELTO) 20 MG TABS tablet Take 1 tablet by mouth every 24 hours 1/19/17 8/24/20  Zechariah Westfall MD   alfuzosin (UROXATRAL) 10 MG SR tablet Take 10 mg by mouth daily    Historical Provider, MD   aspirin 81 MG tablet Take 81 mg by mouth daily. Historical Provider, MD       Social history:  reports that he quit smoking about 52 years ago. He started smoking about 67 years ago. He has a 13.00 pack-year smoking history. He has never used smokeless tobacco. He reports current alcohol use. He reports that he does not use drugs.     Family history:    Family History   Problem Relation Age of Onset    Tuberculosis Mother     Heart Surgery Father     Pacemaker Father     Depression Daughter          Exam  /74   Pulse (!) 48   Temp 97.9 °F (36.6 °C) (Oral)   Resp 12   Ht 5' 11\" (1.803 m)   Wt 190 lb (86.2 kg)   SpO2 98%   BMI 26.50 kg/m²   Nursing note and vitals reviewed. Constitutional: Well developed, well nourished. No acute distress. HENT:      Head: Normocephalic and atraumatic. Ears: External ears normal.      Nose: Nose normal.     Mouth: Membrane mucosa moist and pink. No posterior oropharynx erythema or tonsillar edema  Eyes: Anicteric sclera. No discharge, PERRL  Neck: Supple. Trachea midline. Cardiovascular: Tachycardic, irregularly irregular, no murmurs, rubs, or gallops, radial pulses 2+ bilaterally. Pulmonary/Chest: Effort normal. No respiratory distress. CTAB. No stridor. No wheezes. No rales. Abdominal: Soft. Nontender to palpation. No distension. No guarding, rebound tenderness, or evidence of ascites. : No CVA tenderness. Musculoskeletal: Moves all extremities. No gross deformity. No LE edema noted. Neurological: Alert and oriented to person, place, and time. Normal muscle tone. Skin: Warm and dry. No rash. Psychiatric: Normal mood and affect. Behavior is normal.      EKG   Please see Dr. Doreen Rodriguez' note for EKG read. Radiographs (if obtained):  [] The following radiograph was interpreted by myself in the absence of a radiologist:   [x] Radiologist's Report Reviewed:  XR CHEST PORTABLE   Final Result   No evidence for acute cardiopulmonary process.                 Labs  Results for orders placed or performed during the hospital encounter of 10/10/20   CBC Auto Differential   Result Value Ref Range    WBC 4.9 4.0 - 10.5 K/CU MM    RBC 4.75 4.6 - 6.2 M/CU MM    Hemoglobin 14.3 13.5 - 18.0 GM/DL    Hematocrit 44.2 42 - 52 %    MCV 93.1 78 - 100 FL    MCH 30.1 27 - 31 PG    MCHC 32.4 32.0 - 36.0 %    RDW 13.0 11.7 - 14.9 %    Platelets 954 986 - 648 K/CU MM    MPV 10.2 7.5 - 11.1 FL    Differential Type AUTOMATED DIFFERENTIAL Segs Relative 74.3 (H) 36 - 66 %    Lymphocytes % 14.2 (L) 24 - 44 %    Monocytes % 7.3 (H) 0 - 4 %    Eosinophils % 3.0 0 - 3 %    Basophils % 1.0 0 - 1 %    Segs Absolute 3.7 K/CU MM    Lymphocytes Absolute 0.7 K/CU MM    Monocytes Absolute 0.4 K/CU MM    Eosinophils Absolute 0.2 K/CU MM    Basophils Absolute 0.1 K/CU MM    Nucleated RBC % 0.0 %    Total Nucleated RBC 0.0 K/CU MM    Total Immature Neutrophil 0.01 K/CU MM    Immature Neutrophil % 0.2 0 - 0.43 %   CMP   Result Value Ref Range    Sodium 137 135 - 145 MMOL/L    Potassium 4.2 3.5 - 5.1 MMOL/L    Chloride 102 99 - 110 mMol/L    CO2 23 21 - 32 MMOL/L    BUN 17 6 - 23 MG/DL    CREATININE 0.9 0.9 - 1.3 MG/DL    Glucose 152 (H) 70 - 99 MG/DL    Calcium 9.2 8.3 - 10.6 MG/DL    Alb 4.2 3.4 - 5.0 GM/DL    Total Protein 6.8 6.4 - 8.2 GM/DL    Total Bilirubin 0.6 0.0 - 1.0 MG/DL    ALT 15 10 - 40 U/L    AST 15 15 - 37 IU/L    Alkaline Phosphatase 53 40 - 129 IU/L    GFR Non-African American >60 >60 mL/min/1.73m2    GFR African American >60 >60 mL/min/1.73m2    Anion Gap 12 4 - 16   Troponin   Result Value Ref Range    Troponin T <0.010 <0.01 NG/ML   Brain Natriuretic Peptide   Result Value Ref Range    Pro-.0 (H) <300 PG/ML   TSH without Reflex   Result Value Ref Range    TSH, High Sensitivity 3.180 0.270 - 4.20 uIu/ml   EKG 12 Lead   Result Value Ref Range    Ventricular Rate 122 BPM    Atrial Rate 131 BPM    QRS Duration 104 ms    Q-T Interval 340 ms    QTc Calculation (Bazett) 484 ms    R Axis -57 degrees    T Axis 61 degrees    Diagnosis       Atrial fibrillation with rapid ventricular response  Pulmonary disease pattern  Left anterior fascicular block  Nonspecific ST and T wave abnormality  Abnormal ECG  No previous ECGs available           MDM  Patient presents for palpitations. Found to be in A. fib with RVR. He is on Xarelto. He has no other symptoms. Vital signs are otherwise stable except for tachycardia.   He was given Cardizem bolus of 10 mg and started on Cardizem drip, shortly after bolus he converted to sinus bradycardia. Patient reports that he is normally bradycardic around 40-50. He reports his symptoms have fully resolved. Laboratory testing here reveals negative chest x-ray, normal troponin, only slightly elevated BNP. Consult placed to Dr. Komal Murray with cardiology, discussed history and physical exam, he recommended PRN doses of Cardizem  mg and close follow-up with Dr. Mallika Singh. Discussed with patient who is comfortable with this. Provided instructions on how to use Cardizem including checking blood pressure first.  I estimate there is LOW risk for PULMONARY EMBOLISM, ACUTE CORONARY SYNDROME, CARDIAC TAMPONADE, PNEUMOTHORAX, PNEUMONIA, PERICARDITIS, OR THORACIC AORTIC DISSECTION, thus I consider the discharge disposition reasonable. Shellie Stone and I have discussed the diagnosis and risks, and we agree with discharging home to follow-up with their primary doctor. We also discussed returning to the Emergency Department immediately if new or worsening symptoms occur. We have discussed the symptoms which are most concerning (e.g., bloody sputum, fever, worsening pain or worsening shortness of breath, vomiting, sweating) that necessitate immediate return. This patient was also seen and evaluated by Dr. Meera Smith. Final Impression  1. Paroxysmal atrial fibrillation with RVR ([facility])        Blood pressure 100/74, pulse (!) 48, temperature 97.9 °F (36.6 °C), temperature source Oral, resp. rate 12, height 5' 11\" (1.803 m), weight 190 lb (86.2 kg), SpO2 98 %. Disposition:  Discharge to home in stable condition. Patient was given scripts for the following medications. I counseled patient how to take these medications.      Discharge Medication List as of 10/10/2020 11:51 AM      START taking these medications    Details   dilTIAZem (CARDIZEM 12 HR) 120 MG extended release capsule Take 1 capsule by mouth every 12 hours as needed

## 2020-10-10 NOTE — ED NOTES
Pt became bradycardic at this time, Dr. Zuleyma Alicia and Demetra Navas, [address] notified, verbal order to stop Cardizem gtt.       Gabino Rosa RN  10/10/20 1041

## 2020-10-10 NOTE — ED NOTES
1100 paged Dr Monique Canales  10/10/20 [address]  10/10/20 1115  1120 Dr João Beaulieu returned call      Kenia Elliott  10/10/20 1121

## 2020-10-10 NOTE — ED NOTES
Pt to ED with c/o tachycardia. Pt states pulse at home was 130s. Denies CP/SOB. No missed medications.      Leon Millan RN  10/10/20 9179

## 2020-10-11 LAB
EKG ATRIAL RATE: 52 BPM
EKG DIAGNOSIS: NORMAL
EKG P AXIS: 59 DEGREES
EKG P-R INTERVAL: 172 MS
EKG Q-T INTERVAL: 436 MS
EKG QRS DURATION: 94 MS
EKG QTC CALCULATION (BAZETT): 405 MS
EKG R AXIS: -37 DEGREES
EKG T AXIS: 5 DEGREES
EKG VENTRICULAR RATE: 52 BPM

## 2020-10-11 PROCEDURE — 93010 ELECTROCARDIOGRAM REPORT: CPT | Performed by: INTERNAL MEDICINE

## 2020-10-12 ENCOUNTER — OFFICE VISIT (OUTPATIENT)
Dept: CARDIOLOGY CLINIC | Age: 80
End: 2020-10-12
Payer: MEDICARE

## 2020-10-12 VITALS
HEIGHT: 71 IN | HEART RATE: 57 BPM | WEIGHT: 196.2 LBS | SYSTOLIC BLOOD PRESSURE: 110 MMHG | BODY MASS INDEX: 27.47 KG/M2 | DIASTOLIC BLOOD PRESSURE: 70 MMHG

## 2020-10-12 PROCEDURE — 99214 OFFICE O/P EST MOD 30 MIN: CPT | Performed by: NURSE PRACTITIONER

## 2020-10-12 PROCEDURE — 93000 ELECTROCARDIOGRAM COMPLETE: CPT | Performed by: NURSE PRACTITIONER

## 2020-10-12 NOTE — ASSESSMENT & PLAN NOTE
-At or near goal No    -Elevated triglycerides  -He is to continue current medications (proscar) Hepatic function panel WNL. No abdominal pain. No myalgias.     -The nature of cardiac risk has been fully discussed with this patient. I have made him aware of his LDL target goal given his cardiovascular risk analysis. I have discussed the appropriate diet. The need for lifelong compliance in order to reduce risk is stressed. A regular exercise program is recommended to help achieve and maintain normal body weight, fitness and improve lipid balance. A written copy of a low fat, low cholesterol diet has been given to the patient.

## 2020-10-12 NOTE — ASSESSMENT & PLAN NOTE
PTCA of RCA x2 stents in 2012    Primary and secondary prevention discussed with patient:   -Low sodium cardiac diet   -exercise 30 min a day three days a week  Continue current medications losartan, Norvasc, Proscar    Will get stress test to rule out CAD involvement in PAF. Previously patient did not hit target heart rate and required nuc med. Last stress test 2016. HX of abnormal stress leading to PTCA.

## 2020-10-12 NOTE — ASSESSMENT & PLAN NOTE
-Recent episode of A. fib RVR. Patient converted with Cardizem bolus. Patient was instructed to take Cardizem 120 mg if symptoms occur. No significant electrolyte abnormality seen on labs while in emergency department. TSH within normal limits. Patient reports having a couple beers prior to the event. Recommend abstaining for caffeine and alcohol.

## 2020-10-12 NOTE — PROGRESS NOTES
[facility] ([facility] PHYSICAL [facility]  Jazmin Mcmullen  Phone: [phone]    Fax [phone]                  Laurent Parry MD, Brandi Morrison MD, Maykel Hernandez MD, MD Sharyn Muhammad MD Overton Bouillon, MD Alfreda Salles, APRUNIQUE Bauer, APRUNIQUE Medrnao, APRUNIQUE Car, APRN    CARDIOLOGY  NOTE      10/12/2020    RE: Chloe Tubbs  ([birth year])                               TO:  Dr. Adry Boyle MD  The primary cardiologist is Dr. Dee Dee Sierra   CC:   1. Essential hypertension    2. PAF (paroxysmal atrial fibrillation) ([facility] [address]    3. Coronary artery disease involving native coronary artery of native heart with angina pectoris ([facility] [address]    4. Pure hypercholesterolemia        Patient denies all of the following:  Chest Pain  Palpitations  Shortness of Breath  Edema  Dizziness  Syncope      HPI: Thank you for involving me in taking care of your patient Chloe Tubbs, who is a  [de-identified]y.o. year old male with a history as listed above. Patient presents to the office for follow up on CAD, HTN, and hyperlipidemia. Patient was seen in ER 2 days prior to office visit for A. fib RVR. Patient was given Cardizem bolus and quickly converted to sinus bradycardia. Patient explained that he woke up from sleep feeling palpitations then proceeded to the emergency department. Patient is  an active male who does walk regularly. Patient is  compliant with he medications. Patient denies any cardiac complaints or needs. Vitals:    10/12/20 1528   BP: 110/70   Pulse: 57       Current Outpatient Medications   Medication Sig Dispense Refill    dilTIAZem (CARDIZEM 12 HR) 120 MG extended release capsule Take 1 capsule by mouth every 12 hours as needed (palpitations and elevated HR.   Please check blood pressure at home before taking medication, do not take if systolic pressure (upper number) is below 100) 10 capsule 3    Blood Pressure KIT 1 Device by Does not apply route daily as needed (Blood pressure evaluation) 1 kit 0    losartan (COZAAR) 100 MG tablet Take 1 tablet by mouth daily 90 tablet 1    levothyroxine (SYNTHROID) 75 MCG tablet Take 1 tablet by mouth daily 30 tablet 5    amLODIPine (NORVASC) 5 MG tablet Take 1 tablet by mouth daily 30 tablet 5    finasteride (PROSCAR) 5 MG tablet Take 5 mg by mouth daily      memantine (NAMENDA) 5 MG tablet       alfuzosin (UROXATRAL) 10 MG SR tablet Take 10 mg by mouth daily      atorvastatin (LIPITOR) 20 MG tablet Take 1 tablet by mouth daily 90 tablet 1    rivaroxaban (XARELTO) 20 MG TABS tablet Take 1 tablet by mouth every 24 hours 90 tablet 3    aspirin 81 MG tablet Take 81 mg by mouth daily. No current facility-administered medications for this visit. Allergies: Lisinopril and Pcn [penicillins]  Past Medical History:   Diagnosis Date    Atrial fibrillation ([facility] [address]     on xarelto    CAD (coronary artery disease)     Cardiomyopathy ([facility] [address]     Depressive disorder     major    Essential hypertension     Fall 10/2013    Was walking and missed his step and fell.  H/O cardiovascular stress test 07/08/2016    EF 53% Normal study    H/O echocardiogram 03/11/2013    EF =>55%, abnormal LV diastolic function Stage 1, mild mitral and triuspid regurg,normal LV systolic function, no pericarial effusion.  H/O exercise stress test 05/14/2013    EXERCISE STRESS-negative stress test    History of echocardiogram 08/20/2018    Left ventricular systolic function is normal with an ejection fraction of 55-60%. Mild concentric left ventricular hypertrophy. The left atrium is mildly dilated. No significant valvular disease or diastolic dysfunction noted. No evidence of pericardial effusion.     History of Holter monitoring 7/14/14    1 episode of a-fib x 11 hours and 58 minutes    Impotence     Osteopenia     [facility]ox[facility] fibrillation) [facility]     Polyp of colon     S/P PTCA (percutaneous transluminal coronary angioplasty) 11/15/2012    stenting of 2 sites in RCA    Type 2 diabetes mellitus ([facility] [address]     Varicose veins of both lower extremities     VHD (valvular heart disease)      Past Surgical History:   Procedure Laterality Date    ANUS SURGERY      anal fistula-70's    CHOLECYSTECTOMY  2009    PTCA  2012    TONSILLECTOMY AND ADENOIDECTOMY       Family History   Problem Relation Age of Onset    Tuberculosis Mother     Heart Surgery Father     Pacemaker Father     Depression Daughter      Social History     Tobacco Use    Smoking status: Former Smoker     Packs/day: 1.00     Years: 13.00     Pack years: 13.00     Start date:      Last attempt to quit: 10/29/1967     Years since quittin.9    Smokeless tobacco: Never Used   Substance Use Topics    Alcohol use:  Yes     Alcohol/week: 0.0 standard drinks     Comment: RARE, 2 cups coffee daily        Review of Systems - History obtained from the patient  General: negative for - fatigue, malaise, night sweats, weight gain  Psychological: negative for - anxiety, depression, sleep disturbances  Ophthalmic: negative for - blurry vision, loss of vision  ENT: negative for - headaches, vertigo, visual changes  Hematological and Lymphatic: negative for - bleeding problems, blood clots, bruising, fatigue or pallor  Endocrine: negative for - malaise/lethargy, palpitations, unexpected weight changes  Respiratory: negative for - cough, orthopnea, shortness of breath or tachypnea  Cardiovascular: negative for - chest pain, dyspnea on exertion, edema or palpitations  Gastrointestinal: no abdominal pain, change in bowel habits, or black or bloody stools  Genito-Urinary: no dysuria, trouble voiding, or hematuria  Musculoskeletal: negative for - gait disturbance, pain, swelling    Neurological: negative for - confusion, dizziness, impaired coordination/balance or numbness/tingling    Objective:      Physical Exam:  /70   Pulse 57   Ht 5' 11\" (1.803 m)   Wt 196 lb 3.2 oz (89 kg)   BMI 27.36 kg/m²   Wt Readings from Last 3 Encounters:   10/12/20 196 lb 3.2 oz (89 kg)   10/10/20 190 lb (86.2 kg)   08/24/20 203 lb 6.4 oz (92.3 kg)     Body mass index is 27.36 kg/m². GENERAL - Alert, oriented, pleasant, in no apparent distress. Head unremarkable  Eyes - pupils equal and reactive to light - bilateral conjunctiva are pink: sclera are white   ENT - external ears intact, nose is intact:  tongue is midline pink and moist  Neck - Supple. No jugular venous distention noted. No carotid bruits appreciated. Cardiovascular - Normal S1 and S2:  murmur appreciated No, No gallop. Regular rate- Yes,  rhythm regular-Yes. Extremities - No cyanosis, clubbing, no edema to lower legs. Pulmonary - No respiratory distress. No wheezes or rales. Chest is clear  Pulses: Bilateral radial and pedal pulses normal  Abdomen -  Soft no tenderness, non distended   Musculoskeletal - Normal movement of all extremities   Neurologic - alert and oriented: There are no gross focal neurologic abnormalities. Skin-  No rash: No echymosis   Affect- normal mood and pleasant       DATA:  No results found for: CKTOTAL, CKMB, CKMBINDEX, TROPONINI  BNP:  No results found for: BNP  PT/INR:  No results found for: PTINR  Lab Results   Component Value Date    LABA1C 7.0 (H) 08/25/2020    LABA1C 6.5 10/29/2019     Lab Results   Component Value Date    CHOL 117 08/25/2020    TRIG 239 (H) 08/25/2020    HDL 24 (L) 08/25/2020    LDLCALC 58 06/24/2019    LDLDIRECT 53 08/25/2020     Lab Results   Component Value Date    ALT 15 10/10/2020    AST 15 10/10/2020     TSH:    Lab Results   Component Value Date    TSH 3.70 06/24/2019       Echo:2018  Left ventricular systolic function is normal with an ejection fraction of   55-60%. Mild concentric left ventricular hypertrophy. No evidence of diastolic dysfunction.    Left Ventricular chamber size is Normal.   No regional wall motion

## 2020-10-12 NOTE — LETTER
[facility]    Dr. Roosevelt Byers  1940  [id number]    Have you had any Chest Pain - No      Have you had any Shortness of Breath - No      Have you had any dizziness - No      Have you had any palpitations - No      Is the patient on any of the following medications -   If Yes DO EKG    Do you have any edema -no    Do you have a surgery or procedure scheduled in the near future - No      Ask patient if [facility] if they are not already signed [facility]    Check to see if we have an E-MAIL on file for the patient    Check medication list thoroughly!!!  BE SURE TO ASK PATIENT IF THEY NEED MEDICATION REFILLS

## 2020-10-16 ENCOUNTER — PROCEDURE VISIT (OUTPATIENT)
Dept: CARDIOLOGY CLINIC | Age: 80
End: 2020-10-16
Payer: MEDICARE

## 2020-10-16 LAB
LV EF: 49 %
LVEF MODALITY: NORMAL

## 2020-10-16 PROCEDURE — 93015 CV STRESS TEST SUPVJ I&R: CPT | Performed by: INTERNAL MEDICINE

## 2020-10-16 PROCEDURE — A9500 TC99M SESTAMIBI: HCPCS | Performed by: INTERNAL MEDICINE

## 2020-10-16 PROCEDURE — 78452 HT MUSCLE IMAGE SPECT MULT: CPT | Performed by: INTERNAL MEDICINE

## 2020-10-19 RX ORDER — ATORVASTATIN CALCIUM 20 MG/1
TABLET, FILM COATED ORAL
Qty: 90 TABLET | Refills: 1 | Status: SHIPPED | OUTPATIENT
Start: 2020-10-19 | End: 2021-02-24 | Stop reason: SDUPTHER

## 2020-10-28 ENCOUNTER — OFFICE VISIT (OUTPATIENT)
Dept: CARDIOLOGY CLINIC | Age: 80
End: 2020-10-28
Payer: MEDICARE

## 2020-10-28 VITALS
HEIGHT: 71 IN | BODY MASS INDEX: 27.44 KG/M2 | WEIGHT: 196 LBS | DIASTOLIC BLOOD PRESSURE: 74 MMHG | SYSTOLIC BLOOD PRESSURE: 122 MMHG | HEART RATE: 68 BPM

## 2020-10-28 PROCEDURE — 3051F HG A1C>EQUAL 7.0%<8.0%: CPT | Performed by: INTERNAL MEDICINE

## 2020-10-28 PROCEDURE — 99214 OFFICE O/P EST MOD 30 MIN: CPT | Performed by: INTERNAL MEDICINE

## 2020-10-28 RX ORDER — TAMSULOSIN HYDROCHLORIDE 0.4 MG/1
0.4 CAPSULE ORAL DAILY
COMMUNITY
Start: 2020-10-25 | End: 2021-02-24

## 2020-10-28 NOTE — PROGRESS NOTES
Please check blood pressure at home before taking medication, do not take if systolic pressure (upper number) is below 100) 10 capsule 3    Blood Pressure KIT 1 Device by Does not apply route daily as needed (Blood pressure evaluation) 1 kit 0    losartan (COZAAR) 100 MG tablet Take 1 tablet by mouth daily 90 tablet 1    levothyroxine (SYNTHROID) 75 MCG tablet Take 1 tablet by mouth daily 30 tablet 5    amLODIPine (NORVASC) 5 MG tablet Take 1 tablet by mouth daily 30 tablet 5    finasteride (PROSCAR) 5 MG tablet Take 5 mg by mouth daily      memantine (NAMENDA) 5 MG tablet       rivaroxaban (XARELTO) 20 MG TABS tablet Take 1 tablet by mouth every 24 hours 90 tablet 3    alfuzosin (UROXATRAL) 10 MG SR tablet Take 10 mg by mouth daily      aspirin 81 MG tablet Take 81 mg by mouth daily. No current facility-administered medications for this visit. Allergies: Lisinopril and Pcn [penicillins]  Past Medical History:   Diagnosis Date    Atrial fibrillation ([facility] [address]     on xarelto    CAD (coronary artery disease)     Cardiomyopathy ([facility] [address]     Depressive disorder     major    Essential hypertension     Fall 10/2013    Was walking and missed his step and fell.  H/O cardiovascular stress test 07/08/2016    EF 53% Normal study    H/O echocardiogram 03/11/2013    EF =>55%, abnormal LV diastolic function Stage 1, mild mitral and triuspid regurg,normal LV systolic function, no pericarial effusion.  H/O exercise stress test 05/14/2013    EXERCISE STRESS-negative stress test    History of echocardiogram 08/20/2018    Left ventricular systolic function is normal with an ejection fraction of 55-60%. Mild concentric left ventricular hypertrophy. The left atrium is mildly dilated. No significant valvular disease or diastolic dysfunction noted. No evidence of pericardial effusion.     History of Holter monitoring 7/14/14    1 episode of a-fib x 11 hours and 58 minutes    Impotence     Osteopenia     PAF (paroxysmal atrial fibrillation) (HCC)     Polyp of colon     S/P PTCA (percutaneous transluminal coronary angioplasty) 11/15/2012    stenting of 2 sites in RCA    Type 2 diabetes mellitus ([facility] [address]     Varicose veins of both lower extremities     VHD (valvular heart disease)      Past Surgical History:   Procedure Laterality Date    ANUS SURGERY      anal fistula-70's    CHOLECYSTECTOMY      PTCA  2012    TONSILLECTOMY AND ADENOIDECTOMY        As reviewed   Family History   Problem Relation Age of Onset    Tuberculosis Mother     Heart Surgery Father     Pacemaker Father     Depression Daughter      Social History     Tobacco Use    Smoking status: Former Smoker     Packs/day: 1.00     Years: 13.00     Pack years: 13.00     Start date:      Last attempt to quit: 10/29/1967     Years since quittin.0    Smokeless tobacco: Never Used   Substance Use Topics    Alcohol use: Yes     Alcohol/week: 0.0 standard drinks     Comment: RARE, 2 cups coffee daily      Review of Systems:    Constitutional: Negative for diaphoresis and fatigue  Psychological:Negative for anxiety or depression  HENT: Negative for headaches, nasal congestion, sinus pain or vertigo  Eyes: Negative for visual disturbance.    Endocrine: Negative for polydipsia/polyuria  Respiratory: Negative for shortness of breath  Cardiovascular: Negative for chest pain, dyspnea on exertion, claudication, edema, irregular heartbeat, murmur, palpitations or shortness of breath  Gastrointestinal: Negative for abdominal pain or heartburn  Genito-Urinary: Negative for urinary frequency/urgency  Musculoskeletal: Negative for muscle pain, muscular weakness, negative for pain in arm and leg or swelling in foot and leg  Neurological: Negative for dizziness, headaches, memory loss, numbness/tingling, visual changes, syncope  Dermatological: Negative for rash    Objective:  /74   Pulse 68   Ht 5' 11\" (1.803 m)   Wt 196 lb (88.9 kg)   BMI 27.34 kg/m²   Wt Readings from Last 3 Encounters:   10/28/20 196 lb (88.9 kg)   10/12/20 196 lb 3.2 oz (89 kg)   10/10/20 190 lb (86.2 kg)     Body mass index is 27.34 kg/m². Patient-Reported Vitals 7/1/2020   Patient-Reported Weight 200lbs   Patient-Reported Height 5' 11\"   Patient-Reported Systolic 556   Patient-Reported Diastolic 82   Patient-Reported Pulse 57       Vitals:    10/28/20 0806   BP: 122/74   Pulse: 68   Weight: 196 lb (88.9 kg)   Height: 5' 11\" (1.803 m)      GENERAL - Alert, oriented, pleasant, in no apparent distress. EYES: No jaundice, no conjunctival pallor. SKIN: It is warm & dry. No rashes. No Echhymosis    HEENT - No clinically significant abnormalities seen. Neck - Supple. No jugular venous distention noted. No carotid bruits. Cardiovascular - Normal S1 and S2 without obvious murmur or gallop. Extremities - No cyanosis, clubbing, No significant edema but large varicose veins noted. .    Pulmonary - No respiratory distress. No wheezes or rales. Abdomen - No masses, tenderness, or organomegaly. Musculoskeletal - No significant edema. No joint deformities. No muscle wasting. Neurologic - Cranial nerves II through XII are grossly intact. There were no gross focal neurologic abnormalities.     Lab Review   Lab Results   Component Value Date    TROPONINT <0.010 10/10/2020    TROPONINT <0.010 01/10/2016     BNP:    Lab Results   Component Value Date    PROBNP 393.0 10/10/2020    PROBNP 94.77 07/09/2014     PT/INR:    Lab Results   Component Value Date    INR 1.13 11/12/2012     Lab Results   Component Value Date    LABA1C 7.0 (H) 08/25/2020    LABA1C 6.5 10/29/2019     Lab Results   Component Value Date    WBC 4.9 10/10/2020    WBC 5.5 08/25/2020    HCT 44.2 10/10/2020    HCT 41.0 (L) 08/25/2020    MCV 93.1 10/10/2020    MCV 94.9 08/25/2020     10/10/2020     08/25/2020     Lab Results   Component Value Date    CHOL 117 08/25/2020    CHOL 119 06/24/2019    TRIG 239 (H) 08/25/2020    TRIG 152 (H) 06/24/2019    HDL 24 (L) 08/25/2020    HDL 31 (L) 06/24/2019    LDLCALC 58 06/24/2019    LDLCALC 73 10/16/2017    LDLDIRECT 53 08/25/2020    LDLDIRECT 75 05/13/2013     Lab Results   Component Value Date    ALT 15 10/10/2020    ALT 13 08/25/2020    AST 15 10/10/2020    AST 14 (L) 08/25/2020     BMP:    Lab Results   Component Value Date     10/10/2020     08/25/2020    K 4.2 10/10/2020    K 4.2 08/25/2020     10/10/2020     08/25/2020    CO2 23 10/10/2020    CO2 23 08/25/2020    BUN 17 10/10/2020    BUN 21 08/25/2020    CREATININE 0.9 10/10/2020    CREATININE 1.0 08/25/2020     CMP:   Lab Results   Component Value Date     10/10/2020     08/25/2020    K 4.2 10/10/2020    K 4.2 08/25/2020     10/10/2020     08/25/2020    CO2 23 10/10/2020    CO2 23 08/25/2020    BUN 17 10/10/2020    BUN 21 08/25/2020    CREATININE 0.9 10/10/2020    CREATININE 1.0 08/25/2020    PROT 6.8 10/10/2020    PROT 6.5 08/25/2020    PROT 6.9 12/09/2013     TSH:    Lab Results   Component Value Date    TSH 3.70 06/24/2019    TSH 2.650 01/10/2019    TSHHS 3.180 10/10/2020    TSHHS 12.770 08/25/2020       QUALITY MEASURES REVIEWED:  1.CAD:Patient is taking anti platelet agent:Yes  2. DYSLIPIDEMIA: Patient is on cholesterol lowering medication:Yes  3. Beta-Blocker therapy for CAD, if prior Myocardial Infarction:Yes  4. Atrial fibrillation & anticoagulation therapy No      Impression:    1. Coronary artery disease involving native coronary artery of native heart with angina pectoris ([facility] [address]    2. Pure hypercholesterolemia    3. Ischemic cardiomyopathy    4. VHD (valvular heart disease)    5. Varicose veins of both lower extremities, unspecified whether complicated    6. Type 2 diabetes mellitus without complication, without long-term current use of insulin (HCC)    7.  Essential hypertension       Patient Active Problem List   Diagnosis Code    Pure hypercholesterolemia E78.00  Cardiomyopathy ([address] I42.9    VHD (valvular heart disease) I38    Coronary artery disease involving native coronary artery of native heart with angina pectoris ([facility]) I25.119    H/O echocardiogram Z92.89    H/O exercise stress test Z92.89    PAF (paroxysmal atrial fibrillation) ([facility]) I48.0    Primary osteoarthritis involving multiple joints M89.49    Varicose veins of both lower extremities I83.93    Impotence N52.9    Polyp of colon K63.5    Osteopenia M85.80    Type 2 diabetes mellitus (HCC) E11.9    Atrial fibrillation (HCC) I48.91    Essential hypertension I10    Acquired hypothyroidism E03.9    Depressive disorder F32.9       Assessment & Plan:    CAD:Yes. Current Cardiolite abnormality is not severe enough to warrant further testing.   clinically stable. Patient is on optimal medical regimen ( see medication list above )  - August Leal is currently  asymptomatic from CAD.          - changes in  treatment:   no           - Testing ordered:  no  [place] classification: 1  [place] RISK SCORE:  [place]ALIE RISK SCORE:  HTN:well controlled on current medical regimen, see list above.              - changes in  treatment:   no   CARDIOMYOPATHY: None known   CONGESTIVE HEART FAILURE: NO KNOWN HISTORY.      VHD: No significant VHD noted  DYSLIPIDEMIA: Patient's profile is at / near [employer] is low                                    Tolerating current medical regimen wellyes,                                                           See most recent Lab values in Labs section above.   Diabetes mellitis: .yes, Diet controlled.                                     Managed by family MD                                     BS under good control yes,                                      Hgb A1c avilable yes,   OTHER RELEVANT DIAGNOSIS:as noted in patient's active problem list:  CVI: Seems patient has C5 venous disease.  Patien to wear compression

## 2020-10-28 NOTE — PROGRESS NOTES
[id number] Score for Atrial Fibrillation Stroke Risk   Risk   Factors  Component Value   C CHF No 0   H HTN Yes 1   A2 Age >= 76 Yes,  [de-identified] y.o.) 2   D DM Yes 1   S2 Prior Stroke/TIA No 0   V Vascular Disease No 0   A Age 74-69 No,  [de-identified] y.o.) 0   Sc Sex male 0    [id number]  Score  4   Score last updated 10/28/20 8:05 AM EDT

## 2020-10-28 NOTE — LETTER
Estefania Galloway  1940  [id number]    Have you had any Chest Pain - No      Have you had any Shortness of Breath - No      Have you had any dizziness - No    Have you had any palpitations - No      Do you have any edema -No    Do you have a surgery or procedure scheduled in the near future - No

## 2020-11-09 ENCOUNTER — PROCEDURE VISIT (OUTPATIENT)
Dept: CARDIOLOGY CLINIC | Age: 80
End: 2020-11-09
Payer: MEDICARE

## 2020-11-09 PROCEDURE — 93970 EXTREMITY STUDY: CPT | Performed by: INTERNAL MEDICINE

## 2020-11-24 ENCOUNTER — TELEPHONE (OUTPATIENT)
Dept: CARDIOLOGY CLINIC | Age: 80
End: 2020-11-24

## 2020-11-24 NOTE — TELEPHONE ENCOUNTER
Patient returned call for results and voiced understanding. Patient states he has stockings that he wears. Patient has f/u OV with [facility] at the beginning of January.

## 2020-11-24 NOTE — TELEPHONE ENCOUNTER
LM on VM for patient to return call for results. Need to know if patient needs a prescription for compression stockings. Patient has f/u OV with [facility] scheduled for 1/4/21. No significant reflux noted in the veins of the right lower extremity. Significant reflux noted in the Left CFV (1.4s) and GSV at the SFJ (1.1s) and mid calf (0.7s). No evidence of DVT or SVT in the bilateral common femoral vein, femoral vein, popliteal vein, greater saphenous vein or small saphenous vein. Recommendations   Advice thigh high pressure stockings, 20 to 30 mm of Hg. Keep feet elevated. Increase walking. OV with me in 6 weeks.

## 2020-12-02 ENCOUNTER — TELEPHONE (OUTPATIENT)
Dept: FAMILY MEDICINE CLINIC | Age: 80
End: 2020-12-02

## 2020-12-02 NOTE — TELEPHONE ENCOUNTER
[facility] CAN YOU DO ANOTHER REF FOR A NEURO DR AND NOT AT [address].  PLEASE CALL WIFE AND IF SHE DOESN'T ANSWER LM

## 2020-12-02 NOTE — TELEPHONE ENCOUNTER
Let wife know that we have a second group in [place] out of [employer].  We can make a referral to [facility] neurologists if she wishes.   (Riana please return this to me if she wants me to send that referral)

## 2020-12-03 NOTE — TELEPHONE ENCOUNTER
At your request.  I sent a consult in for Dr. J Luis Haines. He is the only neurologist in [facility].

## 2020-12-08 LAB
ALBUMIN SERPL-MCNC: 3.8 G/DL
ALP BLD-CCNC: 54 U/L
ALT SERPL-CCNC: 25 U/L
ANION GAP SERPL CALCULATED.3IONS-SCNC: 8 MMOL/L
AST SERPL-CCNC: 15 U/L
AVERAGE GLUCOSE: NORMAL
BILIRUB SERPL-MCNC: 0.6 MG/DL (ref 0.1–1.4)
BUN BLDV-MCNC: 19 MG/DL
CALCIUM SERPL-MCNC: 9 MG/DL
CHLORIDE BLD-SCNC: 108 MMOL/L
CHOLESTEROL, TOTAL: 98 MG/DL
CHOLESTEROL/HDL RATIO: ABNORMAL
CO2: 23 MMOL/L
CREAT SERPL-MCNC: 1.1 MG/DL
GFR CALCULATED: NORMAL
GLUCOSE BLD-MCNC: 129 MG/DL
HBA1C MFR BLD: 6.4 %
HDLC SERPL-MCNC: 28 MG/DL (ref 35–70)
LDL CHOLESTEROL CALCULATED: 39 MG/DL (ref 0–160)
NONHDLC SERPL-MCNC: 70 MG/DL
POTASSIUM SERPL-SCNC: 3.9 MMOL/L
SODIUM BLD-SCNC: 139 MMOL/L
TOTAL PROTEIN: NORMAL
TRIGL SERPL-MCNC: 157 MG/DL
TSH SERPL DL<=0.05 MIU/L-ACNC: 3.93 UIU/ML
VLDLC SERPL CALC-MCNC: ABNORMAL MG/DL

## 2021-01-04 ENCOUNTER — OFFICE VISIT (OUTPATIENT)
Dept: CARDIOLOGY CLINIC | Age: 81
End: 2021-01-04
Payer: COMMERCIAL

## 2021-01-04 VITALS
BODY MASS INDEX: 27.19 KG/M2 | HEART RATE: 60 BPM | DIASTOLIC BLOOD PRESSURE: 64 MMHG | SYSTOLIC BLOOD PRESSURE: 130 MMHG | WEIGHT: 194.2 LBS | HEIGHT: 71 IN

## 2021-01-04 DIAGNOSIS — I25.119 CORONARY ARTERY DISEASE INVOLVING NATIVE CORONARY ARTERY OF NATIVE HEART WITH ANGINA PECTORIS (HCC): ICD-10-CM

## 2021-01-04 DIAGNOSIS — E11.9 TYPE 2 DIABETES MELLITUS WITHOUT COMPLICATION, WITHOUT LONG-TERM CURRENT USE OF INSULIN (HCC): ICD-10-CM

## 2021-01-04 DIAGNOSIS — I10 ESSENTIAL HYPERTENSION: ICD-10-CM

## 2021-01-04 DIAGNOSIS — I83.93 VARICOSE VEINS OF BOTH LOWER EXTREMITIES, UNSPECIFIED WHETHER COMPLICATED: Primary | ICD-10-CM

## 2021-01-04 DIAGNOSIS — I48.0 PAF (PAROXYSMAL ATRIAL FIBRILLATION) (HCC): ICD-10-CM

## 2021-01-04 DIAGNOSIS — I25.5 ISCHEMIC CARDIOMYOPATHY: ICD-10-CM

## 2021-01-04 DIAGNOSIS — I38 VHD (VALVULAR HEART DISEASE): ICD-10-CM

## 2021-01-04 DIAGNOSIS — I48.0 PAROXYSMAL ATRIAL FIBRILLATION (HCC): ICD-10-CM

## 2021-01-04 DIAGNOSIS — E78.00 PURE HYPERCHOLESTEROLEMIA: ICD-10-CM

## 2021-01-04 PROCEDURE — 99214 OFFICE O/P EST MOD 30 MIN: CPT | Performed by: INTERNAL MEDICINE

## 2021-01-04 RX ORDER — AMLODIPINE BESYLATE 5 MG/1
5 TABLET ORAL DAILY
Qty: 30 TABLET | Refills: 5 | Status: SHIPPED | OUTPATIENT
Start: 2021-01-04 | End: 2021-08-03

## 2021-01-04 NOTE — PROGRESS NOTES
Enrrique Schmidt [id number] Score for Atrial Fibrillation Stroke Risk   Risk   Factors  Component Value   C CHF No 0   H HTN Yes 1   A2 Age >= 76 Yes,  [de-identified] y.o.) 2   D DM Yes 1   S2 Prior Stroke/TIA No 0   V Vascular Disease No 0   A Age 74-69 No,  [de-identified] y.o.) 0   Sc Sex male 0    [id number]  Score  4   Score last updated 1/4/21 8:82 PM EST    Click here for a link to the UpToDate guideline \"Atrial Fibrillation: Anticoagulation therapy to prevent embolization    Disclaimer: Risk Score calculation is dependent on accuracy of patient problem list and past encounter diagnosis.

## 2021-01-04 NOTE — LETTER
[address]  [address] [address]  Phone: [phone]  Fax: [phone]    Marc Etienne MD        January 4, 2021     [address]    Patient: Wilbert Jacob  MR Number: [id number]  YOB: 1940  Date of Visit: 1/4/2021    Dear Dr. Oni Ortiz: Thank you for the request for consultation for Sanjay Simental to me for the evaluation of CAD/ CVI. Below are the relevant portions of my assessment and plan of care. If you have questions, please do not hesitate to call me. I look forward to following Genny Orellana along with you.     Sincerely,        Marc Etienne MD

## 2021-01-04 NOTE — PROGRESS NOTES
OFFICE PROGRESS NOTES      Navi Mccoy is a [de-identified] y.o. male who has    CHIEF COMPLAINT AS FOLLOWS:  CHEST PAIN: Patient denies any C/O chest pains at this time.      SOB: No C/O SOB at this time.               LEG EDEMA:  B/L Lower extremity edema is present L > R.   PALPITATIONS: No C/O  of palpitations.   DIZZINESS: No C/O  Dizziness.   SYNCOPE: None   OTHER: Still C/O PROMNANT LEG VEINS, SKIN DISCOLORATION, HEALED SORES, RESTLESS LEGS, NIGHT TIME CRAMPING & TIRED LEGS. HPI: Patient is here for F/U on his PAF, VHD, CAD, HTN & Dyslipidemia problems. He does not have any complaints at this time.     Thera Spurling has the following history recorded in care path:  Patient Active Problem List    Diagnosis Date Noted    [facility]      Priority: Low    H/O exercise stress test 05/14/2013     Priority: Low    H/O echocardiogram 03/11/2013     Priority: Low    Coronary artery disease involving native coronary artery of native heart with angina pectoris ([facility] 11/15/2012     Priority: Low    Pure hypercholesterolemia      Priority: Low    Cardiomyopathy ([facility]      Priority: Low    VHD (valvular heart disease)      Priority: Low    Primary osteoarthritis involving multiple joints     Varicose veins of both lower extremities     Impotence     Polyp of colon     Osteopenia     Type 2 diabetes mellitus (HCC)     Essential hypertension     Acquired hypothyroidism     Depressive disorder      Current Outpatient Medications   Medication Sig Dispense Refill    amLODIPine (NORVASC) 5 MG tablet Take 1 tablet by mouth daily 30 tablet 5    tamsulosin (FLOMAX) 0.4 MG capsule Take 0.4 mg by mouth daily      atorvastatin (LIPITOR) 20 MG tablet TAKE ONE TABLET BY MOUTH DAILY 90 tablet 1    dilTIAZem (CARDIZEM 12 HR) 120 MG extended release capsule Take 1 capsule by mouth every 12 hours as needed (palpitations and elevated HR. Please check blood pressure at home before taking medication, do not take if systolic pressure (upper number) is below 100) 10 capsule 3    Blood Pressure KIT 1 Device by Does not apply route daily as needed (Blood pressure evaluation) 1 kit 0    losartan (COZAAR) 100 MG tablet Take 1 tablet by mouth daily 90 tablet 1    levothyroxine (SYNTHROID) 75 MCG tablet Take 1 tablet by mouth daily 30 tablet 5    finasteride (PROSCAR) 5 MG tablet Take 5 mg by mouth daily      memantine (NAMENDA) 5 MG tablet       rivaroxaban (XARELTO) 20 MG TABS tablet Take 1 tablet by mouth every 24 hours 90 tablet 3    alfuzosin (UROXATRAL) 10 MG SR tablet Take 10 mg by mouth daily      aspirin 81 MG tablet Take 81 mg by mouth daily. No current facility-administered medications for this visit. Allergies: Lisinopril and Pcn [penicillins]  Past Medical History:   Diagnosis Date    Atrial fibrillation ([facility]     on xarelto    CAD (coronary artery disease)     Cardiomyopathy ([facility]     Depressive disorder     major    Essential hypertension     Fall 10/2013    Was walking and missed his step and fell.  H/O cardiovascular stress test 07/08/2016    EF 53% Normal study    H/O echocardiogram 03/11/2013    EF =>55%, abnormal LV diastolic function Stage 1, mild mitral and triuspid regurg,normal LV systolic function, no pericarial effusion.  H/O exercise stress test 05/14/2013    EXERCISE STRESS-negative stress test    History of echocardiogram 08/20/2018    Left ventricular systolic function is normal with an ejection fraction of 55-60%. Mild concentric left ventricular hypertrophy. The left atrium is mildly dilated. No significant valvular disease or diastolic dysfunction noted. No evidence of pericardial effusion.     History of Holter monitoring 7/14/14    1 episode of a-fib x 11 hours and 58 minutes    Hx of Venous Doppler ultrasound 11/09/2020    No significant reflux in RLE, Significant reflux in LGSV, No DVT or SVT    Impotence     Osteopenia     PAF (paroxysmal atrial fibrillation) (HCC)     Polyp of colon     S/P PTCA (percutaneous transluminal coronary angioplasty) 11/15/2012    stenting of 2 sites in RCA    Type 2 diabetes mellitus ([facility]     Varicose veins of both lower extremities     VHD (valvular heart disease)      Past Surgical History:   Procedure Laterality Date    ANUS SURGERY      anal fistula-70's    CHOLECYSTECTOMY  2009    PTCA  2012    TONSILLECTOMY AND ADENOIDECTOMY        As reviewed   Family History   Problem Relation Age of Onset    Tuberculosis Mother     Heart Surgery Father     Pacemaker Father     Depression Daughter      Social History     Tobacco Use    Smoking status: Former Smoker     Packs/day: 1.00     Years: 13.00     Pack years: 13.00     Start date:      Quit date: 10/29/1967     Years since quittin.2    Smokeless tobacco: Never Used   Substance Use Topics    Alcohol use: Not Currently     Alcohol/week: 0.0 standard drinks     Comment: RARE, 2 cups coffee daily      Review of Systems:    Constitutional: Negative for diaphoresis and fatigue  Psychological:Negative for anxiety or depression  HENT: Negative for headaches, nasal congestion, sinus pain or vertigo  Eyes: Negative for visual disturbance.    Endocrine: Negative for polydipsia/polyuria  Respiratory: Negative for shortness of breath  Cardiovascular: Negative for chest pain, dyspnea on exertion, claudication, edema, irregular heartbeat, murmur, palpitations or shortness of breath  Gastrointestinal: Negative for abdominal pain or heartburn  Genito-Urinary: Negative for urinary frequency/urgency  Musculoskeletal: Negative for muscle pain, muscular weakness, negative for pain in arm and leg or swelling in foot and leg  Neurological: Negative for dizziness, headaches, memory loss, numbness/tingling, visual changes, syncope  Dermatological: Negative for rash    Objective:  /64   Pulse 60   Ht 5' 11\" (1.803 m)   Wt 194 lb 3.2 oz (88.1 kg)   BMI 27.09 kg/m²   Wt Readings from Last 3 Encounters:   01/04/21 194 lb 3.2 oz (88.1 kg)   10/28/20 196 lb (88.9 kg)   10/12/20 196 lb 3.2 oz (89 kg)     Body mass index is 27.09 kg/m². Patient-Reported Vitals 7/1/2020   Patient-Reported Weight 200lbs   Patient-Reported Height 5' 11\"   Patient-Reported Systolic 949   Patient-Reported Diastolic 82   Patient-Reported Pulse 57       Vitals:    01/04/21 1455   BP: 130/64   Pulse: 60   Weight: 194 lb 3.2 oz (88.1 kg)   Height: 5' 11\" (1.803 m)      GENERAL - Alert, oriented, pleasant, in no apparent distress. EYES: No jaundice, no conjunctival pallor. SKIN: It is warm & dry. No rashes. No Echhymosis    HEENT - No clinically significant abnormalities seen. Neck - Supple. No jugular venous distention noted. No carotid bruits. Cardiovascular - Normal S1 and S2 without obvious murmur or gallop. Extremities - No cyanosis, clubbing, or significant edema. Pulmonary - No respiratory distress. No wheezes or rales. Abdomen - No masses, tenderness, or organomegaly. Musculoskeletal - No significant edema. No joint deformities. No muscle wasting. Neurologic - Cranial nerves II through XII are grossly intact. There were no gross focal neurologic abnormalities.     Lab Review   Lab Results   Component Value Date    TROPONINT <0.010 10/10/2020    TROPONINT <0.010 01/10/2016     BNP:    Lab Results   Component Value Date    PROBNP 393.0 10/10/2020    PROBNP 94.77 07/09/2014     PT/INR:    Lab Results   Component Value Date    INR 1.13 11/12/2012     Lab Results   Component Value Date    LABA1C 7.0 (H) 08/25/2020    LABA1C 6.5 10/29/2019     Lab Results   Component Value Date    WBC 4.9 10/10/2020    WBC 5.5 08/25/2020    HCT 44.2 10/10/2020    HCT 41.0 (L) 08/25/2020    MCV 93.1 10/10/2020    MCV 94.9 08/25/2020     10/10/2020     08/25/2020     Lab Results Component Value Date    CHOL 117 08/25/2020    CHOL 119 06/24/2019    TRIG 239 (H) 08/25/2020    TRIG 152 (H) 06/24/2019    HDL 24 (L) 08/25/2020    HDL 31 (L) 06/24/2019    LDLCALC 58 06/24/2019    LDLCALC 73 10/16/2017    LDLDIRECT 53 08/25/2020    LDLDIRECT 75 05/13/2013     Lab Results   Component Value Date    ALT 15 10/10/2020    ALT 13 08/25/2020    AST 15 10/10/2020    AST 14 (L) 08/25/2020     BMP:    Lab Results   Component Value Date     10/10/2020     08/25/2020    K 4.2 10/10/2020    K 4.2 08/25/2020     10/10/2020     08/25/2020    CO2 23 10/10/2020    CO2 23 08/25/2020    BUN 17 10/10/2020    BUN 21 08/25/2020    CREATININE 0.9 10/10/2020    CREATININE 1.0 08/25/2020     CMP:   Lab Results   Component Value Date     10/10/2020     08/25/2020    K 4.2 10/10/2020    K 4.2 08/25/2020     10/10/2020     08/25/2020    CO2 23 10/10/2020    CO2 23 08/25/2020    BUN 17 10/10/2020    BUN 21 08/25/2020    CREATININE 0.9 10/10/2020    CREATININE 1.0 08/25/2020    PROT 6.8 10/10/2020    PROT 6.5 08/25/2020    PROT 6.9 12/09/2013     TSH:    Lab Results   Component Value Date    TSH 3.70 06/24/2019    TSH 2.650 01/10/2019    TSHHS 3.180 10/10/2020    TSHHS 12.770 08/25/2020       QUALITY MEASURES REVIEWED:  1.CAD:Patient is taking anti platelet agent:Yes  2. DYSLIPIDEMIA: Patient is on cholesterol lowering medication:Yes  3. Beta-Blocker therapy for CAD, if prior Myocardial Infarction:Yes  4. Atrial fibrillation & anticoagulation therapy No  5. Discussed weight management strategies. Impression:    1. Varicose veins of both lower extremities, unspecified whether complicated    2. Pure hypercholesterolemia    3. Ischemic cardiomyopathy    4. VHD (valvular heart disease)    5. Coronary artery disease involving native coronary artery of native heart with angina pectoris ([address]    6. PA[facility]) [facility]    7.  Type 2 diabetes mellitus without complication, without long-term current use of insulin ([facility]    8. [facility]oxysmal atrial fibrillation ([facility])    9. Essential hypertension       Patient Active Problem List   Diagnosis Code    Pure hypercholesterolemia E78.00    Cardiomyopathy ([facility] I42.9    VHD (valvular heart disease) I38    Coronary artery disease involving native coronary artery of native heart with angina pectoris ([facility] I25.119    H/O echocardiogram Z92.89    H/O exercise stress test Z92.89    PAF (paroxysmal atrial fibrillation) ([facility]) I48.0    Primary osteoarthritis involving multiple joints M89.49    Varicose veins of both lower extremities I83.93    Impotence N52.9    Polyp of colon K63.5    Osteopenia M85.80    Type 2 diabetes mellitus (HCC) E11.9    Essential hypertension I10    Acquired hypothyroidism E03.9    Depressive disorder F32.9       Assessment & Plan:    CAD:Yes. Current Cardiolite abnormality is not severe enough to warrant further testing.   clinically stable. Patient is on optimal medical regimen ( see medication list above )  -      Patient is currently  asymptomatic from CAD.             - changes in  treatment:   no           - Testing ordered:  no  [place] classification: 1  [place] RISK SCORE:  [place] RISK SCORE:  HTN:well controlled on current medical regimen, see list above.              - changes in  treatment:   no   CARDIOMYOPATHY: None known   CONGESTIVE HEART FAILURE: NO KNOWN HISTORY.      VHD: No significant VHD noted  DYSLIPIDEMIA: Patient's profile is at / near LUX [facility] is low                                    Tolerating current medical regimen wellyes,                                                           See most recent Lab values in Labs section above.   Diabetes mellitis: .yes, Diet controlled.                                     Managed by family MD                                     BS under good control yes,                                      Hgb A1c avilable yes,   OTHER RELEVANT DIAGNOSIS:as noted in patient's active problem list:  CVI: Seems patient has C5 venous disease. Patient to wear compression stockings regularly. TESTS ORDERED: Ablation of                                           All previously ordered tests reviewed.   ARRHYTHMIAS:  known but no more C/O Palpitations                                Patient has H/O Atrial fibrillation                                He is rate controlled & on anticoagulation.                               MEDICATIONS: CPM   Office f/u in a month after procedure. Primary/secondary prevention is the goal by aggressive risk modification, healthy and therapeutic life style changes for cardiovascular risk reduction.  Various goals are discussed and multiple questions answered.

## 2021-01-05 ENCOUNTER — TELEPHONE (OUTPATIENT)
Dept: CARDIOLOGY CLINIC | Age: 81
End: 2021-01-05

## 2021-01-06 ENCOUNTER — TELEPHONE (OUTPATIENT)
Dept: CARDIOLOGY CLINIC | Age: 81
End: 2021-01-06

## 2021-01-06 NOTE — TELEPHONE ENCOUNTER
PA request submitted to [employer]. Notification received that no action is required. Patient notified. Patient scheduled at his request on 1/29/21 @10:00.

## 2021-01-26 ENCOUNTER — TELEPHONE (OUTPATIENT)
Dept: CARDIOLOGY CLINIC | Age: 81
End: 2021-01-26

## 2021-01-26 NOTE — TELEPHONE ENCOUNTER
Pre- Instructions Venous ablation     Date of Procedure: 1/29/2021 Time: 10 AM Arrival Time: 945 AM      · Please keep yourself hydrated for 24 hours before the procedure ( drink lots of water)  · Please wear loose comfortable clothing. · Patient does not need to wear compression stockings to the procedure. · You will need someone with you to drive you home. · Please eat a light breakfast in the morning  · Please continue to take medications as needed. Confirmed with patient and reviewed instructions. Patient voiced understanding.

## 2021-01-29 ENCOUNTER — PROCEDURE VISIT (OUTPATIENT)
Dept: CARDIOLOGY CLINIC | Age: 81
End: 2021-01-29
Payer: COMMERCIAL

## 2021-01-29 DIAGNOSIS — I83.93 VARICOSE VEINS OF BOTH LOWER EXTREMITIES, UNSPECIFIED WHETHER COMPLICATED: Primary | ICD-10-CM

## 2021-01-29 DIAGNOSIS — R60.0 LEG EDEMA: ICD-10-CM

## 2021-01-29 DIAGNOSIS — I25.119 CORONARY ARTERY DISEASE INVOLVING NATIVE CORONARY ARTERY OF NATIVE HEART WITH ANGINA PECTORIS (HCC): ICD-10-CM

## 2021-01-29 DIAGNOSIS — E78.00 PURE HYPERCHOLESTEROLEMIA: ICD-10-CM

## 2021-01-29 DIAGNOSIS — I10 ESSENTIAL HYPERTENSION: ICD-10-CM

## 2021-01-29 PROCEDURE — 36482 ENDOVEN THER CHEM ADHES 1ST: CPT | Performed by: INTERNAL MEDICINE

## 2021-01-29 NOTE — PROGRESS NOTES
Endovenous Ablation with VenaSeal Operative Report    1/29/2021    Subjective:  Anton Gilbert is a [de-identified] y.o. male    Procedure Performed:    Endovenous Ablation of the Great Saphenous Vein with VenaSeal Closure System of the  Left side. Indication  Duplex ultrasound was used to map out the insufficient saphenous vein, and access was determined and marked on the overlying skin. The depth and diameter of the vein(s) to be treated was documented. The patient was placed supine on the procedure table and the leg was prepped and draped using sterile technique. Ultrasound guidance was again used to localize the access site. 1% lidocaine was injected as a local anesthetic in the subcutaneous tissues at the target location in the GSV in the lower leg. Using ultrasound guidance, access was gained at this location with the 19 gauge thin walled access needle and followed by introduction of a short guidewire, location confirmed with ultrasound. A small, 3 mm incision was made at the access site to allow for introduction and placement of the 7 Fr x7cm introducer/dilator. The dilator and guidewire were removed. The 0.035 guidewire from the DR [facility]CH[facility] kit was then introduced and positioned at the saphenofemoral junction using ultrasound guidance. The 80 cm 7 Fr introducer sheath/dilator was positioned 5cm from the saphenofemoral junction. The guidewire and dilator were removed, and the remaining sheath was flushed with sterile saline, with the syringe remaining in place prior to the next steps. The cyanoacrylate adhesive was precisely primed into the 5 F delivery catheter and this catheter/syringe combination was attached within the dispenser gun. This \"assembly\" was introduced through the 7 F sheath and positioned 5 cm caudal of the saphenofemoral junction under ultrasound guidance.  The steps from the IFU were followed for dispensing amounts, locations and compression times, e.g 2 aliquots proximally with 3 minutes of compression, and 1 aliquot every 3cm distally with 30 sec of compression along the course of the vessel. Following the last injection and compression sequence, the catheter and introducer sheath were pulled out from the access site. Hemostasis was achieved with manual compression and an adhesive bandage was applied to the incision. Ultrasound confirmed complete coaptation and closure of the treated segments of the GSV, and the absence of any DVT at the saphenofemoral junction. Treatment dose was approximately 2.7 ml and the vein length treated was 74 cm. The drapes were removed and the patient cleaned and prepared for discharge. Post op ultrasound check is scheduled for   48- 72 hours and the patient was given written post-op instructions. Complications: none immediate    Blood Loss: minimal    Conclusion:   Successful Endovenous Ablation of the Great Saphenous Vein with VenaSeal Closure System of the  Left side.       Po Leal MD, [facility]

## 2021-01-29 NOTE — PROGRESS NOTES
VENOUS PRE-PROCEDURE H & P  1/29/2021    Subjective:  Dennis Cheek is a [de-identified] y.o. male    GENERAL - A-Ox3- In no respiratory distress  Heart - Regular rhythm, normal S1, S2, no gallops, no murmurs, no friction rubs  Chest - CTA & percussion    Vein Exam:     Left ext  - varicosities, spider and reticular veins Yes, skin changes Yes, ulcers No, edema Yes    Reflex Study:   No significant reflux noted in the veins of the right lower extremity.    Significant reflux noted in the Left CFV (1.4s) and GSV at the SFJ (1.1s)    and mid calf (0.7s).    No evidence of DVT or SVT in the bilateral common femoral vein, femoral    vein, popliteal vein, greater saphenous vein or small saphenous vein. Assessment:   Patient has symptomatic C4 venous disease of Left lower extremity. Plan:   Ablation of Left GSV. Informed consent obtained.       Margarito Pizarro MD, [facility]

## 2021-02-01 ENCOUNTER — PROCEDURE VISIT (OUTPATIENT)
Dept: CARDIOLOGY CLINIC | Age: 81
End: 2021-02-01
Payer: COMMERCIAL

## 2021-02-01 DIAGNOSIS — I87.2 VENOUS INSUFFICIENCY: Primary | ICD-10-CM

## 2021-02-01 PROCEDURE — 93971 EXTREMITY STUDY: CPT | Performed by: INTERNAL MEDICINE

## 2021-02-04 ENCOUNTER — TELEPHONE (OUTPATIENT)
Dept: CARDIOLOGY CLINIC | Age: 81
End: 2021-02-04

## 2021-02-04 NOTE — TELEPHONE ENCOUNTER
Patient states he has a nickel sized red lump above where the needle went in for ablation. Patient advised to apply heat and take OTC ibuprofen and if no better call back in a day or too. Patient agreed.

## 2021-02-24 ENCOUNTER — OFFICE VISIT (OUTPATIENT)
Dept: FAMILY MEDICINE CLINIC | Age: 81
End: 2021-02-24
Payer: COMMERCIAL

## 2021-02-24 VITALS
SYSTOLIC BLOOD PRESSURE: 120 MMHG | HEIGHT: 70 IN | WEIGHT: 191 LBS | TEMPERATURE: 97 F | BODY MASS INDEX: 27.35 KG/M2 | DIASTOLIC BLOOD PRESSURE: 84 MMHG | HEART RATE: 56 BPM

## 2021-02-24 DIAGNOSIS — E11.9 TYPE 2 DIABETES MELLITUS WITHOUT COMPLICATION, WITHOUT LONG-TERM CURRENT USE OF INSULIN (HCC): ICD-10-CM

## 2021-02-24 DIAGNOSIS — E78.00 PURE HYPERCHOLESTEROLEMIA: ICD-10-CM

## 2021-02-24 DIAGNOSIS — I48.0 PAF (PAROXYSMAL ATRIAL FIBRILLATION) (HCC): ICD-10-CM

## 2021-02-24 DIAGNOSIS — I10 ESSENTIAL HYPERTENSION: ICD-10-CM

## 2021-02-24 DIAGNOSIS — I25.119 CORONARY ARTERY DISEASE INVOLVING NATIVE CORONARY ARTERY OF NATIVE HEART WITH ANGINA PECTORIS (HCC): ICD-10-CM

## 2021-02-24 DIAGNOSIS — E03.9 ACQUIRED HYPOTHYROIDISM: Primary | ICD-10-CM

## 2021-02-24 PROCEDURE — 99214 OFFICE O/P EST MOD 30 MIN: CPT | Performed by: FAMILY MEDICINE

## 2021-02-24 RX ORDER — ATORVASTATIN CALCIUM 20 MG/1
TABLET, FILM COATED ORAL
Qty: 90 TABLET | Refills: 1 | Status: SHIPPED | OUTPATIENT
Start: 2021-02-24 | End: 2021-05-17 | Stop reason: SDUPTHER

## 2021-02-24 NOTE — PROGRESS NOTES
Osteopenia     PAF (paroxysmal atrial fibrillation) (HCC)     Polyp of colon     S/P PTCA (percutaneous transluminal coronary angioplasty) 11/15/2012    stenting of 2 sites in RCA    Type 2 diabetes mellitus ([facility]     Varicose veins of both lower extremities     VHD (valvular heart disease)        FAMILY HISTORY  Family History   Problem Relation Age of Onset    Tuberculosis Mother     Heart Surgery Father     Pacemaker Father     Depression Daughter        SOCIAL HISTORY  Social History     Socioeconomic History    Marital status:      Spouse name: Not on file    Number of children: Not on file    Years of education: Not on file    Highest education level: Not on file   Occupational History    Not on file   Social Needs    Financial resource strain: Not on file    Food insecurity     Worry: Not on file     Inability: Not on file    Transportation needs     Medical: Not on file     Non-medical: Not on file   Tobacco Use    Smoking status: Former Smoker     Packs/day: 1.00     Years: 13.00     Pack years: 13.00     Start date:      Quit date: 10/29/1967     Years since quittin.1    Smokeless tobacco: Never Used   Substance and Sexual Activity    Alcohol use: Not Currently     Alcohol/week: 0.0 standard drinks     Comment: RARE, 2 cups coffee daily    Drug use: No    Sexual activity: Yes     Partners: Female     Comment:    Lifestyle    Physical activity     Days per week: Not on file     Minutes per session: Not on file    Stress: Not on file   Relationships    Social connections     Talks on phone: Not on file     Gets together: Not on file     Attends Christian service: Not on file     Active member of club or organization: Not on file     Attends meetings of clubs or organizations: Not on file     Relationship status: Not on file    Intimate partner violence     Fear of current or ex partner: Not on file     Emotionally abused: Not on file     Physically abused: Not on file     Forced sexual activity: Not on file   Other Topics Concern    Not on file   Social History Narrative    Not on file        SURGICAL HISTORY  Past Surgical History:   Procedure Laterality Date    ANUS SURGERY      anal fistula-70's    CHOLECYSTECTOMY  2009    PTCA  11/2012    TONSILLECTOMY AND ADENOIDECTOMY         CURRENT MEDICATIONS  Current Outpatient Medications   Medication Sig Dispense Refill    atorvastatin (LIPITOR) 20 MG tablet TAKE ONE TABLET BY MOUTH DAILY 90 tablet 1    amLODIPine (NORVASC) 5 MG tablet Take 1 tablet by mouth daily 30 tablet 5    dilTIAZem (CARDIZEM 12 HR) 120 MG extended release capsule Take 1 capsule by mouth every 12 hours as needed (palpitations and elevated HR. Please check blood pressure at home before taking medication, do not take if systolic pressure (upper number) is below 100) 10 capsule 3    Blood Pressure KIT 1 Device by Does not apply route daily as needed (Blood pressure evaluation) 1 kit 0    levothyroxine (SYNTHROID) 75 MCG tablet Take 1 tablet by mouth daily 30 tablet 5    finasteride (PROSCAR) 5 MG tablet Take 5 mg by mouth daily      memantine (NAMENDA) 5 MG tablet       rivaroxaban (XARELTO) 20 MG TABS tablet Take 1 tablet by mouth every 24 hours 90 tablet 3    alfuzosin (UROXATRAL) 10 MG SR tablet Take 10 mg by mouth daily      aspirin 81 MG tablet Take 81 mg by mouth daily.  losartan (COZAAR) 100 MG tablet Take 1 tablet by mouth daily 90 tablet 1     No current facility-administered medications for this visit. ALLERGIES  Allergies   Allergen Reactions    Lisinopril Other (See Comments)     Ace cough.  Pcn [Penicillins] Rash       PHYSICAL EXAM    /84   Pulse 56   Temp 97 °F (36.1 °C)   Ht 5' 10\" (1.778 m)   Wt 191 lb (86.6 kg)   BMI 27.41 kg/m²     ASSESSMENT & PLAN    1. Acquired hypothyroidism  [facility]. Currently at goal.  Remain on the same.     2. Coronary artery disease involving native coronary artery of native heart with angina pectoris ([facility]  Issue controlled. Continue meds. Refilled meds. Refill Lipitor 20 mg 1 daily #90 refill 1    3. Type 2 diabetes mellitus without complication, without long-term current use of insulin ([facility])  Reviewed A1c. Remains at 6.4. Al is less than 2 months ago. Good control. Remain on the same. 4. Pure hypercholesterolemia  Reviewed [facility] labs. LDL at goal.  Remain on the same. 5. PAF (paroxysmal atrial fibrillation) ([facility])  Better rate control now with the as needed diltiazem. Continue that when needed and Xarelto daily    6. Essential hypertension  Issue controlled. Continue meds. Refilled meds.     Follow-up 6 months    Electronically signed by Sadiq Jacobs MD on 2/24/2021

## 2021-03-03 ENCOUNTER — PROCEDURE VISIT (OUTPATIENT)
Dept: CARDIOLOGY CLINIC | Age: 81
End: 2021-03-03

## 2021-03-03 DIAGNOSIS — I87.2 VENOUS INSUFFICIENCY: Primary | ICD-10-CM

## 2021-03-05 ENCOUNTER — TELEPHONE (OUTPATIENT)
Dept: CARDIOLOGY CLINIC | Age: 81
End: 2021-03-05

## 2021-03-05 NOTE — TELEPHONE ENCOUNTER
Left CFV is patent with good compressibility and respirophasic signal with    good augmentation.    The Left GSV is non-compressible with no evidence of flow just past the    saphenofemoral junction to the distal calf.      Left message

## 2021-03-15 DIAGNOSIS — I10 ESSENTIAL HYPERTENSION: ICD-10-CM

## 2021-03-15 RX ORDER — LOSARTAN POTASSIUM 100 MG/1
TABLET ORAL
Qty: 90 TABLET | Refills: 1 | Status: SHIPPED | OUTPATIENT
Start: 2021-03-15 | End: 2021-08-25 | Stop reason: SDUPTHER

## 2021-04-29 ENCOUNTER — OFFICE VISIT (OUTPATIENT)
Dept: CARDIOLOGY CLINIC | Age: 81
End: 2021-04-29
Payer: MEDICARE

## 2021-04-29 VITALS
SYSTOLIC BLOOD PRESSURE: 120 MMHG | HEIGHT: 71 IN | BODY MASS INDEX: 26.91 KG/M2 | HEART RATE: 78 BPM | DIASTOLIC BLOOD PRESSURE: 70 MMHG | WEIGHT: 192.2 LBS

## 2021-04-29 DIAGNOSIS — I10 ESSENTIAL HYPERTENSION: ICD-10-CM

## 2021-04-29 DIAGNOSIS — E78.00 PURE HYPERCHOLESTEROLEMIA: ICD-10-CM

## 2021-04-29 DIAGNOSIS — E11.9 TYPE 2 DIABETES MELLITUS WITHOUT COMPLICATION, WITHOUT LONG-TERM CURRENT USE OF INSULIN (HCC): ICD-10-CM

## 2021-04-29 DIAGNOSIS — I25.5 ISCHEMIC CARDIOMYOPATHY: ICD-10-CM

## 2021-04-29 DIAGNOSIS — R60.0 LEG EDEMA: ICD-10-CM

## 2021-04-29 DIAGNOSIS — I38 VHD (VALVULAR HEART DISEASE): ICD-10-CM

## 2021-04-29 DIAGNOSIS — I83.93 VARICOSE VEINS OF BOTH LOWER EXTREMITIES, UNSPECIFIED WHETHER COMPLICATED: ICD-10-CM

## 2021-04-29 DIAGNOSIS — I25.119 CORONARY ARTERY DISEASE INVOLVING NATIVE CORONARY ARTERY OF NATIVE HEART WITH ANGINA PECTORIS (HCC): Primary | ICD-10-CM

## 2021-04-29 DIAGNOSIS — I48.0 PAF (PAROXYSMAL ATRIAL FIBRILLATION) (HCC): ICD-10-CM

## 2021-04-29 PROCEDURE — 99214 OFFICE O/P EST MOD 30 MIN: CPT | Performed by: INTERNAL MEDICINE

## 2021-04-29 NOTE — PATIENT INSTRUCTIONS
Please be informed that if you contact our office outside of normal business hours the physician on call cannot help with any scheduling or rescheduling issues, procedure instruction questions or any type of medication issue. We advise you for any urgent/emergency that you go to the nearest emergency room! PLEASE CALL OUR OFFICE DURING NORMAL BUSINESS HOURS    Monday - Friday   8 am to 5 pm    [place]: [phone]    Corina Mayorga: [phone]    [place]:  [phone]    CAD:Yes.   S/P PTCA (percutaneous transluminal coronary angioplasty) 11/15/2012     stenting of 2 sites in RCA   Recent Cardiolite abnormality is not severe enough to warrant further testing.   clinically stable. Patient is on optimal medical regimen ( see medication list above )  -      Patient is currently  asymptomatic from CAD.             - changes in  treatment:   no           - Testing ordered:  no  Citizen of [place] classification: 1  [place] RISK SCORE:  ROSALIE RISK SCORE:  HTN:well controlled on current medical regimen, see list above.              - changes in  treatment:   no   CARDIOMYOPATHY: None known   CONGESTIVE HEART FAILURE: NO KNOWN HISTORY.      VHD: No significant VHD noted  DYSLIPIDEMIA: Patient's profile is at / near [facility] is low                                    Tolerating current medical regimen wellyes,                                                           See most recent Lab values in Labs section above.   Diabetes mellitis: .yes, Diet controlled.                                     Managed by family MD                                     BS under good control yes,                                      Hgb A1c avilable yes,   OTHER RELEVANT DIAGNOSIS:as noted in patient's active problem list:  CVI: S/P Left GSV ablation.   TESTS ORDERED: Ablation of                                           All previously ordered tests reviewed.   ARRHYTHMIAS:  known but no more C/O Palpitations                                Patient has H/O Atrial fibrillation                                He is rate controlled & on anticoagulation.                               MEDICATIONS: CPM. Patient is considered a medium risk candidate for his Knee surgery. Office f/u in six months.

## 2021-04-29 NOTE — LETTER
[address]  [address] [address]  Phone: [phone]  Fax: [phone]    Yesica Rodriguez MD        April 29, 2021     Camilo Lopez MD  [address]    Patient: Jonny Toth  MR Number: [id number]  YOB: 1940  Date of Visit: 4/29/2021    Dear Dr. Camilo Lopez: Thank you for the request for consultation for Carol Jacobs to me for the evaluation of CAD. Below are the relevant portions of my assessment and plan of care. If you have questions, please do not hesitate to call me. I look forward to following Jose Alejandro Faust along with you.     Sincerely,        Yesica Rodriguez MD

## 2021-04-29 NOTE — PROGRESS NOTES
by mouth every 24 hours 90 tablet 3    alfuzosin (UROXATRAL) 10 MG SR tablet Take 10 mg by mouth daily      aspirin 81 MG tablet Take 81 mg by mouth daily. No current facility-administered medications for this visit. Allergies: Lisinopril and Pcn [penicillins]  Review of Systems:    Constitutional: Negative for diaphoresis and fatigue  Respiratory: Negative for shortness of breath  Cardiovascular: Negative for chest pain, dyspnea on exertion, claudication, edema, irregular heartbeat, murmur, palpitations or shortness of breath  Musculoskeletal: Negative for muscle pain, muscular weakness, negative for pain in arm and leg or swelling in foot and leg    Objective: There were no vitals taken for this visit. Wt Readings from Last 3 Encounters:   02/24/21 191 lb (86.6 kg)   01/04/21 194 lb 3.2 oz (88.1 kg)   10/28/20 196 lb (88.9 kg)     There is no height or weight on file to calculate BMI. GENERAL - Alert, oriented, pleasant, in no apparent distress. EYES: No jaundice, no conjunctival pallor. Neck - Supple. No jugular venous distention noted. No carotid bruits. Cardiovascular - Normal S1 and S2 without obvious murmur or gallop. Extremities - No cyanosis, clubbing, or significant edema. Pulmonary - No respiratory distress. No wheezes or rales.       Lab Review   Lab Results   Component Value Date    TROPONINT <0.010 10/10/2020    TROPONINT <0.010 01/10/2016     Lab Results   Component Value Date    PROBNP 393.0 10/10/2020    PROBNP 94.77 07/09/2014     Lab Results   Component Value Date    INR 1.13 11/12/2012     Lab Results   Component Value Date    LABA1C 6.4 12/08/2020    LABA1C 7.0 (H) 08/25/2020     Lab Results   Component Value Date    WBC 4.9 10/10/2020    WBC 5.5 08/25/2020    HCT 44.2 10/10/2020    HCT 41.0 (L) 08/25/2020    MCV 93.1 10/10/2020    MCV 94.9 08/25/2020     10/10/2020     08/25/2020     Lab Results   Component Value Date    CHOL 98 12/08/2020    CHOL 117 08/25/2020    TRIG 157 12/08/2020    TRIG 239 (H) 08/25/2020    HDL 28 (A) 12/08/2020    HDL 24 (L) 08/25/2020    LDLCALC 39 12/08/2020    LDLCALC 58 06/24/2019    LDLDIRECT 53 08/25/2020    LDLDIRECT 75 05/13/2013     Lab Results   Component Value Date    ALT 25 12/08/2020    ALT 15 10/10/2020    AST 15 12/08/2020    AST 15 10/10/2020     BMP:    Lab Results   Component Value Date     12/08/2020     10/10/2020    K 3.9 12/08/2020    K 4.2 10/10/2020     12/08/2020     10/10/2020    CO2 23 12/08/2020    CO2 23 10/10/2020    BUN 19 12/08/2020    BUN 17 10/10/2020    CREATININE 1.1 12/08/2020    CREATININE 0.9 10/10/2020     CMP:   Lab Results   Component Value Date     12/08/2020     10/10/2020    K 3.9 12/08/2020    K 4.2 10/10/2020     12/08/2020     10/10/2020    CO2 23 12/08/2020    CO2 23 10/10/2020    BUN 19 12/08/2020    BUN 17 10/10/2020    CREATININE 1.1 12/08/2020    CREATININE 0.9 10/10/2020    PROT 6.8 10/10/2020    PROT 6.5 08/25/2020    PROT 6.9 12/09/2013     Lab Results   Component Value Date    TSH 3.930 12/08/2020    TSH 3.70 06/24/2019    TSHHS 3.180 10/10/2020    TSHHS 12.770 08/25/2020     CARDIOLITE 10/2020    abnormal stress test, mildly depressed LVEF, Decreased uptake inferiorly due    to diaphragmatic artifact. Myocardial perfusion scan shows small size,    moderate intensity, partially reversible perfusion defect in inferior wall. ECHO 8/2018   Left ventricular systolic function is normal with an ejection fraction of   55-60%. Mild concentric left ventricular hypertrophy. The left atrium is mildly dilated. No significant valvular disease or diastolic dysfunction noted. No evidence of pericardial effusion. QUALITY MEASURES REVIEWED:  1.CAD:Patient is taking anti platelet agent:Yes  Patient does not have Hx of documented CAD  2. DYSLIPIDEMIA: Patient is on cholesterol lowering medication:Yes   3. Beta-Blocker therapy for CAD, if Atrial fibrillation                                He is rate controlled & on anticoagulation.                               MEDICATIONS: CPM. Patient is considered a medium risk candidate for his Knee surgery. Office f/u in six months.

## 2021-04-29 NOTE — LETTER
Devota Frankel Dr. Burlene Palms  1940  [id number]    Have you had any Chest Pain that is not new? - No          Have you had any Shortness of Breath - No      Have you had any dizziness - No       Have you had any palpitations that are not new? - No      Is the patient on any of the following medications -   If Yes DO EKG - Needs done every 6 months    Do you have any edema - swelling in No        When did you have your last labs drawn   Where did you have them done   What doctor ordered     If we do not have these labs you are retrieve these labs for these providers! Do you have a surgery or procedure scheduled in the near future - Yes  If Yes- DO EKG  If Yes - Who is the surgery with?  Dr. Michelle Bueno   Phone number of physician   Fax number of physician   Type of surgery knee replacement   GIVE THIS INFORMATION TO RENZO ONEIL     Ask patient if they [facility] to [facility] if they are not already signed [facility]    [facility] to see if we have an E-MAIL on file for the patient     Check medication list thoroughly!!! AND RECONCILE OUTSIDE MEDICATIONS  If dose has changed change the entire order not just the [place] At check out add to every patient's \"wrap up\" the following dot phrase AFTERHOURSEDUCATION and ensure we explain this to our patients

## 2021-05-14 RX ORDER — LEVOTHYROXINE SODIUM 0.07 MG/1
TABLET ORAL
Qty: 30 TABLET | Refills: 5 | Status: SHIPPED | OUTPATIENT
Start: 2021-05-14 | End: 2021-05-18

## 2021-05-17 DIAGNOSIS — I10 ESSENTIAL HYPERTENSION: ICD-10-CM

## 2021-05-17 RX ORDER — ATORVASTATIN CALCIUM 20 MG/1
TABLET, FILM COATED ORAL
Qty: 90 TABLET | Refills: 0 | Status: SHIPPED | OUTPATIENT
Start: 2021-05-17 | End: 2021-08-25 | Stop reason: SDUPTHER

## 2021-05-17 NOTE — TELEPHONE ENCOUNTER
Requested Prescriptions     Signed Prescriptions Disp Refills    atorvastatin (LIPITOR) 20 MG tablet 90 tablet 0     Sig: TAKE ONE TABLET BY MOUTH DAILY     Authorizing Provider: Brady Dye     Ordering User: Douglas Mcleod

## 2021-05-18 RX ORDER — LEVOTHYROXINE SODIUM 0.07 MG/1
TABLET ORAL
Qty: 90 TABLET | Refills: 0 | Status: SHIPPED | OUTPATIENT
Start: 2021-05-18 | End: 2022-01-05 | Stop reason: SDUPTHER

## 2021-05-24 ENCOUNTER — TELEPHONE (OUTPATIENT)
Dept: CARDIOLOGY CLINIC | Age: 81
End: 2021-05-24

## 2021-05-24 NOTE — TELEPHONE ENCOUNTER
Cardiologist: Dr. Zach Carpio  Surgeon: Dr. Charles Thapa  Surgery: Right Partial knee replacement  Anesthesia: General- per Yaakov Dumont. Date: 6/10/2021  FAX# [phone]  # [phone]    Last OV 4/29/2021 w/Wiley      CAD:Yes.   S/P PTCA (percutaneous transluminal coronary angioplasty) 11/15/2012     stenting of 2 sites in RCA   Recent Cardiolite abnormality is not severe enough to warrant further testing.   clinically stable. Patient is on optimal medical regimen ( see medication list above )  -      Patient is currently  asymptomatic from CAD.          - changes in  treatment:   no           - Testing ordered:  no  [place] classification: 1  [place] RISK SCORE:  ROSALIE RISK SCORE:  HTN:well controlled on current medical regimen, see list above.              - changes in  treatment:   no   CARDIOMYOPATHY: None known   CONGESTIVE HEART FAILURE: NO KNOWN HISTORY.      VHD: No significant VHD noted  DYSLIPIDEMIA: Patient's profile is at / near [facility] is low                                    Tolerating current medical regimen wellyes,                                                           See most recent Lab values in Labs section above.   Diabetes mellitis: .yes, Diet controlled.                                     Managed by family MD                                     BS under good control yes,                                      Hgb A1c avilable yes,   OTHER RELEVANT DIAGNOSIS:as noted in patient's active problem list:  CVI: S/P Left GSV ablation.   TESTS ORDERED: Ablation of                                           All previously ordered tests reviewed.   ARRHYTHMIAS:  known but no more C/O Palpitations                                Patient has H/O Atrial fibrillation                                He is rate controlled & on anticoagulation.                               MEDICATIONS: CPM. Patient is considered a medium risk candidate for his Knee surgery.    Office f/u in six months. NM-10/16/2020   abnormal stress test, mildly depressed LVEF, Decreased uptake inferiorly due    to diaphragmatic artifact. Myocardial perfusion scan shows small size,    moderate intensity, partially reversible perfusion defect in inferior wall.             Echo- 8/4/2018   Left ventricular systolic function is normal with an ejection fraction of   55-60%. Mild concentric left ventricular hypertrophy. The left atrium is mildly dilated. No significant valvular disease or diastolic dysfunction noted. No evidence of pericardial effusion.     PTCA- 11/2012      Xarelto    Aspirin

## 2021-05-25 ENCOUNTER — OFFICE VISIT (OUTPATIENT)
Dept: FAMILY MEDICINE CLINIC | Age: 81
End: 2021-05-25
Payer: MEDICARE

## 2021-05-25 VITALS
BODY MASS INDEX: 26.54 KG/M2 | WEIGHT: 189.6 LBS | OXYGEN SATURATION: 96 % | HEIGHT: 71 IN | DIASTOLIC BLOOD PRESSURE: 62 MMHG | HEART RATE: 61 BPM | SYSTOLIC BLOOD PRESSURE: 115 MMHG

## 2021-05-25 DIAGNOSIS — M15.9 PRIMARY OSTEOARTHRITIS INVOLVING MULTIPLE JOINTS: ICD-10-CM

## 2021-05-25 DIAGNOSIS — M54.50 ACUTE LEFT-SIDED LOW BACK PAIN WITHOUT SCIATICA: Primary | ICD-10-CM

## 2021-05-25 LAB
BILIRUBIN, POC: NORMAL
BLOOD URINE, POC: NORMAL
CLARITY, POC: CLEAR
COLOR, POC: YELLOW
GLUCOSE URINE, POC: NORMAL
KETONES, POC: NORMAL
LEUKOCYTE EST, POC: NORMAL
NITRITE, POC: NORMAL
PH, POC: 5.5
PROTEIN, POC: NORMAL
SPECIFIC GRAVITY, POC: 1.02
UROBILINOGEN, POC: 0.2

## 2021-05-25 PROCEDURE — 99213 OFFICE O/P EST LOW 20 MIN: CPT | Performed by: STUDENT IN AN ORGANIZED HEALTH CARE EDUCATION/TRAINING PROGRAM

## 2021-05-25 PROCEDURE — 81002 URINALYSIS NONAUTO W/O SCOPE: CPT | Performed by: STUDENT IN AN ORGANIZED HEALTH CARE EDUCATION/TRAINING PROGRAM

## 2021-05-25 RX ORDER — TIZANIDINE 4 MG/1
4 TABLET ORAL 3 TIMES DAILY PRN
Qty: 30 TABLET | Refills: 0 | Status: SHIPPED | OUTPATIENT
Start: 2021-05-25 | End: 2021-06-08

## 2021-05-25 ASSESSMENT — ENCOUNTER SYMPTOMS
NAUSEA: 0
SORE THROAT: 0
SHORTNESS OF BREATH: 0
BACK PAIN: 1
WHEEZING: 0
ABDOMINAL PAIN: 0

## 2021-05-25 NOTE — PROGRESS NOTES
6/7/2021    Edu Wade    Chief Complaint   Patient presents with    Back Pain     pt states that his back hurts around the middle of his back and hurts worse at night and keeps him awake . Pt states that the pain started last sat . He  said he was doing yard work but does not know what he might have done . He has used heat and ice and that does seem to help .  Other     pt states that he is going to have a partial knee replacement in 3 weeks oon rt knee in [facility]        HPI  History was obtained from patient. Zach Garcia is a [de-identified] y.o. male with a PMHx as listed below who presents today for left sided low back pain ongoing for around 4 days now. Worse at night    Originally noticed after working out in the yard Saturday afternoon, in the evening pain started. Mild improvement with pain medications. No changes in bowel or bladder movements. No blood in stools or urine. Patient has plan for partial right knee replacement in three weeks. No history of kidney stones    1. Acute left-sided low back pain without sciatica    2. Primary osteoarthritis involving multiple joints             REVIEW OF SYMPTOMS    Review of Systems   Constitutional: Negative for chills and fatigue. HENT: Negative for congestion and sore throat. Respiratory: Negative for shortness of breath and wheezing. Cardiovascular: Negative for chest pain and palpitations. Gastrointestinal: Negative for abdominal pain and nausea. Genitourinary: Negative for frequency and urgency. Musculoskeletal: Positive for arthralgias, back pain and gait problem. Neurological: Negative for light-headedness. PAST MEDICAL HISTORY  Past Medical History:   Diagnosis Date    Cardiomyopathy [facility])     Depressive disorder     major    Essential hypertension     Fall 10/2013    Was walking and missed his step and fell.     H/O cardiovascular stress test 07/08/2016    EF 53% Normal study    H/O echocardiogram 03/11/2013    EF =>55%, abnormal LV diastolic function Stage 1, mild mitral and triuspid regurg,normal LV systolic function, no pericarial effusion.  H/O exercise stress test 2013    EXERCISE STRESS-negative stress test    History of echocardiogram 2018    Left ventricular systolic function is normal with an ejection fraction of 55-60%. Mild concentric left ventricular hypertrophy. The left atrium is mildly dilated. No significant valvular disease or diastolic dysfunction noted. No evidence of pericardial effusion.     History of Holter monitoring 14    1 episode of a-fib x 11 hours and 58 minutes    Hx of Venous Doppler ultrasound 2020    No significant reflux in RLE, Significant reflux in LGSV, No DVT or SVT    Impotence     Osteopenia     PAF (paroxysmal atrial fibrillation) (HCC)     Polyp of colon     S/P PTCA (percutaneous transluminal coronary angioplasty) 11/15/2012    stenting of 2 sites in RCA    Type 2 diabetes mellitus ([facility] [address]     Varicose veins of both lower extremities     VHD (valvular heart disease)        FAMILY HISTORY  Family History   Problem Relation Age of Onset    Tuberculosis Mother     Heart Surgery Father     Pacemaker Father     Depression Daughter        SOCIAL HISTORY  Social History     Socioeconomic History    Marital status:      Spouse name: None    Number of children: None    Years of education: None    Highest education level: None   Occupational History    None   Tobacco Use    Smoking status: Former Smoker     Packs/day: 1.00     Years: 13.00     Pack years: 13.00     Start date:      Quit date: 10/29/1967     Years since quittin.6    Smokeless tobacco: Never Used   Vaping Use    Vaping Use: Never used   Substance and Sexual Activity    Alcohol use: Not Currently     Alcohol/week: 0.0 standard drinks     Comment: RARE, 2 cups coffee daily    Drug use: No    Sexual activity: Yes     Partners: Female     Comment:    Other Topics Concern  None   Social History Narrative    None     Social Determinants of Health     Financial Resource Strain:     Difficulty of Paying Living Expenses:    Food Insecurity:     Worried About Running Out of Food in the Last Year:     [address] in the Last Year:    Transportation Needs:     Lack of Transportation (Medical):  Lack of Transportation (Non-Medical):    Physical Activity:     Days of Exercise per Week:     Minutes of Exercise per Session:    Stress:     Feeling of Stress :    Social Connections:     Frequency of Communication with Friends and Family:     Frequency of Social Gatherings with Friends and Family:     Attends Protestant Services:     Active Member of Clubs or Organizations:     Attends Club or Organization Meetings:     Marital Status:    Intimate Partner Violence:     Fear of Current or Ex-Partner:     Emotionally Abused:     Physically Abused:     Sexually Abused:         SURGICAL HISTORY  Past Surgical History:   Procedure Laterality Date    ANUS SURGERY      anal fistula-70's    CHOLECYSTECTOMY  2009    PTCA  11/2012    TONSILLECTOMY AND ADENOIDECTOMY                   CURRENT MEDICATIONS  Current Outpatient Medications   Medication Sig Dispense Refill    tiZANidine (ZANAFLEX) 4 MG tablet Take 1 tablet by mouth 3 times daily as needed (pain) 30 tablet 0    levothyroxine (SYNTHROID) 75 MCG tablet TAKE ONE TABLET BY MOUTH DAILY 90 tablet 0    atorvastatin (LIPITOR) 20 MG tablet TAKE ONE TABLET BY MOUTH DAILY 90 tablet 0    losartan (COZAAR) 100 MG tablet TAKE ONE TABLET BY MOUTH DAILY 90 tablet 1    amLODIPine (NORVASC) 5 MG tablet Take 1 tablet by mouth daily 30 tablet 5    dilTIAZem (CARDIZEM 12 HR) 120 MG extended release capsule Take 1 capsule by mouth every 12 hours as needed (palpitations and elevated HR.   Please check blood pressure at home before taking medication, do not take if systolic pressure (upper number) is below 100) 10 capsule 3    Blood Pressure KIT 1 Device by Does not apply route daily as needed (Blood pressure evaluation) 1 kit 0    finasteride (PROSCAR) 5 MG tablet Take 5 mg by mouth daily      memantine (NAMENDA) 5 MG tablet       rivaroxaban (XARELTO) 20 MG TABS tablet Take 1 tablet by mouth every 24 hours 90 tablet 3    alfuzosin (UROXATRAL) 10 MG SR tablet Take 10 mg by mouth daily      aspirin 81 MG tablet Take 81 mg by mouth daily. No current facility-administered medications for this visit. ALLERGIES  Allergies   Allergen Reactions    Lisinopril Other (See Comments)     Ace cough.  Pcn [Penicillins] Rash       PHYSICAL EXAM    /62   Pulse 61   Ht 5' 11\" (1.803 m)   Wt 189 lb 9.6 oz (86 kg)   SpO2 96%   BMI 26.44 kg/m²     Physical Exam  Constitutional:       Appearance: Normal appearance. HENT:      Head: Normocephalic and atraumatic. Eyes:      Extraocular Movements: Extraocular movements intact. Pupils: Pupils are equal, round, and reactive to light. Cardiovascular:      Rate and Rhythm: Normal rate and regular rhythm. Pulses: Normal pulses. Heart sounds: No murmur heard. No friction rub. No gallop. Pulmonary:      Effort: No respiratory distress. Breath sounds: Normal breath sounds. Abdominal:      General: Abdomen is flat. Bowel sounds are normal. There is no distension. Palpations: Abdomen is soft. Tenderness: There is no abdominal tenderness. Musculoskeletal:         General: No swelling or deformity. Cervical back: Neck supple. Comments: decresed ROM thoracic spine 2/2 pain  5/5 m. Strength UE/LE   Skin:     General: Skin is warm and dry. Neurological:      General: No focal deficit present. Mental Status: He is alert. Psychiatric:         Mood and Affect: Mood normal.         Behavior: Behavior normal.         ASSESSMENT & PLAN    1.  Acute left-sided low back pain without sciatica  -u/a negative, start zanaflex,   -avoid nsaids on xarelto  - POCT Urinalysis no Micro  - tiZANidine (ZANAFLEX) 4 MG tablet; Take 1 tablet by mouth 3 times daily as needed (pain)  Dispense: 30 tablet; Refill: 0    2. Primary osteoarthritis involving multiple joints  -plan to follow with Orthopedics      Return for as scheduled.            Electronically signed by Shamir Linares DO on 6/7/2021

## 2021-05-27 ENCOUNTER — TELEPHONE (OUTPATIENT)
Dept: CARDIOLOGY CLINIC | Age: 81
End: 2021-05-27

## 2021-05-27 NOTE — TELEPHONE ENCOUNTER
Santhosh Gonzalez called looking for michele clearance,[facility] 6/10/21 fax to [phone]  Dr Shook Knife

## 2021-06-11 ENCOUNTER — TELEPHONE (OUTPATIENT)
Dept: CARDIOLOGY CLINIC | Age: 81
End: 2021-06-11

## 2021-06-11 NOTE — TELEPHONE ENCOUNTER
Patient called back and stated that he wanted to also inform Dr. Sabina Alas that when he had surgery yesterday in [place] that they cut his Xarelto back to 10mg once a day for 5 days and has him still taking his aspirin as well. Patient had asked if we heard back yet about his previous question regarding his celebrex yet, I explained to him that there is a note in for Dr. Tracy Kumar, and that Dr. Sabina Alas was in procedures this morning. I explained as soon as he is able to answer question, that he would be getting a return call. Patient voiced understanding.

## 2021-06-11 NOTE — TELEPHONE ENCOUNTER
Patient called he just had knee surgery  And was placed on Celebrex and   Was advised to check with Dr Mortimer Plough  That its ok to take

## 2021-07-29 ENCOUNTER — PROCEDURE VISIT (OUTPATIENT)
Dept: CARDIOLOGY CLINIC | Age: 81
End: 2021-07-29
Payer: MEDICARE

## 2021-07-29 DIAGNOSIS — I87.302 CHRONIC VENOUS HYPERTENSION INVOLVING LEFT SIDE: Primary | ICD-10-CM

## 2021-07-29 PROCEDURE — 93971 EXTREMITY STUDY: CPT | Performed by: INTERNAL MEDICINE

## 2021-08-03 ENCOUNTER — TELEPHONE (OUTPATIENT)
Dept: CARDIOLOGY CLINIC | Age: 81
End: 2021-08-03

## 2021-08-03 RX ORDER — AMLODIPINE BESYLATE 5 MG/1
TABLET ORAL
Qty: 30 TABLET | Refills: 5 | Status: SHIPPED | OUTPATIENT
Start: 2021-08-03 | End: 2022-01-31

## 2021-08-03 NOTE — TELEPHONE ENCOUNTER
I talked to DR Ayaz Du. He was wanting to know why patient was on Cardizem and Norvasc. After reviewing chart and speaking to patient, he only ha 10 tablets and barely takes that medication. I called DR. Jama to explain so he could send his Norvasc refill

## 2021-08-03 NOTE — TELEPHONE ENCOUNTER
Leg doppler  The Left GSV is non-compressible with no evidence of flow just past the    saphenofemoral junction to the distal calf.    Left CFV is patent with good compressibility and respirophasic signal with    good augmentation

## 2021-08-25 ENCOUNTER — VIRTUAL VISIT (OUTPATIENT)
Dept: FAMILY MEDICINE CLINIC | Age: 81
End: 2021-08-25
Payer: MEDICARE

## 2021-08-25 ENCOUNTER — OFFICE VISIT (OUTPATIENT)
Dept: FAMILY MEDICINE CLINIC | Age: 81
End: 2021-08-25
Payer: MEDICARE

## 2021-08-25 VITALS
HEIGHT: 71 IN | BODY MASS INDEX: 26.04 KG/M2 | OXYGEN SATURATION: 99 % | DIASTOLIC BLOOD PRESSURE: 86 MMHG | SYSTOLIC BLOOD PRESSURE: 128 MMHG | HEART RATE: 47 BPM | WEIGHT: 186 LBS

## 2021-08-25 DIAGNOSIS — E11.9 TYPE 2 DIABETES MELLITUS WITHOUT COMPLICATION, WITHOUT LONG-TERM CURRENT USE OF INSULIN (HCC): ICD-10-CM

## 2021-08-25 DIAGNOSIS — E03.9 ACQUIRED HYPOTHYROIDISM: ICD-10-CM

## 2021-08-25 DIAGNOSIS — Z00.00 ROUTINE GENERAL MEDICAL EXAMINATION AT A HEALTH CARE FACILITY: Primary | ICD-10-CM

## 2021-08-25 DIAGNOSIS — I42.9 CARDIOMYOPATHY, UNSPECIFIED TYPE (HCC): Primary | ICD-10-CM

## 2021-08-25 DIAGNOSIS — I48.0 PAF (PAROXYSMAL ATRIAL FIBRILLATION) (HCC): ICD-10-CM

## 2021-08-25 DIAGNOSIS — I10 ESSENTIAL HYPERTENSION: ICD-10-CM

## 2021-08-25 PROCEDURE — 99214 OFFICE O/P EST MOD 30 MIN: CPT | Performed by: FAMILY MEDICINE

## 2021-08-25 PROCEDURE — G0438 PPPS, INITIAL VISIT: HCPCS | Performed by: FAMILY MEDICINE

## 2021-08-25 PROCEDURE — 3288F FALL RISK ASSESSMENT DOCD: CPT | Performed by: FAMILY MEDICINE

## 2021-08-25 RX ORDER — ATORVASTATIN CALCIUM 20 MG/1
TABLET, FILM COATED ORAL
Qty: 90 TABLET | Refills: 1 | Status: SHIPPED | OUTPATIENT
Start: 2021-08-25 | End: 2022-01-05 | Stop reason: SDUPTHER

## 2021-08-25 RX ORDER — DILTIAZEM HYDROCHLORIDE 120 MG/1
120 CAPSULE, EXTENDED RELEASE ORAL EVERY 12 HOURS PRN
Qty: 180 CAPSULE | Refills: 1 | Status: CANCELLED | OUTPATIENT
Start: 2021-08-25

## 2021-08-25 RX ORDER — LOSARTAN POTASSIUM 100 MG/1
TABLET ORAL
Qty: 90 TABLET | Refills: 1 | Status: SHIPPED | OUTPATIENT
Start: 2021-08-25 | End: 2022-01-05 | Stop reason: SDUPTHER

## 2021-08-25 SDOH — ECONOMIC STABILITY: FOOD INSECURITY: WITHIN THE PAST 12 MONTHS, YOU WORRIED THAT YOUR FOOD WOULD RUN OUT BEFORE YOU GOT MONEY TO BUY MORE.: NEVER TRUE

## 2021-08-25 SDOH — ECONOMIC STABILITY: FOOD INSECURITY: WITHIN THE PAST 12 MONTHS, THE FOOD YOU BOUGHT JUST DIDN'T LAST AND YOU DIDN'T HAVE MONEY TO GET MORE.: NEVER TRUE

## 2021-08-25 ASSESSMENT — SOCIAL DETERMINANTS OF HEALTH (SDOH): HOW HARD IS IT FOR YOU TO PAY FOR THE VERY BASICS LIKE FOOD, HOUSING, MEDICAL CARE, AND HEATING?: NOT HARD AT ALL

## 2021-08-25 ASSESSMENT — PATIENT HEALTH QUESTIONNAIRE - PHQ9
SUM OF ALL RESPONSES TO PHQ9 QUESTIONS 1 & 2: 0
SUM OF ALL RESPONSES TO PHQ QUESTIONS 1-9: 0
1. LITTLE INTEREST OR PLEASURE IN DOING THINGS: 0
2. FEELING DOWN, DEPRESSED OR HOPELESS: 0
SUM OF ALL RESPONSES TO PHQ QUESTIONS 1-9: 0
SUM OF ALL RESPONSES TO PHQ QUESTIONS 1-9: 0

## 2021-08-25 ASSESSMENT — LIFESTYLE VARIABLES: HOW OFTEN DO YOU HAVE A DRINK CONTAINING ALCOHOL: 0

## 2021-08-25 NOTE — PROGRESS NOTES
Medicare Annual Wellness Visit  Name: Aida Smith Date: 2021   MRN: [id number] Sex: Male   Age: [de-identified] y.o. Ethnicity: Non- / Non    : 1940 Race: White (non-)      Shiela Altamirano is here for Medicare AWV    Screenings for behavioral, psychosocial and functional/safety risks, and cognitive dysfunction are all negative except as indicated below. These results, as well as other patient data from the [address] form, are documented in Flowsheets linked to this Encounter. Allergies   Allergen Reactions    Lisinopril Other (See Comments)     Ace cough.  Pcn [Penicillins] Rash       Prior to Visit Medications    Medication Sig Taking? Authorizing Provider   atorvastatin (LIPITOR) 20 MG tablet TAKE ONE TABLET BY MOUTH DAILY Yes Robbin Headley MD   losartan (COZAAR) 100 MG tablet TAKE ONE TABLET BY MOUTH DAILY Yes Robbin Headley MD   amLODIPine (NORVASC) 5 MG tablet TAKE ONE TABLET BY MOUTH DAILY Yes Shona Vidal MD   levothyroxine (SYNTHROID) 75 MCG tablet TAKE ONE TABLET BY MOUTH DAILY Yes Robbin Headley MD   dilTIAZem (CARDIZEM 12 HR) 120 MG extended release capsule Take 1 capsule by mouth every 12 hours as needed (palpitations and elevated HR. Please check blood pressure at home before taking medication, do not take if systolic pressure (upper number) is below 100) Yes Margaret Key PA-C   finasteride (PROSCAR) 5 MG tablet Take 5 mg by mouth daily Yes Historical Provider, MD   memantine (NAMENDA) 5 MG tablet  Yes Historical Provider, MD   rivaroxaban (XARELTO) 20 MG TABS tablet Take 1 tablet by mouth every 24 hours Yes Shona Vidal MD   alfuzosin (UROXATRAL) 10 MG SR tablet Take 10 mg by mouth daily Yes Historical Provider, MD   aspirin 81 MG tablet Take 81 mg by mouth daily.    Yes Historical Provider, MD       Past Medical History:   Diagnosis Date    Cardiomyopathy ([address]     Depressive disorder     major    Essential hypertension     Fall 10/2013    Was walking and missed his step and fell.  H/O cardiovascular stress test 07/08/2016    EF 53% Normal study    H/O echocardiogram 03/11/2013    EF =>55%, abnormal LV diastolic function Stage 1, mild mitral and triuspid regurg,normal LV systolic function, no pericarial effusion.  H/O exercise stress test 05/14/2013    EXERCISE STRESS-negative stress test    History of echocardiogram 08/20/2018    Left ventricular systolic function is normal with an ejection fraction of 55-60%. Mild concentric left ventricular hypertrophy. The left atrium is mildly dilated. No significant valvular disease or diastolic dysfunction noted. No evidence of pericardial effusion.     History of Holter monitoring 7/14/14    1 episode of a-fib x 11 hours and 58 minutes    Hx of Venous Doppler ultrasound 11/09/2020    No significant reflux in RLE, Significant reflux in LGSV, No DVT or SVT    Impotence     Osteopenia     PAF (paroxysmal atrial fibrillation) (HCC)     Polyp of colon     S/P PTCA (percutaneous transluminal coronary angioplasty) 11/15/2012    stenting of 2 sites in RCA    Type [facility] mellitus [facility]     Varicose veins of both lower extremities     VHD (valvular heart disease)        Past Surgical History:   Procedure Laterality Date    ANUS SURGERY      anal fistula-70's    CHOLECYSTECTOMY  2009    PTCA  11/2012    TONSILLECTOMY AND ADENOIDECTOMY         Family History   Problem Relation Age of Onset    Tuberculosis Mother     Heart Surgery Father     Pacemaker Father     Other Brother         knee issues    No Known Problems Brother     No Known Problems Brother     Depression Daughter        CareTeam (Including outside providers/suppliers regularly involved in providing care):   Patient Care Team:  Tawanda Raya MD as PCP - General  Tawanda Raya MD as P[facility] Empaneled Provider  Belén Oneal MD as Consulting Physician (Cardiology)    Wt Readings from Last 3 Encounters:   08/25/21 186 lb (84.4 kg)   05/25/21 189 lb 9.6 oz (86 kg)   04/29/21 192 lb 3.2 oz (87.2 kg)     There were no vitals filed for this visit. There is no height or weight on file to calculate BMI. Based upon direct observation of the patient, evaluation of cognition reveals recent and remote memory intact. Patient's complete Health Risk Assessment and screening values have been reviewed and are found in Flowsheets. The following problems were reviewed today and where indicated follow up appointments were made and/or referrals ordered. Positive Risk Factor Screenings with Interventions:          General Health and ACP:  General  In general, how would you say your health is?: Good  In the past 7 days, have you experienced any of the following?  New or Increased Pain, New or Increased Fatigue, Loneliness, Social Isolation, Stress or Anger?: None of These  Do you get the social and emotional support that you need?: Yes  Do you have a Living Will?: Yes  Advance Directives     Power of  Living Will ACP-Advance Directive ACP-Power of     Not on File Not on File Not on File Not on File      General Health Risk Interventions:  · No Living Will: ACP documents already completed- patient asked to provide copy to the office      Safety:  Safety  Do you have working smoke detectors?: Yes  Have all throw rugs been removed or fastened?: (!) No  Do you have non-slip mats or surfaces in all bathtubs/showers?: Yes  Do all of your stairways have a railing or banister?: Yes  Are your doorways, halls and stairs free of clutter?: Yes  Do you always fasten your seatbelt when you are in a car?: Yes  Safety Interventions:  · Home safety tips provided verbally     Personalized Preventive Plan   Current Health Maintenance Status  Immunization History   Administered Date(s) Administered    COVID-19, Moderna, PF, 100mcg/0.5mL 01/19/2021, 02/16/2021    Influenza Virus Vaccine 09/01/2014, 10/01/2020    Influenza, High Dose (Fluzone 65 yrs and older) 11/04/2013, 11/13/2015, 10/16/2017, 10/15/2018    Influenza, High-dose, Amy Tubbs, 65 yrs +, IM (Fluzone) 10/19/2020    Influenza, Triv, inactivated, subunit, adjuvanted, IM (Fluad 65 yrs and older) 10/29/2019    Pneumococcal Conjugate 13-valent ([id number]) 07/13/2015    Pneumococcal Polysaccharide ([id number]) 10/16/2017    Tdap (Boostrix, Adacel) 12/09/2013, 01/27/2017    Zoster Live (Zostavax) 06/03/2015        Health Maintenance   Topic Date Due    Shingles Vaccine (2 of 3) 07/29/2015    Annual Wellness Visit (AWV)  Never done    Flu vaccine (1) 09/01/2021    Lipid screen  12/08/2021    TSH testing  12/08/2021    Potassium monitoring  12/08/2021    Creatinine monitoring  12/08/2021    DTaP/Tdap/Td vaccine (3 - Td or Tdap) 01/27/2027    Pneumococcal 65+ years Vaccine  Completed    COVID-19 Vaccine  Completed    Hepatitis A vaccine  Aged Out    Hib vaccine  Aged Out    Meningococcal (ACWY) vaccine  Aged Out     Recommendations for [place]car[place] Due: see orders and patient instructions/AVS. Discussed need for shingles vaccinations, patient states PCP informed him of this at office visit this morning. Unable to obtain 3 vital signs due to patient not having equipment to take blood pressure/temperature. Recommended screening schedule for the next 5-10 years is provided to the patient in written form: see Patient Instructions/AVS.    Elliot WEBER LPN, [birth year], performed the documented evaluation under the direct supervision of the attending physician. Shiela Altamirano, was evaluated through a synchronous (real-time) audio-video encounter. The patient (or guardian if applicable) is aware that this is a billable service. Verbal consent to proceed has been obtained within the past 12 months.  The visit was conducted pursuant to the emergency declaration under the [facility], 1135 waiver authority and the [employer] General Act. Patient identification was verified, and a caregiver was present when appropriate. The patient was located in the state of [place], where the provider was credentialed to provide care. Total time spent for this encounter: Not billed by time    --Ignacio Joshi LPN on [id number] at 4:74 PM    An electronic signature was used to authenticate this note. This encounter was performed under my, Saddie Apgar, MDs, direct supervision, 2021.

## 2021-08-25 NOTE — PROGRESS NOTES
8/25/2021    Melani Hoang    Chief Complaint   Patient presents with    6 Month Follow-Up    Other     no c/o's       HPI    Liang Meléndez is a [de-identified] y.o. male who presents today with follow-up. Patient notes a good home blood pressure and pulse average. 136/70 with a pulse of 52. Patient has chronic bradycardia. Patient only uses diltiazem when he is tachycardic. There is rare use of this. Patient notes good blood sugars without hypoglycemia. I reviewed his last A1c of 6.4. REVIEW OF SYSTEMS    Constitutional:  Denies fever, chills, weight loss or weakness  Eyes:  no photophobia or discharge  ENT:  no sore throat or ear pain  Cardiovascular:  Denies chest pain, palpitations or swelling  Respiratory:  Denies cough or shortness of breath  GI:  no abdominal pain, nausea, vomiting, or diarrhea  Musculoskeletal:  no back pain  Skin:  No rashes  Neurologic:  no headache, focal weakness, or sensory changes  Endocrine:  no polyuria or polydipsia      PAST MEDICAL HISTORY  Past Medical History:   Diagnosis Date    [facility]     Depressive disorder     major    Essential hypertension     Fall 10/2013    Was walking and missed his step and fell.  H/O cardiovascular stress test 07/08/2016    EF 53% Normal study    H/O echocardiogram 03/11/2013    EF =>55%, abnormal LV diastolic function Stage 1, mild mitral and triuspid regurg,normal LV systolic function, no pericarial effusion.  H/O exercise stress test 05/14/2013    EXERCISE STRESS-negative stress test    History of echocardiogram 08/20/2018    Left ventricular systolic function is normal with an ejection fraction of 55-60%. Mild concentric left ventricular hypertrophy. The left atrium is mildly dilated. No significant valvular disease or diastolic dysfunction noted. No evidence of pericardial effusion.     History of Holter monitoring 7/14/14    1 episode of a-fib x 11 hours and 58 minutes    Hx of Venous Doppler ultrasound 11/09/2020    No significant reflux in RLE, Significant reflux in LGSV, No DVT or SVT    Impotence     Osteopenia     PAF (paroxysmal atrial fibrillation) (HCC)     Polyp of colon     S/P PTCA (percutaneous transluminal coronary angioplasty) 11/15/2012    stenting of 2 sites in RCA    Type 2 diabetes mellitus ([facility]     Varicose veins of both lower extremities     VHD (valvular heart disease)        FAMILY HISTORY  Family History   Problem Relation Age of Onset    Tuberculosis Mother     Heart Surgery Father     Pacemaker Father     Other Brother         knee issues    No Known Problems Brother     No Known Problems Brother     Depression Daughter        SOCIAL HISTORY  Social History     Socioeconomic History    Marital status:      Spouse name: Not on file    Number of children: Not on file    Years of education: Not on file    Highest education level: Not on file   Occupational History    Not on file   Tobacco Use    Smoking status: Former Smoker     Packs/day: 1.00     Years: 13.00     Pack years: 13.00     Start date: 5     Quit date: 10/29/1967     Years since quittin.8    Smokeless tobacco: Never Used   Vaping Use    Vaping Use: Never used   Substance and Sexual Activity    Alcohol use: Not Currently     Alcohol/week: 0.0 standard drinks     Comment: RARE, 2 cups coffee daily    Drug use: No    Sexual activity: Yes     Partners: Female     Comment:    Other Topics Concern    Not on file   Social History Narrative    Not on file     Social Determinants of Health     Financial Resource Strain: Low Risk     Difficulty of Paying Living Expenses: Not hard at all   Food Insecurity: No Food Insecurity    Worried About Running Out of Food in the Last Year: Never true    Lupe of Food in the Last Year: Never true   Transportation Needs:     Lack of Transportation (Medical):      Lack of Transportation (Non-Medical):    Physical Activity:     Days of Exercise per Week:     Minutes of Exercise per Session:    Stress:     Feeling of Stress :    Social Connections:     Frequency of Communication with Friends and Family:     Frequency of Social Gatherings with Friends and Family:     Attends Sabianist Services:     Active Member of Clubs or Organizations:     Attends Club or Organization Meetings:     Marital Status:    Intimate Partner Violence:     Fear of Current or Ex-Partner:     Emotionally Abused:     Physically Abused:     Sexually Abused:         SURGICAL HISTORY  Past Surgical History:   Procedure Laterality Date    ANUS SURGERY      anal fistula-70's    CHOLECYSTECTOMY  2009    PTCA  11/2012    TONSILLECTOMY AND ADENOIDECTOMY         CURRENT MEDICATIONS  Current Outpatient Medications   Medication Sig Dispense Refill    atorvastatin (LIPITOR) 20 MG tablet TAKE ONE TABLET BY MOUTH DAILY 90 tablet 1    losartan (COZAAR) 100 MG tablet TAKE ONE TABLET BY MOUTH DAILY 90 tablet 1    amLODIPine (NORVASC) 5 MG tablet TAKE ONE TABLET BY MOUTH DAILY 30 tablet 5    levothyroxine (SYNTHROID) 75 MCG tablet TAKE ONE TABLET BY MOUTH DAILY 90 tablet 0    dilTIAZem (CARDIZEM 12 HR) 120 MG extended release capsule Take 1 capsule by mouth every 12 hours as needed (palpitations and elevated HR. Please check blood pressure at home before taking medication, do not take if systolic pressure (upper number) is below 100) 10 capsule 3    finasteride (PROSCAR) 5 MG tablet Take 5 mg by mouth daily      memantine (NAMENDA) 5 MG tablet       rivaroxaban (XARELTO) 20 MG TABS tablet Take 1 tablet by mouth every 24 hours 90 tablet 3    alfuzosin (UROXATRAL) 10 MG SR tablet Take 10 mg by mouth daily      aspirin 81 MG tablet Take 81 mg by mouth daily. No current facility-administered medications for this visit. ALLERGIES  Allergies   Allergen Reactions    Lisinopril Other (See Comments)     Ace cough.     Pcn [Penicillins] Rash       PHYSICAL EXAM  /86   Pulse (!) 47   Ht 5' 11\" (1.803 m)   Wt 186 lb (84.4 kg)   SpO2 99%   BMI 25.94 kg/m²     ASSESSMENT & PLAN    1. Cardiomyopathy, unspecified type ([facility]  Issue controlled. Continue meds. Refilled meds. 2. Essential hypertension  Issue controlled. Continue meds. Refilled meds. - atorvastatin (LIPITOR) 20 MG tablet; TAKE ONE TABLET BY MOUTH DAILY  Dispense: 90 tablet; Refill: 1  - losartan (COZAAR) 100 MG tablet; TAKE ONE TABLET BY MOUTH DAILY  Dispense: 90 tablet; Refill: 1    3. PAF (paroxysmal atrial fibrillation) ([facility]  Patient without significant tachycardia on current meds    4. Acquired hypothyroidism  Issue is stable check labs today. Adjust medication off of lab results.  - TSH WITH REFLEX TO FT4; Future    5. Type 2 diabetes mellitus without complication, without long-term current use of insulin (HCC)  Issue is stable check labs today. Adjust medication off of lab results.   - Hemoglobin A1C; Future    Follow-up 6 months       Electronically signed by Florencio Aase, MD on 8/25/2021

## 2021-11-08 ENCOUNTER — OFFICE VISIT (OUTPATIENT)
Dept: CARDIOLOGY CLINIC | Age: 81
End: 2021-11-08
Payer: MEDICARE

## 2021-11-08 VITALS
DIASTOLIC BLOOD PRESSURE: 68 MMHG | WEIGHT: 185.4 LBS | HEART RATE: 50 BPM | HEIGHT: 71 IN | SYSTOLIC BLOOD PRESSURE: 132 MMHG | BODY MASS INDEX: 25.96 KG/M2

## 2021-11-08 DIAGNOSIS — E78.00 PURE HYPERCHOLESTEROLEMIA: ICD-10-CM

## 2021-11-08 DIAGNOSIS — I48.0 PAF (PAROXYSMAL ATRIAL FIBRILLATION) (HCC): ICD-10-CM

## 2021-11-08 DIAGNOSIS — I83.93 VARICOSE VEINS OF BOTH LOWER EXTREMITIES, UNSPECIFIED WHETHER COMPLICATED: ICD-10-CM

## 2021-11-08 DIAGNOSIS — I38 VHD (VALVULAR HEART DISEASE): ICD-10-CM

## 2021-11-08 DIAGNOSIS — I25.119 CORONARY ARTERY DISEASE INVOLVING NATIVE CORONARY ARTERY OF NATIVE HEART WITH ANGINA PECTORIS (HCC): Primary | ICD-10-CM

## 2021-11-08 DIAGNOSIS — I10 ESSENTIAL HYPERTENSION: ICD-10-CM

## 2021-11-08 DIAGNOSIS — I25.5 ISCHEMIC CARDIOMYOPATHY: ICD-10-CM

## 2021-11-08 DIAGNOSIS — E11.9 TYPE 2 DIABETES MELLITUS WITHOUT COMPLICATION, WITHOUT LONG-TERM CURRENT USE OF INSULIN (HCC): ICD-10-CM

## 2021-11-08 PROCEDURE — 93000 ELECTROCARDIOGRAM COMPLETE: CPT | Performed by: INTERNAL MEDICINE

## 2021-11-08 PROCEDURE — 99212 OFFICE O/P EST SF 10 MIN: CPT | Performed by: INTERNAL MEDICINE

## 2021-11-08 RX ORDER — MEMANTINE HYDROCHLORIDE 10 MG/1
10 TABLET ORAL 2 TIMES DAILY
COMMUNITY

## 2021-11-08 NOTE — LETTER
2021 @9:30  Patient Name: Zari Hawkins  : 1940  MRN# [id number]    REASON FOR VISIT:   Cardiology Problems  Pure hypercholesterolemia    Cardiomyopathy ([address]    VHD (valvular heart disease)    Coronary artery disease involving native coronary artery of native heart with angina pectoris (HCC)    PAF (paroxysmal atrial fibrillation) (HCC)    Varicose veins of both lower extremities    Essential hypertension       Other  H/O echocardiogram    H/O exercise stress test    Primary osteoarthritis involving multiple joints    Impotence    Polyp of colon    Osteopenia    Type 2 diabetes mellitus (HCC)    Acquired hypothyroidism    Depressive disorder    Leg edema    Current Outpatient Medications   Medication Sig Dispense Refill    atorvastatin (LIPITOR) 20 MG tablet TAKE ONE TABLET BY MOUTH DAILY 90 tablet 1    losartan (COZAAR) 100 MG tablet TAKE ONE TABLET BY MOUTH DAILY 90 tablet 1    amLODIPine (NORVASC) 5 MG tablet TAKE ONE TABLET BY MOUTH DAILY 30 tablet 5    levothyroxine (SYNTHROID) 75 MCG tablet TAKE ONE TABLET BY MOUTH DAILY 90 tablet 0    dilTIAZem (CARDIZEM 12 HR) 120 MG extended release capsule Take 1 capsule by mouth every 12 hours as needed (palpitations and elevated HR. Please check blood pressure at home before taking medication, do not take if systolic pressure (upper number) is below 100) 10 capsule 3    finasteride (PROSCAR) 5 MG tablet Take 5 mg by mouth daily      memantine (NAMENDA) 5 MG tablet       rivaroxaban (XARELTO) 20 MG TABS tablet Take 1 tablet by mouth every 24 hours 90 tablet 3    alfuzosin (UROXATRAL) 10 MG SR tablet Take 10 mg by mouth daily      aspirin 81 MG tablet Take 81 mg by mouth daily. No current facility-administered medications for this visit. Smoke: What:                           How much:    Alcohol:                                      How Much:     Caffeine: Pop:         Tea:            Coffee: Chocolate:    Exercise:    Labs: Lipids:             CBC:       BMP/CMP:          TSH:              A1C:    Last Visit:  Complaints:  Changes:    Last EKG:10/2020    VL DUP LOWER EXTREMITY VENOUS LEFT:7/2021  The Left GSV is non-compressible with no evidence of flow just past the    saphenofemoral junction to the distal calf.    Left CFV is patent with good compressibility and respirophasic signal with    good augmentation   Only had reflux in lgsv , all veins are done       STRESS TEST:  10/2020  Supervising physician Dr. Lindy Pizarro .    abnormal stress test, mildly depressed LVEF, Decreased uptake inferiorly due    to diaphragmatic artifact. Myocardial perfusion scan shows small size,    moderate intensity, partially reversible perfusion defect in inferior wall.           ECHO: 8/2018  Left ventricular systolic function is normal with an ejection fraction of   55-60%. Mild concentric left ventricular hypertrophy. The left atrium is mildly dilated. No significant valvular disease or diastolic dysfunction noted. No evidence of pericardial effusion    CAROTID: NONE    MUGA: NONE    LAST PACER CHECK: NONE    CARDIAC CATH: 114/2012    Amio Protocol:    CHADS: [id number] Score for Atrial Fibrillation Stroke Risk   Risk   Factors  Component Value   C CHF No 0   H HTN Yes 1   A2 Age >= 76 Yes,  (80 y.o.) 2   D DM Yes 1   S2 Prior Stroke/TIA No 0   V Vascular Disease No 0   A Age 74-69 No,  (80 y.o.) 0   Sc Sex male 0    [id number]  Score  4   Score last updated 11/8/21 1:39 AM EST    Click here for a link to the UpToDate guideline \"Atrial Fibrillation: Anticoagulation therapy to prevent embolization    Disclaimer: Risk Score calculation is dependent on accuracy of patient problem list and past encounter diagnosis.

## 2021-11-08 NOTE — LETTER
Tiffanie Espinoza  1940  [id number]    Have you had any Chest Pain that is not new? - No  If Yes DO EKG - How does it feel -    How long does the pain last -      How long have you been having the pain -    Did you take a    And did it relieve the pain -      DO EKG IF: Patient has a Heart Rate above 100 or below 40     CAD (Coronary Artery Disease) patient should have one on file every 6 months        Have you had any Shortness of Breath - No  If Yes - When     Have you had any dizziness - No  If Yes DO ORTHOSTATIC BP - when do you feel dizzy    How long does it last .        Sitting wait 5 minutes do supine (laying down) wait 5 minutes then do standing - log each in \"vitals\" area in Epic   Be sure to ask what symptoms they are having if they get dizzy while completing ortho stats such as: room spinning, nausea, etc.    Have you had any palpitations that are not new? - No  If Yes DO EKG - Do you feel your heart   How long does it last - . Is the patient on any of the following medications -   If Yes DO EKG - Needs done every 6 months    Do you have any edema - swelling in No    If Yes - CHECK TO SEE IF THE EDEMA IS PITTING  How long have they been having edema -   If Yes - Have they worn compression stockings   If they have worn compression stockings      Vein \"LEG PROBLEM Questionnaire\"  1. Do you have prominent leg veins? Yes   2. Do you have any skin discoloration? No  3. Do you have any healed or active sores? No  4. Do you have swelling of the legs? No  5. Do you have a family history of varicose veins? No  6. Does your profession involve pro-longed        standing or heavy lifting? No  7. Have you been fighting overweight problems? No  8. Do you have restless legs? Yes  9. Do you have any night time cramps? No  10.  Do you have any of the following in your legs:             When did you have your last labs drawn 12/2020  Where did you have them done   What doctor ordered     If we do not have these labs you are retrieve these labs for these providers! Do you have a surgery or procedure scheduled in the near future - No  If Yes- DO EKG  If Yes - Who is the surgery with?    Phone number of physician   Fax number of physician   Type of surgery   GIVE THIS INFORMATION TO RENZO ONEIL     Ask patient if they want to sign up for MyChart if they are not already signed up     Check to see if we have an E-MAIL on file for the patient     Check medication list thoroughly!!! AND RECONCILE OUTSIDE MEDICATIONS  If dose has changed change the entire order not just the [place] At check out add to every patient's \"wrap up\" the following dot phrase AFTERHOURSEDUCATION and ensure we explain this to our patients

## 2021-11-08 NOTE — PROGRESS NOTES
OFFICE PROGRESS NOTES      Casimiro Kilgore is a 80 y.o. male who has    CHIEF COMPLAINT AS FOLLOWS:  CHEST PAIN: Patient denies any C/O chest pains at this time.      SOB: No C/O SOB at this time.               LEG EDEMA:  B/L Lower extremity edema is present L > R. Better than before   PALPITATIONS: No C/O  of palpitations.   DIZZINESS: No C/O  Dizziness.   SYNCOPE: None   OTHER:                                    HPI: Patient is here for F/U on his CAD,CVI, PAF- Arrhythmia, HTN & Dyslipidemia problems. CAD: Patient has known CAD. Had angioplasty in the past.  Arrhythmia: Patient has known  PAF. HTN: Patient has known essential HTN. Has been treated with guideline recommended medical / physical/ diet therapy as stated below. Dyslipidemia: Patient has known mixed dyslipidemia. Has been treated with guideline recommended medical / physical/ diet therapy as stated below. Current Outpatient Medications   Medication Sig Dispense Refill    memantine (NAMENDA) 10 MG tablet Take 10 mg by mouth 2 times daily      atorvastatin (LIPITOR) 20 MG tablet TAKE ONE TABLET BY MOUTH DAILY 90 tablet 1    losartan (COZAAR) 100 MG tablet TAKE ONE TABLET BY MOUTH DAILY 90 tablet 1    amLODIPine (NORVASC) 5 MG tablet TAKE ONE TABLET BY MOUTH DAILY 30 tablet 5    levothyroxine (SYNTHROID) 75 MCG tablet TAKE ONE TABLET BY MOUTH DAILY 90 tablet 0    dilTIAZem (CARDIZEM 12 HR) 120 MG extended release capsule Take 1 capsule by mouth every 12 hours as needed (palpitations and elevated HR. Please check blood pressure at home before taking medication, do not take if systolic pressure (upper number) is below 100) 10 capsule 3    finasteride (PROSCAR) 5 MG tablet Take 5 mg by mouth daily      rivaroxaban (XARELTO) 20 MG TABS tablet Take 1 tablet by mouth every 24 hours 90 tablet 3    alfuzosin (UROXATRAL) 10 MG SR tablet Take 10 mg by mouth daily      aspirin 81 MG tablet Take 81 mg by mouth daily.          No current facility-administered medications for this visit. Allergies: Lisinopril and Pcn [penicillins]  Review of Systems:    Constitutional: Negative for diaphoresis and fatigue  Respiratory: Negative for shortness of breath  Cardiovascular: Negative for chest pain, dyspnea on exertion, claudication, edema, irregular heartbeat, murmur, palpitations or shortness of breath  Musculoskeletal: Negative for muscle pain, muscular weakness, negative for pain in arm and leg or swelling in foot and leg    Objective:  /68   Pulse 50   Ht 5' 11\" (1.803 m)   Wt 185 lb 6.4 oz (84.1 kg)   BMI 25.86 kg/m²   Wt Readings from Last 3 Encounters:   11/08/21 185 lb 6.4 oz (84.1 kg)   08/25/21 186 lb (84.4 kg)   05/25/21 189 lb 9.6 oz (86 kg)     Body mass index is 25.86 kg/m². GENERAL - Alert, oriented, pleasant, in no apparent distress. EYES: No jaundice, no conjunctival pallor. Neck - Supple. No jugular venous distention noted. No carotid bruits. Cardiovascular - Normal S1 and S2 without obvious murmur or gallop. Extremities - No cyanosis, clubbing, or significant edema. Pulmonary - No respiratory distress. No wheezes or rales.       Lab Review   Lab Results   Component Value Date    TROPONINT <0.010 10/10/2020    TROPONINT <0.010 01/10/2016     Lab Results   Component Value Date    PROBNP 393.0 10/10/2020    PROBNP 94.77 07/09/2014     Lab Results   Component Value Date    INR 1.13 11/12/2012     Lab Results   Component Value Date    LABA1C 6.1 08/25/2021    LABA1C 6.4 12/08/2020     Lab Results   Component Value Date    WBC 4.9 10/10/2020    WBC 5.5 08/25/2020    HCT 44.2 10/10/2020    HCT 41.0 (L) 08/25/2020    MCV 93.1 10/10/2020    MCV 94.9 08/25/2020     10/10/2020     08/25/2020     Lab Results   Component Value Date    CHOL 98 12/08/2020    CHOL 117 08/25/2020    TRIG 157 12/08/2020    TRIG 239 (H) 08/25/2020    HDL 28 (A) 12/08/2020    HDL 24 (L) 08/25/2020    LDLCALC 39 12/08/2020    [address] [place] [facility] 58 06/24/2019    LDLDIRECT 53 08/25/2020    LDLDIRECT 75 05/13/2013     Lab Results   Component Value Date    ALT 25 12/08/2020    ALT 15 10/10/2020    AST 15 12/08/2020    AST 15 10/10/2020     BMP:    Lab Results   Component Value Date     12/08/2020     10/10/2020    K 3.9 12/08/2020    K 4.2 10/10/2020     12/08/2020     10/10/2020    CO2 23 12/08/2020    CO2 23 10/10/2020    BUN 19 12/08/2020    BUN 17 10/10/2020    CREATININE 1.1 12/08/2020    CREATININE 0.9 10/10/2020     CMP:   Lab Results   Component Value Date     12/08/2020     10/10/2020    K 3.9 12/08/2020    K 4.2 10/10/2020     12/08/2020     10/10/2020    CO2 23 12/08/2020    CO2 23 10/10/2020    BUN 19 12/08/2020    BUN 17 10/10/2020    CREATININE 1.1 12/08/2020    CREATININE 0.9 10/10/2020    PROT 6.8 10/10/2020    PROT 6.5 08/25/2020    PROT 6.9 12/09/2013     Lab Results   Component Value Date    TSH 3.930 12/08/2020    TSH 3.70 06/24/2019    TSHHS 3.180 10/10/2020    TSHHS 12.770 08/25/2020        CARDIOLITE 10/2020    abnormal stress test, mildly depressed LVEF, Decreased uptake inferiorly due    to diaphragmatic artifact. Myocardial perfusion scan shows small size,    moderate intensity, partially reversible perfusion defect in inferior wall.      ECHO 8/2018   Left ventricular systolic function is normal with an ejection fraction of   55-60%.  Mild concentric left ventricular hypertrophy.   The left atrium is mildly dilated.   No significant valvular disease or diastolic dysfunction noted.   No evidence of pericardial effusion. EKG: Sinus Bradycardia 50/min. QUALITY MEASURES REVIEWED:  1.CAD:Patient is taking anti platelet agent:Yes  2. DYSLIPIDEMIA: Patient is on cholesterol lowering medication:Yes   3. Beta-Blocker therapy for CAD, if prior Myocardial Infarction:No   4. Counselled regarding smoking cessation. No   Patient does not Smoke.   5.Anticoagulation therapy (for A.Fib) Yes  6. Discussed weight management strategies. Assessment & Plan:  Primary / Secondary prevention is the goal by aggressive risk modification, healthy and therapeutic life style changes for cardiovascular risk reduction. CORONARY ARTERY DISEASE: Yes   S/P PTCA (percutaneous transluminal coronary angioplasty) 11/15/2012     stenting of 2 sites in RCA   Recent Cardiolite abnormality is not severe enough to warrant further testing.   clinically stable. Patient is on optimal medical regimen ( see medication list above )  -      Patient is currently  asymptomatic from CAD.          - changes in  treatment:   no           - Testing ordered:  no  [place] classification: 1  [place] RISK SCORE:  [place] RISK SCORE:  HTN:well controlled on current medical regimen, see list above.              - changes in  treatment:   no   CARDIOMYOPATHY: None known   CONGESTIVE HEART FAILURE: NO KNOWN HISTORY.      VHD: No significant VHD noted  DYSLIPIDEMIA: Patient's profile is at / near [facility] is low                                    Tolerating current medical regimen wellyes,                                                           See most recent Lab values in Labs section above.   Diabetes mellitis: .yes, Diet controlled.                                     Managed by family MD                                     BS under good control yes,                                      Hgb A1c avilable yes,   OTHER RELEVANT DIAGNOSIS:as noted in patient's active problem list:  CVI: S/P Left GSV ablation.   TESTS ORDERED: none this visit                                           All previously ordered tests reviewed.   ARRHYTHMIAS:  known but no more C/O Palpitations                                Patient has H/O Atrial fibrillation                                He is rate controlled & on anticoagulation.     TESTS ORDERED:none this visit     PREVIOUSLY ORDERED TESTS REVIEWED & DISCUSSED WITH THE PATIENT:     I personally reviewed & interpreted, all previously ordered tests as copied above. Latest Labs are pulled in to the note with dates. Labs, specially in Reference to Lipid profile, Cardiac testing in the form of Echo, stress tests & other relevant cardiac testing reviewed with patient & recommendations made based on assessment of the results. MEDICATIONS: List of medications patient is currently taking is reviewed in detail with the patient . Discussed any side effects or problems taking the medication. Recommend Continue present management & medications as listed. AFFIRMATION: I spent at least 20 minutes of time reviewing patient's history, previous & current medical problems & all Labs + testing. This includes chart prep even prior to the vosit. Various goals are discussed and multiple questions answered. Relevant concelling performed. Office follow up in six months.

## 2021-11-08 NOTE — PATIENT INSTRUCTIONS
CORONARY ARTERY DISEASE: Yes   S/P PTCA (percutaneous transluminal coronary angioplasty) 11/15/2012     stenting of 2 sites in RCA   Recent Cardiolite abnormality is not severe enough to warrant further testing.   clinically stable. Patient is on optimal medical regimen ( see medication list above )  -      Patient is currently  asymptomatic from CAD.          - changes in  treatment:   no           - Testing ordered:  no  [place] classification: 1  [place] RISK SCORE:  ROSAILE RISK SCORE:  HTN:well controlled on current medical regimen, see list above.              - changes in  treatment:   no   CARDIOMYOPATHY: None known   CONGESTIVE HEART FAILURE: NO KNOWN HISTORY.      VHD: No significant VHD noted  DYSLIPIDEMIA: Patient's profile is at / near [facility] is low                                    Tolerating current medical regimen wellyes,                                                           See most recent Lab values in Labs section above.   Diabetes mellitis: .yes, Diet controlled.                                     Managed by family MD                                     BS under good control yes,                                      Hgb A1c avilable yes,   OTHER RELEVANT DIAGNOSIS:as noted in patient's active problem list:  CVI: S/P Left GSV ablation.   TESTS ORDERED: none this visit                                           All previously ordered tests reviewed.   ARRHYTHMIAS:  known but no more C/O Palpitations                                Patient has H/O Atrial fibrillation                                He is rate controlled & on anticoagulation. TESTS ORDERED:none this visit     PREVIOUSLY ORDERED TESTS REVIEWED & DISCUSSED WITH THE PATIENT:     I personally reviewed & interpreted, all previously ordered tests as copied above. Latest Labs are pulled in to the note with dates.    Labs, specially in Reference to Lipid profile, Cardiac testing in the form of Echo, stress tests & other relevant cardiac testing reviewed with patient & recommendations made based on assessment of the results. MEDICATIONS: List of medications patient is currently taking is reviewed in detail with the patient . Discussed any side effects or problems taking the medication. Recommend Continue present management & medications as listed. AFFIRMATION: I spent at least 20 minutes of time reviewing patient's history, previous & current medical problems & all Labs + testing. This includes chart prep even prior to the vosit. Various goals are discussed and multiple questions answered. Relevant concelling performed. Office follow up in six months.

## 2021-11-08 NOTE — LETTER
[address]  [address] [address]  Phone: [phone]  Fax: [phone]    Devon Dunlap MD    November 8, 2021     [address]    Patient: Nandini Baker   MR Number: [id number]   YOB: 1940   Date of Visit: 11/8/2021       Dear Bianca Mcqueen: Thank you for referring Ravinder Hammond to me for evaluation/treatment. Below are the relevant portions of my assessment and plan of care. If you have questions, please do not hesitate to call me. I look forward to following Desi Brown along with you.     Sincerely,      Devon Dunlap MD

## 2021-12-15 NOTE — RESULT ENCOUNTER NOTE
Please have patient schedule office visit with Dr. Leticia Steele soon.
Detail Level: Detailed
Detail Level: Generalized
Detail Level: Zone
Detail Level: Simple

## 2021-12-20 LAB
ALBUMIN SERPL-MCNC: 4.1 G/DL
ALP BLD-CCNC: 60 U/L
ALT SERPL-CCNC: 15 U/L
ANION GAP SERPL CALCULATED.3IONS-SCNC: 8 MMOL/L
AST SERPL-CCNC: 17 U/L
BILIRUB SERPL-MCNC: 0.9 MG/DL (ref 0.1–1.4)
BUN BLDV-MCNC: NORMAL MG/DL
CALCIUM SERPL-MCNC: 9.7 MG/DL
CHLORIDE BLD-SCNC: 107 MMOL/L
CHOLESTEROL, TOTAL: 115 MG/DL
CHOLESTEROL/HDL RATIO: 115
CO2: 26 MMOL/L
CREAT SERPL-MCNC: 1 MG/DL
ESTIMATED AVERAGE GLUCOSE: NORMAL
GFR CALCULATED: >60
GLUCOSE BLD-MCNC: 113 MG/DL
HBA1C MFR BLD: 6.2 %
HDLC SERPL-MCNC: 26 MG/DL (ref 35–70)
LDL CHOLESTEROL CALCULATED: 63 MG/DL (ref 0–160)
NONHDLC SERPL-MCNC: 89 MG/DL
POTASSIUM SERPL-SCNC: 4 MMOL/L
SODIUM BLD-SCNC: 141 MMOL/L
TOTAL PROTEIN: 6.8
TRIGL SERPL-MCNC: 133 MG/DL
TSH SERPL DL<=0.05 MIU/L-ACNC: 2.66 UIU/ML
VLDLC SERPL CALC-MCNC: ABNORMAL MG/DL

## 2022-01-05 ENCOUNTER — OFFICE VISIT (OUTPATIENT)
Dept: FAMILY MEDICINE CLINIC | Age: 82
End: 2022-01-05
Payer: MEDICARE

## 2022-01-05 VITALS
HEART RATE: 52 BPM | DIASTOLIC BLOOD PRESSURE: 70 MMHG | OXYGEN SATURATION: 97 % | HEIGHT: 71 IN | BODY MASS INDEX: 26.32 KG/M2 | WEIGHT: 188 LBS | SYSTOLIC BLOOD PRESSURE: 116 MMHG

## 2022-01-05 DIAGNOSIS — I25.5 ISCHEMIC CARDIOMYOPATHY: ICD-10-CM

## 2022-01-05 DIAGNOSIS — I25.119 CORONARY ARTERY DISEASE INVOLVING NATIVE CORONARY ARTERY OF NATIVE HEART WITH ANGINA PECTORIS (HCC): ICD-10-CM

## 2022-01-05 DIAGNOSIS — I10 ESSENTIAL HYPERTENSION: ICD-10-CM

## 2022-01-05 DIAGNOSIS — L82.1 SEBORRHEIC KERATOSES: Primary | ICD-10-CM

## 2022-01-05 DIAGNOSIS — I48.0 PAF (PAROXYSMAL ATRIAL FIBRILLATION) (HCC): ICD-10-CM

## 2022-01-05 DIAGNOSIS — E03.9 ACQUIRED HYPOTHYROIDISM: ICD-10-CM

## 2022-01-05 DIAGNOSIS — E11.9 TYPE 2 DIABETES MELLITUS WITHOUT COMPLICATION, WITHOUT LONG-TERM CURRENT USE OF INSULIN (HCC): ICD-10-CM

## 2022-01-05 PROCEDURE — 99213 OFFICE O/P EST LOW 20 MIN: CPT | Performed by: FAMILY MEDICINE

## 2022-01-05 RX ORDER — ATORVASTATIN CALCIUM 20 MG/1
TABLET, FILM COATED ORAL
Qty: 90 TABLET | Refills: 1 | Status: SHIPPED | OUTPATIENT
Start: 2022-01-05 | End: 2022-05-06 | Stop reason: SDUPTHER

## 2022-01-05 RX ORDER — LEVOTHYROXINE SODIUM 0.07 MG/1
TABLET ORAL
Qty: 90 TABLET | Refills: 1 | Status: SHIPPED | OUTPATIENT
Start: 2022-01-05

## 2022-01-05 RX ORDER — LOSARTAN POTASSIUM 100 MG/1
TABLET ORAL
Qty: 90 TABLET | Refills: 1 | Status: SHIPPED | OUTPATIENT
Start: 2022-01-05 | End: 2022-05-06 | Stop reason: SDUPTHER

## 2022-01-09 NOTE — PROGRESS NOTES
1/9/2022    [employer]    Chief Complaint   Patient presents with   Muriel Lopez Check-Up     4 month follow up    Skin Problem     pt reports concers for itching on the back, states his wife noticed a spot on the back about x4-5 months ago, patient not sure what it is or what it looks like, states he goes to a dermatologist   Holland Malone     patient brought in labs from the [facility] to review       HPI    Sallie Tong is a 80 y.o. male who presents today with follow-up. Pruritic lesion on patient's back and wife is concerned visit a seborrheic keratoses. Patient continues to see dermatology regularly. Patient brought in [facility] labs that were excellent. I reviewed them with him. It proved diabetes to have excellent control with an A1c of 6.2    Patient notes no symptoms with atrial fibrillation tolerating Xarelto and diltiazem well      REVIEW OF SYSTEMS    Constitutional:  Denies fever, chills, weight loss or weakness  Eyes:  no photophobia or discharge  ENT:  no sore throat or ear pain  Cardiovascular:  Denies chest pain, palpitations or swelling  Respiratory:  Denies cough or shortness of breath  GI:  no abdominal pain, nausea, vomiting, or diarrhea  Musculoskeletal:  no back pain  Skin:  No rashes  Neurologic:  no headache, focal weakness, or sensory changes  Endocrine:  no polyuria or polydipsia      PAST MEDICAL HISTORY  Past Medical History:   Diagnosis Date    Cardiomyopathy ([address]     Depressive disorder     major    Essential hypertension     Fall 10/2013    Was walking and missed his step and fell.  H/O cardiovascular stress test 07/08/2016    EF 53% Normal study    H/O echocardiogram 03/11/2013    EF =>55%, abnormal LV diastolic function Stage 1, mild mitral and triuspid regurg,normal LV systolic function, no pericarial effusion.     H/O exercise stress test 05/14/2013    EXERCISE STRESS-negative stress test    History of echocardiogram 08/20/2018    Left ventricular systolic function is normal with an ejection fraction of 55-60%. Mild concentric left ventricular hypertrophy. The left atrium is mildly dilated. No significant valvular disease or diastolic dysfunction noted. No evidence of pericardial effusion.     History of Holter monitoring 14    1 episode of a-fib x 11 hours and 58 minutes    Hx of Venous Doppler ultrasound 2020    No significant reflux in RLE, Significant reflux in LGSV, No DVT or SVT    Impotence     Osteopenia     PAF (paroxysmal atrial fibrillation) (HCC)     Polyp of colon     S/P PTCA (percutaneous transluminal coronary angioplasty) 11/15/2012    stenting of 2 sites in RCA    Type 2 diabetes mellitus ([facility] [address]     Varicose veins of both lower extremities     VHD (valvular heart disease)        FAMILY HISTORY  Family History   Problem Relation Age of Onset    Tuberculosis Mother     Heart Surgery Father     Pacemaker Father     Other Brother         knee issues    No Known Problems Brother     No Known Problems Brother     Depression Daughter        SOCIAL HISTORY  Social History     Socioeconomic History    Marital status:      Spouse name: None    Number of children: None    Years of education: None    Highest education level: None   Occupational History    None   Tobacco Use    Smoking status: Former Smoker     Packs/day: 1.00     Years: 13.00     Pack years: 13.00     Start date:      Quit date: 10/29/1967     Years since quittin.2    Smokeless tobacco: Never Used   Vaping Use    Vaping Use: Never used   Substance and Sexual Activity    Alcohol use: Not Currently     Alcohol/week: 0.0 standard drinks     Comment: RARE, 2 cups coffee daily    Drug use: No    Sexual activity: Yes     Partners: Female     Comment:    Other Topics Concern    None   Social History Narrative    None     Social Determinants of Health     Financial Resource Strain: Low Risk     Difficulty of Paying Living Expenses: Not hard at all   Food Insecurity: No Food Insecurity    Worried About Running Out of Food in the Last Year: Never true    Lupe of Food in the Last Year: Never true   Transportation Needs:     Lack of Transportation (Medical): Not on file    Lack of Transportation (Non-Medical): Not on file   Physical Activity:     Days of Exercise per Week: Not on file    Minutes of Exercise per Session: Not on file   Stress:     Feeling of Stress : Not on file   Social Connections:     Frequency of Communication with Friends and Family: Not on file    Frequency of Social Gatherings with Friends and Family: Not on file    Attends Taoist Services: Not on file    Active Member of Clubs or Organizations: Not on file    Attends Club or Organization Meetings: Not on file    Marital Status: Not on file   Intimate Partner Violence:     Fear of Current or Ex-Partner: Not on file    Emotionally Abused: Not on file    Physically Abused: Not on file    Sexually Abused: Not on file   Housing Stability:     Unable to Pay for Housing in the Last Year: Not on file    Number of Places Lived in the Last Year: Not on file    Unstable Housing in the Last Year: Not on file        SURGICAL HISTORY  Past Surgical History:   Procedure Laterality Date    ANUS SURGERY      anal fistula-70's    CHOLECYSTECTOMY  2009    PTCA  11/2012    TONSILLECTOMY AND ADENOIDECTOMY         CURRENT MEDICATIONS  Current Outpatient Medications   Medication Sig Dispense Refill    losartan (COZAAR) 100 MG tablet TAKE ONE TABLET BY MOUTH DAILY 90 tablet 1    levothyroxine (SYNTHROID) 75 MCG tablet TAKE ONE TABLET BY MOUTH DAILY 90 tablet 1    atorvastatin (LIPITOR) 20 MG tablet TAKE ONE TABLET BY MOUTH DAILY 90 tablet 1    amLODIPine (NORVASC) 5 MG tablet TAKE ONE TABLET BY MOUTH DAILY 30 tablet 5    dilTIAZem (CARDIZEM 12 HR) 120 MG extended release capsule Take 1 capsule by mouth every 12 hours as needed (palpitations and elevated HR.   Please check blood pressure at home before taking medication, do not take if systolic pressure (upper number) is below 100) 10 capsule 3    finasteride (PROSCAR) 5 MG tablet Take 5 mg by mouth daily      rivaroxaban (XARELTO) 20 MG TABS tablet Take 1 tablet by mouth every 24 hours 90 tablet 3    alfuzosin (UROXATRAL) 10 MG SR tablet Take 10 mg by mouth daily      aspirin 81 MG tablet Take 81 mg by mouth daily.  memantine (NAMENDA) 10 MG tablet Take 10 mg by mouth 2 times daily (Patient not taking: Reported on 1/5/2022)       No current facility-administered medications for this visit. ALLERGIES  Allergies   Allergen Reactions    Lisinopril Other (See Comments)     Ace cough.  Pcn [Penicillins] Rash       PHYSICAL EXAM  /70   Pulse 52   Ht 5' 11\" (1.803 m)   Wt 188 lb (85.3 kg)   SpO2 97%   BMI 26.22 kg/m²     ASSESSMENT & PLAN    1. Seborrheic keratoses  Patient reassured    2. Ischemic cardiomyopathy  Issue controlled. Continue meds. Refilled meds. 3. PAF (paroxysmal atrial fibrillation) (HCC)  Issue controlled. Continue meds. Refilled meds. 4. Coronary artery disease involving native coronary artery of native heart with angina pectoris ([address]  Issue controlled. Continue meds. Refilled meds. 5. Type 2 diabetes mellitus without complication, without long-term current use of insulin ([address]  Diabetes well controlled  Continue the same    6. Acquired hypothyroidism  Reviewed labs  At goal  Remain on the same  - levothyroxine (SYNTHROID) 75 MCG tablet; TAKE ONE TABLET BY MOUTH DAILY  Dispense: 90 tablet; Refill: 1    7. Essential hypertension  Issue controlled. Continue meds. Refilled meds. - losartan (COZAAR) 100 MG tablet; TAKE ONE TABLET BY MOUTH DAILY  Dispense: 90 tablet; Refill: 1  - atorvastatin (LIPITOR) 20 MG tablet; TAKE ONE TABLET BY MOUTH DAILY  Dispense: 90 tablet;  Refill: 1    Follow-up 6 months    Patient billed off total time-20minutes  5 minutes prechart review  10 minutes spent with patient  5 minutes creating note            Electronically signed by Darcie Walton MD on 1/9/2022

## 2022-01-31 RX ORDER — AMLODIPINE BESYLATE 5 MG/1
5 TABLET ORAL DAILY
Qty: 30 TABLET | Refills: 5 | Status: SHIPPED | OUTPATIENT
Start: 2022-01-31 | End: 2022-05-06 | Stop reason: SDUPTHER

## 2022-05-06 ENCOUNTER — OFFICE VISIT (OUTPATIENT)
Dept: CARDIOLOGY CLINIC | Age: 82
End: 2022-05-06
Payer: MEDICARE

## 2022-05-06 VITALS
HEART RATE: 52 BPM | SYSTOLIC BLOOD PRESSURE: 146 MMHG | BODY MASS INDEX: 26.74 KG/M2 | WEIGHT: 191 LBS | DIASTOLIC BLOOD PRESSURE: 66 MMHG | HEIGHT: 71 IN

## 2022-05-06 DIAGNOSIS — I83.93 VARICOSE VEINS OF BOTH LOWER EXTREMITIES, UNSPECIFIED WHETHER COMPLICATED: ICD-10-CM

## 2022-05-06 DIAGNOSIS — E78.00 PURE HYPERCHOLESTEROLEMIA: ICD-10-CM

## 2022-05-06 DIAGNOSIS — I10 ESSENTIAL HYPERTENSION: ICD-10-CM

## 2022-05-06 DIAGNOSIS — I48.0 PAF (PAROXYSMAL ATRIAL FIBRILLATION) (HCC): ICD-10-CM

## 2022-05-06 DIAGNOSIS — I25.119 CORONARY ARTERY DISEASE INVOLVING NATIVE CORONARY ARTERY OF NATIVE HEART WITH ANGINA PECTORIS (HCC): Primary | ICD-10-CM

## 2022-05-06 DIAGNOSIS — E11.9 TYPE 2 DIABETES MELLITUS WITHOUT COMPLICATION, WITHOUT LONG-TERM CURRENT USE OF INSULIN (HCC): ICD-10-CM

## 2022-05-06 DIAGNOSIS — I38 VHD (VALVULAR HEART DISEASE): ICD-10-CM

## 2022-05-06 PROCEDURE — 99213 OFFICE O/P EST LOW 20 MIN: CPT | Performed by: INTERNAL MEDICINE

## 2022-05-06 RX ORDER — AMLODIPINE BESYLATE 5 MG/1
5 TABLET ORAL DAILY
Qty: 30 TABLET | Refills: 5 | Status: SHIPPED | OUTPATIENT
Start: 2022-05-06

## 2022-05-06 RX ORDER — LOSARTAN POTASSIUM 100 MG/1
TABLET ORAL
Qty: 90 TABLET | Refills: 1 | Status: SHIPPED | OUTPATIENT
Start: 2022-05-06

## 2022-05-06 RX ORDER — ATORVASTATIN CALCIUM 20 MG/1
TABLET, FILM COATED ORAL
Qty: 90 TABLET | Refills: 1 | Status: SHIPPED | OUTPATIENT
Start: 2022-05-06 | End: 2022-09-16

## 2022-05-06 NOTE — PATIENT INSTRUCTIONS
CORONARY ARTERY DISEASE:Yes   S/P PTCA (percutaneous transluminal coronary angioplasty) 11/15/2012     stenting of 2 sites in RCA   Recent Cardiolite abnormality is not severe enough to warrant further testing.   clinically stable. Patient is on optimal medical regimen ( see medication list above )  -      Patient is currently  asymptomatic from CAD.          - changes in  treatment:   no, on ASA           - Testing ordered:  no  [facility] classification: 1     CARDIOLITE 10/2020    abnormal stress test, mildly depressed LVEF, Decreased uptake inferiorly due    to diaphragmatic artifact. Myocardial perfusion scan shows small size,    moderate intensity, partially reversible perfusion defect in inferior wall. HTN:well controlled on current medical regimen, see list above.              - changes in  treatment:   no, on Cozaar & Amlodipine   CARDIOMYOPATHY: None known   CONGESTIVE HEART FAILURE: NO KNOWN HISTORY.      VHD: No significant VHD noted     ECHO 8/2018   Left ventricular systolic function is normal with an ejection fraction of   55-60%.    Mild concentric left ventricular hypertrophy.   The left atrium is mildly dilated.   No significant valvular disease or diastolic dysfunction noted.   No evidence of pericardial effusion.     DYSLIPIDEMIA: Patient's profile is at / near [facility] is low                                    Tolerating current medical regimen wellyes, takes Lipitor                                                        See most recent Lab values in Labs section above.   Diabetes mellitis: .yes, Diet controlled.                                     Managed by family MD                                     BS under good control yes,                                      Hgb A1c avilable yes,   OTHER RELEVANT DIAGNOSIS:as noted in patient's active problem list:  CVI: S/P Left GSV ablation.    ARRHYTHMIAS:  known but no more C/O Palpitations                                Patient has H/O Atrial fibrillation                                He is rate controlled & on anticoagulation with Xarelto. .     TESTS ORDERED: none this visit     PREVIOUSLY ORDERED TESTS REVIEWED & DISCUSSED WITH THE PATIENT:     I personally reviewed & interpreted, all previously ordered tests as copied above. Latest Labs are pulled in to the note with dates. Labs, specially in Reference to Lipid profile, Cardiac testing in the form of Echo ( dated: ), stress tests ( dated: ) & other relevant cardiac testing reviewed with patient & recommendations made based on assessment of the results. Discussed role of Cardiac risk factors & effects + treatment of co morbidities with patient & advised accordingly. MEDICATIONS: List of medications patient is currently taking is reviewed in detail with the patient. Discussed any side effects or problems taking the medication. Recommend Continue present management & medications as listed. AFFIRMATION: I spent at least 20 minutes of time reviewing patient's history, previous & current medical problems & all Labs + testing. This includes chart prep even prior to the vosit. Various goals are discussed and multiple questions answered. Relevant concelling performed. Office follow up in six months.

## 2022-05-06 NOTE — PROGRESS NOTES
OFFICE PROGRESS NOTES      Niko Wei is a 80 y.o. male who has    CHIEF COMPLAINT AS FOLLOWS:  CHEST PAIN: Patient denies any C/O chest pains at this time.      SOB: No C/O SOB at this time.               LEG EDEMA:  B/L Lower extremity edema is present L > R. Better than before   PALPITATIONS: No C/O  of palpitations.   DIZZINESS: No C/O  Dizziness. SYNCOPE: None   OTHER/ ADDITIONAL COMPLAINTS:                                     HPI: Patient is here for F/U on his CAD, CVI, PAF- Arrhythmia, HTN & Dyslipidemia problems. CAD: Patient has known CAD. Had angioplasty in the past.  Arrhythmia: Patient has known  PAF. HTN: Patient has known essential HTN. Has been treated with guideline recommended medical / physical/ diet therapy as stated below. Dyslipidemia: Patient has known mixed dyslipidemia. Has been treated with guideline recommended medical / physical/ diet therapy as stated below. Current Outpatient Medications   Medication Sig Dispense Refill    amLODIPine (NORVASC) 5 MG tablet Take 1 tablet by mouth daily 30 tablet 5    atorvastatin (LIPITOR) 20 MG tablet TAKE ONE TABLET BY MOUTH DAILY 90 tablet 1    losartan (COZAAR) 100 MG tablet TAKE ONE TABLET BY MOUTH DAILY 90 tablet 1    levothyroxine (SYNTHROID) 75 MCG tablet TAKE ONE TABLET BY MOUTH DAILY 90 tablet 1    finasteride (PROSCAR) 5 MG tablet Take 5 mg by mouth daily      alfuzosin (UROXATRAL) 10 MG SR tablet Take 10 mg by mouth daily      aspirin 81 MG tablet Take 81 mg by mouth daily.  memantine (NAMENDA) 10 MG tablet Take 10 mg by mouth 2 times daily (Patient not taking: Reported on 1/5/2022)      dilTIAZem (CARDIZEM 12 HR) 120 MG extended release capsule Take 1 capsule by mouth every 12 hours as needed (palpitations and elevated HR.   Please check blood pressure at home before taking medication, do not take if systolic pressure (upper number) is below 100) (Patient not taking: Reported on 5/6/2022) 10 capsule 3    rivaroxaban (XARELTO) 20 MG TABS tablet Take 1 tablet by mouth every 24 hours 90 tablet 3     No current facility-administered medications for this visit. Allergies: Lisinopril and Pcn [penicillins]  Review of Systems:    Constitutional: Negative for diaphoresis and fatigue  Respiratory: Negative for shortness of breath  Cardiovascular: Negative for chest pain, dyspnea on exertion, claudication, edema, irregular heartbeat, murmur, palpitations or shortness of breath  Musculoskeletal: Negative for muscle pain, muscular weakness, negative for pain in arm and leg or swelling in foot and leg    Objective:  BP (!) 146/66   Pulse 52   Ht 5' 11\" (1.803 m)   Wt 191 lb (86.6 kg)   BMI 26.64 kg/m²   Wt Readings from Last 3 Encounters:   05/06/22 191 lb (86.6 kg)   01/05/22 188 lb (85.3 kg)   11/08/21 185 lb 6.4 oz (84.1 kg)     Body mass index is 26.64 kg/m². GENERAL - Alert, oriented, pleasant, in no apparent distress. EYES: No jaundice, no conjunctival pallor. Neck - Supple. No jugular venous distention noted. No carotid bruits. Cardiovascular - Normal S1 and S2 without obvious murmur or gallop. Extremities - No cyanosis, clubbing, or significant edema. Pulmonary - No respiratory distress. No wheezes or rales.       MEDICAL DECISION MAKING & DATA REVIEW:    Lab Review   Lab Results   Component Value Date    TROPONINT <0.010 10/10/2020    TROPONINT <0.010 01/10/2016     Lab Results   Component Value Date    PROBNP 393.0 10/10/2020    PROBNP 94.77 07/09/2014     Lab Results   Component Value Date    INR 1.13 11/12/2012     Lab Results   Component Value Date    LABA1C 6.2 12/20/2021    LABA1C 6.1 08/25/2021     Lab Results   Component Value Date    WBC 4.9 10/10/2020    WBC 5.5 08/25/2020    HCT 44.2 10/10/2020    HCT 41.0 (L) 08/25/2020    MCV 93.1 10/10/2020    MCV 94.9 08/25/2020     10/10/2020     08/25/2020     Lab Results   Component Value Date    CHOL 115 12/20/2021    CHOL 98 12/08/2020    TRIG 133 12/20/2021    TRIG 157 12/08/2020    HDL 26 (A) 12/20/2021    HDL 28 (A) 12/08/2020    LDLCALC 63 12/20/2021    LDLCALC 39 12/08/2020    LDLDIRECT 53 08/25/2020    LDLDIRECT 75 05/13/2013    CHOLHDLRATIO 115 12/20/2021     Lab Results   Component Value Date    ALT 15 12/20/2021    ALT 25 12/08/2020    AST 17 12/20/2021    AST 15 12/08/2020     BMP:    Lab Results   Component Value Date     12/20/2021     12/08/2020    K 4.0 12/20/2021    K 3.9 12/08/2020     12/20/2021     12/08/2020    CO2 26 12/20/2021    CO2 23 12/08/2020    BUN 19 12/08/2020    BUN 17 10/10/2020    CREATININE 1.0 12/20/2021    CREATININE 1.1 12/08/2020     CMP:   Lab Results   Component Value Date     12/20/2021     12/08/2020    K 4.0 12/20/2021    K 3.9 12/08/2020     12/20/2021     12/08/2020    CO2 26 12/20/2021    CO2 23 12/08/2020    BUN 19 12/08/2020    BUN 17 10/10/2020    CREATININE 1.0 12/20/2021    CREATININE 1.1 12/08/2020    PROT 6.8 10/10/2020    PROT 6.5 08/25/2020    PROT 6.9 12/09/2013     Lab Results   Component Value Date    TSH 2.662 12/20/2021    TSH 3.930 12/08/2020    TSHHS 3.180 10/10/2020    TSHHS 12.770 08/25/2020       QUALITY MEASURES REVIEWED:  1.CAD:Patient is taking anti platelet agent:Yes  2. DYSLIPIDEMIA: Patient is on cholesterol lowering medication:No   3. Beta-Blocker therapy for CAD, if prior Myocardial Infarction:No   4. Counselled regarding smoking cessation. No   Patient does not Smoke. 5.Anticoagulation therapy (for A.Fib) Yes     6. Discussed weight management strategies. Assessment & Plan:  Primary / Secondary prevention is the goal by aggressive risk modification, healthy and therapeutic life style changes for cardiovascular risk reduction.      CORONARY ARTERY DISEASE:Yes   S/P PTCA (percutaneous transluminal coronary angioplasty) 11/15/2012     stenting of 2 sites in RCA   Recent Cardiolite abnormality is not severe enough to warrant further testing.   clinically stable. Patient is on optimal medical regimen ( see medication list above )  -      Patient is currently  asymptomatic from CAD.          - changes in  treatment:   no, on ASA           - Testing ordered:  no  [facility] classification: 1     CARDIOLITE 10/2020    abnormal stress test, mildly depressed LVEF, Decreased uptake inferiorly due    to diaphragmatic artifact. Myocardial perfusion scan shows small size,    moderate intensity, partially reversible perfusion defect in inferior wall. HTN:well controlled on current medical regimen, see list above.              - changes in  treatment:   no, on Cozaar & Amlodipine   CARDIOMYOPATHY: None known   CONGESTIVE HEART FAILURE: NO KNOWN HISTORY.      VHD: No significant VHD noted     ECHO 8/2018   Left ventricular systolic function is normal with an ejection fraction of   55-60%.  Mild concentric left ventricular hypertrophy.   The left atrium is mildly dilated.   No significant valvular disease or diastolic dysfunction noted.   No evidence of pericardial effusion.     DYSLIPIDEMIA: Patient's profile is at / near [employer] [facility] is low                                    Tolerating current medical regimen wellyes, takes Lipitor                                                        See most recent Lab values in Labs section above.   Diabetes mellitis: .yes, Diet controlled.                                     Managed by family MD                                     BS under good control yes,                                      Hgb A1c avilable yes,   OTHER RELEVANT DIAGNOSIS:as noted in patient's active problem list:  CVI: S/P Left GSV ablation.    ARRHYTHMIAS:  known but no more C/O Palpitations                                Patient has H/O Atrial fibrillation                                He is rate controlled & on anticoagulation with Xarelto. .     TESTS ORDERED: none this visit     PREVIOUSLY ORDERED TESTS REVIEWED & DISCUSSED WITH THE PATIENT:     I personally reviewed & interpreted, all previously ordered tests as copied above. Latest Labs are pulled in to the note with dates. Labs, specially in Reference to Lipid profile, Cardiac testing in the form of Echo ( dated: ), stress tests ( dated: ) & other relevant cardiac testing reviewed with patient & recommendations made based on assessment of the results. Discussed role of Cardiac risk factors & effects + treatment of co morbidities with patient & advised accordingly. MEDICATIONS: List of medications patient is currently taking is reviewed in detail with the patient. Discussed any side effects or problems taking the medication. Recommend Continue present management & medications as listed. AFFIRMATION: I spent at least 20 minutes of time reviewing patient's history, previous & current medical problems & all Labs + testing. This includes chart prep even prior to the vosit. Various goals are discussed and multiple questions answered. Relevant concelling performed. Office follow up in six months.

## 2022-05-06 NOTE — LETTER
[facility] [address]  [address] [address]  Phone: [phone]  Fax: [phone]    Angelica Grady MD    May 6, 2022     Veronica Deirdre Aguero    Patient: Griselda Garcia   MR Number: [id number]   YOB: 1940   Date of Visit: 5/6/2022       Dear Veronica [facility]: Thank you for referring Alfredito Segal to me for evaluation/treatment. Below are the relevant portions of my assessment and plan of care. If you have questions, please do not hesitate to call me. I look forward to following Ayaz Waters along with you.     Sincerely,      Angelica Grady MD

## 2022-07-29 DIAGNOSIS — E03.9 ACQUIRED HYPOTHYROIDISM: ICD-10-CM

## 2022-07-29 RX ORDER — LEVOTHYROXINE SODIUM 0.07 MG/1
TABLET ORAL
Qty: 90 TABLET | Refills: 1 | OUTPATIENT
Start: 2022-07-29

## 2022-08-01 ENCOUNTER — OFFICE VISIT (OUTPATIENT)
Dept: CARDIOLOGY CLINIC | Age: 82
End: 2022-08-01
Payer: MEDICARE

## 2022-08-01 ENCOUNTER — NURSE ONLY (OUTPATIENT)
Dept: CARDIOLOGY CLINIC | Age: 82
End: 2022-08-01
Payer: MEDICARE

## 2022-08-01 VITALS
HEIGHT: 71 IN | WEIGHT: 189 LBS | SYSTOLIC BLOOD PRESSURE: 126 MMHG | BODY MASS INDEX: 26.46 KG/M2 | HEART RATE: 52 BPM | DIASTOLIC BLOOD PRESSURE: 64 MMHG

## 2022-08-01 DIAGNOSIS — I48.91 ATRIAL FIBRILLATION, UNSPECIFIED TYPE (HCC): Primary | ICD-10-CM

## 2022-08-01 DIAGNOSIS — I10 ESSENTIAL HYPERTENSION: Primary | ICD-10-CM

## 2022-08-01 DIAGNOSIS — R00.1 BRADYCARDIA: ICD-10-CM

## 2022-08-01 DIAGNOSIS — E11.9 TYPE 2 DIABETES MELLITUS WITHOUT COMPLICATION, WITHOUT LONG-TERM CURRENT USE OF INSULIN (HCC): ICD-10-CM

## 2022-08-01 DIAGNOSIS — I48.0 PAF (PAROXYSMAL ATRIAL FIBRILLATION) (HCC): ICD-10-CM

## 2022-08-01 DIAGNOSIS — I83.93 VARICOSE VEINS OF BOTH LOWER EXTREMITIES, UNSPECIFIED WHETHER COMPLICATED: ICD-10-CM

## 2022-08-01 DIAGNOSIS — E78.00 PURE HYPERCHOLESTEROLEMIA: ICD-10-CM

## 2022-08-01 DIAGNOSIS — I10 ESSENTIAL HYPERTENSION: ICD-10-CM

## 2022-08-01 DIAGNOSIS — I38 VHD (VALVULAR HEART DISEASE): ICD-10-CM

## 2022-08-01 PROCEDURE — 1123F ACP DISCUSS/DSCN MKR DOCD: CPT | Performed by: INTERNAL MEDICINE

## 2022-08-01 PROCEDURE — 99214 OFFICE O/P EST MOD 30 MIN: CPT | Performed by: INTERNAL MEDICINE

## 2022-08-01 PROCEDURE — 93242 EXT ECG>48HR<7D RECORDING: CPT | Performed by: INTERNAL MEDICINE

## 2022-08-01 NOTE — LETTER
[facility] [address]  [address] [address]  Phone: [phone]  Fax: [phone]    Brigitte Zuluaga MD    August 1, 2022     Ashley Galarza, [facility]    Patient: Joey Duarte   MR Number: [id number]   YOB: 1940   Date of Visit: 8/1/2022       Dear Ashley Galarza: Thank you for referring Elizabeth Decker to me for evaluation/treatment. Below are the relevant portions of my assessment and plan of care. If you have questions, please do not hesitate to call me. I look forward to following Phill Woodard along with you.     Sincerely,      Brigitte Zuluaga MD

## 2022-08-01 NOTE — PROGRESS NOTES
OFFICE PROGRESS NOTES      Sarah Trevizo is a 80 y.o. male who has    CHIEF COMPLAINT AS FOLLOWS:  CHEST PAIN: Patient denies any C/O chest pains at this time. SOB: No C/O SOB at this time. LEG EDEMA:  B/L Lower extremity edema is present L > R. Better than before   PALPITATIONS: No C/O  of palpitations. DIZZINESS: No C/O  Dizziness. SYNCOPE: None   OTHER/ ADDITIONAL COMPLAINTS: Patient was noted to be Bradycardic ( 46/min) in family MDs office. HPI: Patient is here for F/U on his CAD, CVI,PAF-Arrhythmia, HTN & Dyslipidemia problems. CAD: Patient has known CAD. Had angioplasty in the past.  Arrhythmia: Patient has known  PAF. HTN: Patient has known essential HTN. Has been treated with guideline recommended medical / physical/ diet therapy as stated below. Dyslipidemia: Patient has known mixed dyslipidemia. Has been treated with guideline recommended medical / physical/ diet therapy as stated below. Current Outpatient Medications   Medication Sig Dispense Refill    amLODIPine (NORVASC) 5 MG tablet Take 1 tablet by mouth daily 30 tablet 5    atorvastatin (LIPITOR) 20 MG tablet TAKE ONE TABLET BY MOUTH DAILY 90 tablet 1    losartan (COZAAR) 100 MG tablet TAKE ONE TABLET BY MOUTH DAILY 90 tablet 1    levothyroxine (SYNTHROID) 75 MCG tablet TAKE ONE TABLET BY MOUTH DAILY 90 tablet 1    finasteride (PROSCAR) 5 MG tablet Take 5 mg by mouth daily      rivaroxaban (XARELTO) 20 MG TABS tablet Take 1 tablet by mouth every 24 hours 90 tablet 3    alfuzosin (UROXATRAL) 10 MG SR tablet Take 10 mg by mouth daily      aspirin 81 MG tablet Take 81 mg by mouth daily. memantine (NAMENDA) 10 MG tablet Take 10 mg by mouth 2 times daily (Patient not taking: No sig reported)      dilTIAZem (CARDIZEM 12 HR) 120 MG extended release capsule Take 1 capsule by mouth every 12 hours as needed (palpitations and elevated HR.   Please check blood pressure at home before taking medication, do not take if systolic pressure (upper number) is below 100) (Patient not taking: No sig reported) 10 capsule 3     No current facility-administered medications for this visit. Allergies: Lisinopril and Pcn [penicillins]  Review of Systems:    Constitutional: Negative for diaphoresis and fatigue  Respiratory: Negative for shortness of breath  Cardiovascular: Negative for chest pain, dyspnea on exertion, claudication, edema, irregular heartbeat, murmur, palpitations or shortness of breath  Musculoskeletal: Negative for muscle pain, muscular weakness, negative for pain in arm and leg or swelling in foot and leg    Objective:  /64   Pulse 52   Ht 5' 11\" (1.803 m)   Wt 189 lb (85.7 kg)   BMI 26.36 kg/m²   Wt Readings from Last 3 Encounters:   08/01/22 189 lb (85.7 kg)   05/06/22 191 lb (86.6 kg)   01/05/22 188 lb (85.3 kg)     Body mass index is 26.36 kg/m². GENERAL - Alert, oriented, pleasant, in no apparent distress. EYES: No jaundice, no conjunctival pallor. Neck - Supple. No jugular venous distention noted. No carotid bruits. Cardiovascular - IRR,  S1 is variable and S2 without obvious murmur or gallop. Extremities - No cyanosis, clubbing, or significant edema. Pulmonary - No respiratory distress. No wheezes or rales.       MEDICAL DECISION MAKING & DATA REVIEW:    Lab Review   Lab Results   Component Value Date/Time    TROPONINT <0.010 10/10/2020 09:32 AM    TROPONINT <0.010 01/10/2016 08:15 AM     Lab Results   Component Value Date/Time    PROBNP 393.0 10/10/2020 09:32 AM    PROBNP 94.77 07/09/2014 09:20 PM     Lab Results   Component Value Date    INR 1.13 11/12/2012     Lab Results   Component Value Date    LABA1C 6.2 12/20/2021    LABA1C 6.1 08/25/2021     Lab Results   Component Value Date    WBC 4.9 10/10/2020    WBC 5.5 08/25/2020    HCT 44.2 10/10/2020    HCT 41.0 (L) 08/25/2020    MCV 93.1 10/10/2020    MCV 94.9 08/25/2020     10/10/2020     08/25/2020     Lab Results   Component Value Date    CHOL 115 12/20/2021    CHOL 98 12/08/2020    TRIG 133 12/20/2021    TRIG 157 12/08/2020    HDL 26 (A) 12/20/2021    HDL 28 (A) 12/08/2020    LDLCALC 63 12/20/2021    LDLCALC 39 12/08/2020    LDLDIRECT 53 08/25/2020    LDLDIRECT 75 05/13/2013    CHOLHDLRATIO 115 12/20/2021     Lab Results   Component Value Date    ALT 15 12/20/2021    ALT 25 12/08/2020    AST 17 12/20/2021    AST 15 12/08/2020     BMP:    Lab Results   Component Value Date/Time     12/20/2021 12:00 AM     12/08/2020 12:00 AM    K 4.0 12/20/2021 12:00 AM    K 3.9 12/08/2020 12:00 AM     12/20/2021 12:00 AM     12/08/2020 12:00 AM    CO2 26 12/20/2021 12:00 AM    CO2 23 12/08/2020 12:00 AM    BUN 19 12/08/2020 12:00 AM    BUN 17 10/10/2020 09:32 AM    CREATININE 1.0 12/20/2021 12:00 AM    CREATININE 1.1 12/08/2020 12:00 AM     CMP:   Lab Results   Component Value Date/Time     12/20/2021 12:00 AM     12/08/2020 12:00 AM    K 4.0 12/20/2021 12:00 AM    K 3.9 12/08/2020 12:00 AM     12/20/2021 12:00 AM     12/08/2020 12:00 AM    CO2 26 12/20/2021 12:00 AM    CO2 23 12/08/2020 12:00 AM    BUN 19 12/08/2020 12:00 AM    BUN 17 10/10/2020 09:32 AM    CREATININE 1.0 12/20/2021 12:00 AM    CREATININE 1.1 12/08/2020 12:00 AM    PROT 6.8 10/10/2020 09:32 AM    PROT 6.5 08/25/2020 08:22 AM    PROT 6.9 12/09/2013 12:00 AM     Lab Results   Component Value Date/Time    TSH 2.662 12/20/2021 12:00 AM    TSH 3.930 12/08/2020 12:00 AM    TSHHS 3.180 10/10/2020 09:32 AM    TSHHS 12.770 08/25/2020 08:22 AM     QUALITY MEASURES REVIEWED:  1.CAD:Patient is taking anti platelet agent:Yes  2. DYSLIPIDEMIA: Patient is on cholesterol lowering medication:Yes   3. Beta-Blocker therapy for CAD, if prior Myocardial Infarction:No   4. Counselled regarding smoking cessation. No   Patient does not Smoke. 5.Anticoagulation therapy (for A.Fib) Yes  6. Discussed weight management strategies. Assessment & Plan:  Primary / Secondary prevention is the goal by aggressive risk modification, healthy and therapeutic life style changes for cardiovascular risk reduction. CORONARY ARTERY DISEASE:Yes   S/P PTCA (percutaneous transluminal coronary angioplasty) 11/15/2012     stenting of 2 sites in RCA   Recent Cardiolite abnormality is not severe enough to warrant further testing. clinically stable. Patient is on optimal medical regimen ( see medication list above )  -      Patient is currently  asymptomatic from CAD. - changes in  treatment:   no, on ASA           - Testing ordered:  no  [facility] classification: 1     CARDIOLITE 10/2020    abnormal stress test, mildly depressed LVEF, Decreased uptake inferiorly due    to diaphragmatic artifact. Myocardial perfusion scan shows small size,    moderate intensity, partially reversible perfusion defect in inferior wall. HTN:well controlled on current medical regimen, see list above. - changes in  treatment:   no, on Cozaar & Amlodipine   CARDIOMYOPATHY: None known   CONGESTIVE HEART FAILURE: NO KNOWN HISTORY.      VHD: No significant VHD noted     ECHO 8/2018   Left ventricular systolic function is normal with an ejection fraction of   55-60%. Mild concentric left ventricular hypertrophy. The left atrium is mildly dilated. No significant valvular disease or diastolic dysfunction noted. No evidence of pericardial effusion. DYSLIPIDEMIA: Patient's profile is at / near Goal.yes,                                 HDL is low                                    Tolerating current medical regimen wellyes, takes Lipitor                                                        See most recent Lab values in Labs section above. Diabetes mellitis: .yes, Diet controlled.                                      Managed by family MD                                     BS under good control yes, Hgb A1c avilable yes,   OTHER RELEVANT DIAGNOSIS:as noted in patient's active problem list:  CVI: S/P Left GSV ablation. ARRHYTHMIAS:  known but no more C/O Palpitations                                Patient has H/O Atrial fibrillation                        on anticoagulation with Xarelto. Bradycardia ? Developing SSS but patient is not symptomatic. TESTS ORDERED: Check Holter. PREVIOUSLY ORDERED TESTS REVIEWED & DISCUSSED WITH THE PATIENT:     I personally reviewed & interpreted, all previously ordered tests as copied above. Latest Labs are pulled in to the note with dates. Labs, specially in Reference to Lipid profile, Cardiac testing in the form of Echo ( dated: ), stress tests ( dated: ) & other relevant cardiac testing reviewed with patient & recommendations made based on assessment of the results. Discussed role of Cardiac risk factors & effects + treatment of co morbidities with patient & advised accordingly. MEDICATIONS: List of medications patient is currently taking is reviewed in detail with the patient & family member present. Discussed any side effects or problems taking the medication. Recommend Continue present management & medications as listed. AFFIRMATION: I reviewed patient's history, previous & current medical problems & all Labs + testing. This includes chart prep even prior to the vosit. Various goals are discussed and multiple questions answered. Relevant concelling performed. Office follow up for test results.

## 2022-08-03 ENCOUNTER — TELEPHONE (OUTPATIENT)
Dept: NEUROLOGY | Age: 82
End: 2022-08-03

## 2022-08-03 NOTE — TELEPHONE ENCOUNTER
Received a referral from Rory Varghese for pt to be evaluated for possible autism spectrum disorder. After speaking with our providers this is not a dx we treat. I have reached out to Dr. Rosalee Gates office to make them aware.

## 2022-08-07 DIAGNOSIS — E03.9 ACQUIRED HYPOTHYROIDISM: ICD-10-CM

## 2022-08-08 RX ORDER — LEVOTHYROXINE SODIUM 0.07 MG/1
TABLET ORAL
Qty: 90 TABLET | Refills: 1 | OUTPATIENT
Start: 2022-08-08

## 2022-08-29 ENCOUNTER — TELEPHONE (OUTPATIENT)
Dept: CARDIOLOGY CLINIC | Age: 82
End: 2022-08-29

## 2022-09-19 PROCEDURE — 93244 EXT ECG>48HR<7D REV&INTERPJ: CPT | Performed by: INTERNAL MEDICINE

## 2022-10-05 ENCOUNTER — OFFICE VISIT (OUTPATIENT)
Dept: CARDIOLOGY CLINIC | Age: 82
End: 2022-10-05
Payer: MEDICARE

## 2022-10-05 VITALS
DIASTOLIC BLOOD PRESSURE: 60 MMHG | BODY MASS INDEX: 26.49 KG/M2 | HEART RATE: 68 BPM | SYSTOLIC BLOOD PRESSURE: 110 MMHG | WEIGHT: 189.2 LBS | HEIGHT: 71 IN

## 2022-10-05 DIAGNOSIS — I10 ESSENTIAL HYPERTENSION: ICD-10-CM

## 2022-10-05 DIAGNOSIS — I25.119 CORONARY ARTERY DISEASE INVOLVING NATIVE CORONARY ARTERY OF NATIVE HEART WITH ANGINA PECTORIS (HCC): Primary | ICD-10-CM

## 2022-10-05 DIAGNOSIS — I38 VHD (VALVULAR HEART DISEASE): ICD-10-CM

## 2022-10-05 DIAGNOSIS — I83.93 VARICOSE VEINS OF BOTH LOWER EXTREMITIES, UNSPECIFIED WHETHER COMPLICATED: ICD-10-CM

## 2022-10-05 DIAGNOSIS — E11.9 TYPE 2 DIABETES MELLITUS WITHOUT COMPLICATION, WITHOUT LONG-TERM CURRENT USE OF INSULIN (HCC): ICD-10-CM

## 2022-10-05 DIAGNOSIS — E78.00 PURE HYPERCHOLESTEROLEMIA: ICD-10-CM

## 2022-10-05 DIAGNOSIS — I48.0 PAF (PAROXYSMAL ATRIAL FIBRILLATION) (HCC): ICD-10-CM

## 2022-10-05 DIAGNOSIS — R00.1 BRADYCARDIA: ICD-10-CM

## 2022-10-05 PROCEDURE — 1123F ACP DISCUSS/DSCN MKR DOCD: CPT | Performed by: INTERNAL MEDICINE

## 2022-10-05 PROCEDURE — 99213 OFFICE O/P EST LOW 20 MIN: CPT | Performed by: INTERNAL MEDICINE

## 2022-10-05 NOTE — PATIENT INSTRUCTIONS
CORONARY ARTERY DISEASE::Yes   S/P PTCA (percutaneous transluminal coronary angioplasty) 11/15/2012     stenting of 2 sites in RCA   Recent Cardiolite abnormality is not severe enough to warrant further testing. clinically stable. Patient is on optimal medical regimen ( see medication list above )  -      Patient is currently  asymptomatic from CAD. - changes in  treatment:   no, on ASA           - Testing ordered:  no  [facility] classification: 1     CARDIOLITE 10/2020    abnormal stress test, mildly depressed LVEF, Decreased uptake inferiorly due    to diaphragmatic artifact. Myocardial perfusion scan shows small size,    moderate intensity, partially reversible perfusion defect in inferior wall. HTN:well controlled on current medical regimen, see list above. - changes in  treatment:   no, on Cozaar & Amlodipine   CARDIOMYOPATHY: None known   CONGESTIVE HEART FAILURE: NO KNOWN HISTORY.      VHD: No significant VHD noted     ECHO 8/2018   Left ventricular systolic function is normal with an ejection fraction of   55-60%. Mild concentric left ventricular hypertrophy. The left atrium is mildly dilated. No significant valvular disease or diastolic dysfunction noted. No evidence of pericardial effusion. DYSLIPIDEMIA: Patient's profile is at / near Goal.yes,                                 HDL is low                                    Tolerating current medical regimen wellyes, takes Lipitor                                                        See most recent Lab values in Labs section above. Diabetes mellitis: .yes, Diet controlled. Managed by family MD                                     BS under good control yes,                                      Hgb A1c avilable yes,   OTHER RELEVANT DIAGNOSIS:as noted in patient's active problem list:  CVI: S/P Left GSV ablation.     ARRHYTHMIAS:  known but no more C/O Palpitations Patient has H/O Atrial fibrillation                        on anticoagulation with Xarelto. Bradycardia ? Developing SSS but patient is not symptomatic. 8/1/2022  Baseline rhythm is normal sinus. Significant Bradycardias noted are seen during sleep hours. Clinical co-relation recommended. Patient is not having near syncope or Syncope problem. TESTS ORDERED:none this visit     PREVIOUSLY ORDERED TESTS REVIEWED & DISCUSSED WITH THE PATIENT:     I personally reviewed & interpreted, all previously ordered tests as copied above. Latest Labs are pulled in to the note with dates. Labs, specially in Reference to Lipid profile, Cardiac testing in the form of Echo ( dated: ), stress tests ( dated: ) & other relevant cardiac testing reviewed with patient & recommendations made based on assessment of the results. Discussed role of Cardiac risk factors & effects + treatment of co morbidities with patient & advised accordingly. MEDICATIONS: List of medications patient is currently taking is reviewed in detail with the patient . Discussed any side effects or problems taking the medication. Recommend Continue present management & medications as listed. AFFIRMATION: I spent at least 20 minutes of time reviewing patient's history, previous & current medical problems & all Labs + testing. This includes chart prep even prior to the vosit. Various goals are discussed and multiple questions answered. Relevant concelling performed. Office follow up in six months.

## 2022-10-05 NOTE — PROGRESS NOTES
OFFICE PROGRESS NOTES      Kimberly Desai is a 80 y.o. male who has    CHIEF COMPLAINT AS FOLLOWS:  CHEST PAIN: Patient denies any C/O chest pains at this time. SOB: No C/O SOB at this time. LEG EDEMA: No leg edema   PALPITATIONS: C/O brief episodes of palpitations, which are not frequent. DIZZINESS: No C/O Dizziness   SYNCOPE: None   OTHER/ ADDITIONAL COMPLAINTS:                                     HPI: Patient is here for F/U on his CAD, CVI, PAF- Arrhythmia, HTN & Dyslipidemia problems. CAD: Patient has known CAD. Had angioplasty in the past.  Arrhythmia: Patient has known PAF. HTN: Patient has known essential HTN. Has been treated with guideline recommended medical / physical/ diet therapy as stated below. Dyslipidemia: Patient has known mixed dyslipidemia. Has been treated with guideline recommended medical / physical/ diet therapy as stated below. Current Outpatient Medications   Medication Sig Dispense Refill    amLODIPine (NORVASC) 5 MG tablet Take 1 tablet by mouth daily 30 tablet 5    losartan (COZAAR) 100 MG tablet TAKE ONE TABLET BY MOUTH DAILY 90 tablet 1    levothyroxine (SYNTHROID) 75 MCG tablet TAKE ONE TABLET BY MOUTH DAILY 90 tablet 1    dilTIAZem (CARDIZEM 12 HR) 120 MG extended release capsule Take 1 capsule by mouth every 12 hours as needed (palpitations and elevated HR. Please check blood pressure at home before taking medication, do not take if systolic pressure (upper number) is below 100) 10 capsule 3    finasteride (PROSCAR) 5 MG tablet Take 5 mg by mouth daily      alfuzosin (UROXATRAL) 10 MG SR tablet Take 10 mg by mouth daily      aspirin 81 MG tablet Take 81 mg by mouth daily.         atorvastatin (LIPITOR) 20 MG tablet TAKE ONE TABLET BY MOUTH DAILY 90 tablet 1    memantine (NAMENDA) 10 MG tablet Take 10 mg by mouth 2 times daily (Patient not taking: No sig reported)      rivaroxaban (XARELTO) 20 MG TABS tablet Take 1 tablet by mouth every 24 hours 90 tablet 3     No current facility-administered medications for this visit. Allergies: Lisinopril and Pcn [penicillins]  Review of Systems:    Constitutional: Negative for diaphoresis and fatigue  Respiratory: Negative for shortness of breath  Cardiovascular: Negative for chest pain, dyspnea on exertion, claudication, edema, irregular heartbeat, murmur, palpitations or shortness of breath  Musculoskeletal: Negative for muscle pain, muscular weakness, negative for pain in arm and leg or swelling in foot and leg    Objective:  /60   Pulse 68   Ht 5' 11\" (1.803 m)   Wt 189 lb 3.2 oz (85.8 kg)   BMI 26.39 kg/m²   Wt Readings from Last 3 Encounters:   10/05/22 189 lb 3.2 oz (85.8 kg)   08/01/22 189 lb (85.7 kg)   05/06/22 191 lb (86.6 kg)     Body mass index is 26.39 kg/m². GENERAL - Alert, oriented, pleasant, in no apparent distress. EYES: No jaundice, no conjunctival pallor. Neck - Supple. No jugular venous distention noted. No carotid bruits. Cardiovascular - Normal S1 and S2 without obvious murmur or gallop. Extremities - No cyanosis, clubbing, or significant edema. Pulmonary - No respiratory distress. No wheezes or rales.       MEDICAL DECISION MAKING & DATA REVIEW:    Lab Review   Lab Results   Component Value Date/Time    TROPONINT <0.010 10/10/2020 09:32 AM    TROPONINT <0.010 01/10/2016 08:15 AM     Lab Results   Component Value Date/Time    PROBNP 393.0 10/10/2020 09:32 AM    PROBNP 94.77 07/09/2014 09:20 PM     Lab Results   Component Value Date    INR 1.13 11/12/2012     Lab Results   Component Value Date    LABA1C 6.2 12/20/2021    LABA1C 6.1 08/25/2021     Lab Results   Component Value Date    WBC 4.9 10/10/2020    WBC 5.5 08/25/2020    HCT 44.2 10/10/2020    HCT 41.0 (L) 08/25/2020    MCV 93.1 10/10/2020    MCV 94.9 08/25/2020     10/10/2020     08/25/2020     Lab Results   Component Value Date    CHOL 115 12/20/2021    CHOL 98 12/08/2020    TRIG 133 12/20/2021    TRIG 157 12/08/2020    HDL 26 (A) 12/20/2021    HDL 28 (A) 12/08/2020    LDLCALC 63 12/20/2021    LDLCALC 39 12/08/2020    LDLDIRECT 53 08/25/2020    LDLDIRECT 75 05/13/2013    CHOLHDLRATIO 115 12/20/2021     Lab Results   Component Value Date    ALT 15 12/20/2021    ALT 25 12/08/2020    AST 17 12/20/2021    AST 15 12/08/2020     BMP:    Lab Results   Component Value Date/Time     12/20/2021 12:00 AM     12/08/2020 12:00 AM    K 4.0 12/20/2021 12:00 AM    K 3.9 12/08/2020 12:00 AM     12/20/2021 12:00 AM     12/08/2020 12:00 AM    CO2 26 12/20/2021 12:00 AM    CO2 23 12/08/2020 12:00 AM    BUN 19 12/08/2020 12:00 AM    BUN 17 10/10/2020 09:32 AM    CREATININE 1.0 12/20/2021 12:00 AM    CREATININE 1.1 12/08/2020 12:00 AM     CMP:   Lab Results   Component Value Date/Time     12/20/2021 12:00 AM     12/08/2020 12:00 AM    K 4.0 12/20/2021 12:00 AM    K 3.9 12/08/2020 12:00 AM     12/20/2021 12:00 AM     12/08/2020 12:00 AM    CO2 26 12/20/2021 12:00 AM    CO2 23 12/08/2020 12:00 AM    BUN 19 12/08/2020 12:00 AM    BUN 17 10/10/2020 09:32 AM    CREATININE 1.0 12/20/2021 12:00 AM    CREATININE 1.1 12/08/2020 12:00 AM    PROT 6.8 10/10/2020 09:32 AM    PROT 6.5 08/25/2020 08:22 AM    PROT 6.9 12/09/2013 12:00 AM     Lab Results   Component Value Date/Time    TSH 2.662 12/20/2021 12:00 AM    TSH 3.930 12/08/2020 12:00 AM    TSHHS 3.180 10/10/2020 09:32 AM    TSHHS 12.770 08/25/2020 08:22 AM       QUALITY MEASURES REVIEWED:  1.CAD:Patient is taking anti platelet agent:Yes  2. DYSLIPIDEMIA: Patient is on cholesterol lowering medication:Yes   3. Beta-Blocker therapy for CAD, if prior Myocardial Infarction:No   4. Counselled regarding smoking cessation. No   Patient does not Smoke. 5.Anticoagulation therapy (for A.Fib) Yes   Does Not have A.Fib.  6.Discussed weight management strategies.     Assessment & Plan:  Primary / Secondary prevention is the goal by aggressive risk modification, healthy and therapeutic life style changes for cardiovascular risk reduction. CORONARY ARTERY DISEASE::Yes   S/P PTCA (percutaneous transluminal coronary angioplasty) 11/15/2012     stenting of 2 sites in RCA   Recent Cardiolite abnormality is not severe enough to warrant further testing. clinically stable. Patient is on optimal medical regimen ( see medication list above )  -      Patient is currently  asymptomatic from CAD. - changes in  treatment:   no, on ASA           - Testing ordered:  no  [facility] classification: 1     CARDIOLITE 10/2020    abnormal stress test, mildly depressed LVEF, Decreased uptake inferiorly due    to diaphragmatic artifact. Myocardial perfusion scan shows small size,    moderate intensity, partially reversible perfusion defect in inferior wall. HTN:well controlled on current medical regimen, see list above. - changes in  treatment:   no, on Cozaar & Amlodipine   CARDIOMYOPATHY: None known   CONGESTIVE HEART FAILURE: NO KNOWN HISTORY.      VHD: No significant VHD noted     ECHO 8/2018   Left ventricular systolic function is normal with an ejection fraction of   55-60%. Mild concentric left ventricular hypertrophy. The left atrium is mildly dilated. No significant valvular disease or diastolic dysfunction noted. No evidence of pericardial effusion. DYSLIPIDEMIA: Patient's profile is at / near Goal.yes,                                 HDL is low                                    Tolerating current medical regimen wellyes, takes Lipitor                                                        See most recent Lab values in Labs section above. Diabetes mellitis: .yes, Diet controlled.                                      Managed by family MD                                     BS under good control yes,                                      Hgb A1c avilable yes,   OTHER RELEVANT DIAGNOSIS:as noted in patient's active problem list:  CVI: S/P Left GSV ablation. ARRHYTHMIAS:  known but no more C/O Palpitations                                Patient has H/O Atrial fibrillation                        on anticoagulation with Xarelto. Bradycardia ? Developing SSS but patient is not symptomatic. 8/1/2022  Baseline rhythm is normal sinus. Significant Bradycardias noted are seen during sleep hours. Clinical co-relation recommended. Patient is not having near syncope or Syncope problem. TESTS ORDERED:none this visit     PREVIOUSLY ORDERED TESTS REVIEWED & DISCUSSED WITH THE PATIENT:     I personally reviewed & interpreted, all previously ordered tests as copied above. Latest Labs are pulled in to the note with dates. Labs, specially in Reference to Lipid profile, Cardiac testing in the form of Echo ( dated: ), stress tests ( dated: ) & other relevant cardiac testing reviewed with patient & recommendations made based on assessment of the results. Discussed role of Cardiac risk factors & effects + treatment of co morbidities with patient & advised accordingly. MEDICATIONS: List of medications patient is currently taking is reviewed in detail with the patient . Discussed any side effects or problems taking the medication. Recommend Continue present management & medications as listed. AFFIRMATION: I spent at least 20 minutes of time reviewing patient's history, previous & current medical problems & all Labs + testing. This includes chart prep even prior to the vosit. Various goals are discussed and multiple questions answered. Relevant concelling performed. Office follow up in six months.

## 2022-11-21 DIAGNOSIS — I10 ESSENTIAL HYPERTENSION: ICD-10-CM

## 2022-11-21 RX ORDER — ATORVASTATIN CALCIUM 20 MG/1
20 TABLET, FILM COATED ORAL DAILY
Qty: 90 TABLET | Refills: 3 | Status: SHIPPED | OUTPATIENT
Start: 2022-11-21

## 2023-01-25 DIAGNOSIS — I10 ESSENTIAL HYPERTENSION: ICD-10-CM

## 2023-01-25 RX ORDER — AMLODIPINE BESYLATE 5 MG/1
5 TABLET ORAL DAILY
Qty: 30 TABLET | Refills: 5 | Status: SHIPPED | OUTPATIENT
Start: 2023-01-25

## 2023-01-25 RX ORDER — LOSARTAN POTASSIUM 100 MG/1
100 TABLET ORAL DAILY
Qty: 90 TABLET | Refills: 1 | Status: SHIPPED | OUTPATIENT
Start: 2023-01-25

## 2023-01-30 RX ORDER — AMLODIPINE BESYLATE 5 MG/1
5 TABLET ORAL DAILY
Qty: 30 TABLET | Refills: 5 | Status: SHIPPED | OUTPATIENT
Start: 2023-01-30

## 2023-04-28 ENCOUNTER — PROCEDURE VISIT (OUTPATIENT)
Dept: CARDIOLOGY CLINIC | Age: 83
End: 2023-04-28

## 2023-04-28 DIAGNOSIS — I10 ESSENTIAL HYPERTENSION: ICD-10-CM

## 2023-04-28 DIAGNOSIS — I48.0 PAF (PAROXYSMAL ATRIAL FIBRILLATION) (HCC): ICD-10-CM

## 2023-04-28 DIAGNOSIS — E11.9 TYPE 2 DIABETES MELLITUS WITHOUT COMPLICATION, WITHOUT LONG-TERM CURRENT USE OF INSULIN (HCC): ICD-10-CM

## 2023-04-28 DIAGNOSIS — I38 VHD (VALVULAR HEART DISEASE): ICD-10-CM

## 2023-04-28 DIAGNOSIS — E78.00 PURE HYPERCHOLESTEROLEMIA: ICD-10-CM

## 2023-04-28 DIAGNOSIS — I25.119 CORONARY ARTERY DISEASE INVOLVING NATIVE CORONARY ARTERY OF NATIVE HEART WITH ANGINA PECTORIS (HCC): ICD-10-CM

## 2023-04-28 DIAGNOSIS — I83.93 VARICOSE VEINS OF BOTH LOWER EXTREMITIES, UNSPECIFIED WHETHER COMPLICATED: ICD-10-CM

## 2023-04-28 LAB
LV EF: 48 %
LVEF MODALITY: NORMAL

## 2023-05-01 ENCOUNTER — TELEPHONE (OUTPATIENT)
Dept: CARDIOLOGY CLINIC | Age: 83
End: 2023-05-01

## 2023-05-01 NOTE — TELEPHONE ENCOUNTER
Echo   Left ventricular function is mildly abnormal, EF 45-50%. Mildly dilated bilateral ventricles. Mildly dilated bilateral atria. Moderate mitral and mild to moderate tricuspid regurgitation is present. Mildly elevated pulmonary artery pressure, RVSP 36 mmHg. Dilation of the aotic root (3.9cm).    No evidence of pericardial effusion    Patient want to keep appt as is     Patient verbally understood

## 2023-09-13 DIAGNOSIS — I10 ESSENTIAL HYPERTENSION: ICD-10-CM

## 2023-09-19 RX ORDER — AMLODIPINE BESYLATE 5 MG/1
5 TABLET ORAL DAILY
Qty: 90 TABLET | Refills: 1 | Status: SHIPPED | OUTPATIENT
Start: 2023-09-19

## 2023-09-19 RX ORDER — ATORVASTATIN CALCIUM 20 MG/1
20 TABLET, FILM COATED ORAL DAILY
Qty: 90 TABLET | Refills: 1 | Status: SHIPPED | OUTPATIENT
Start: 2023-09-19

## 2023-10-13 ENCOUNTER — OFFICE VISIT (OUTPATIENT)
Dept: CARDIOLOGY CLINIC | Age: 83
End: 2023-10-13
Payer: MEDICARE

## 2023-10-13 VITALS
HEIGHT: 71 IN | SYSTOLIC BLOOD PRESSURE: 138 MMHG | WEIGHT: 188 LBS | BODY MASS INDEX: 26.32 KG/M2 | HEART RATE: 93 BPM | DIASTOLIC BLOOD PRESSURE: 80 MMHG

## 2023-10-13 DIAGNOSIS — I38 VHD (VALVULAR HEART DISEASE): ICD-10-CM

## 2023-10-13 DIAGNOSIS — I83.93 VARICOSE VEINS OF BOTH LOWER EXTREMITIES, UNSPECIFIED WHETHER COMPLICATED: ICD-10-CM

## 2023-10-13 DIAGNOSIS — I10 ESSENTIAL HYPERTENSION: Primary | ICD-10-CM

## 2023-10-13 DIAGNOSIS — E78.00 PURE HYPERCHOLESTEROLEMIA: ICD-10-CM

## 2023-10-13 DIAGNOSIS — I25.119 CORONARY ARTERY DISEASE INVOLVING NATIVE CORONARY ARTERY OF NATIVE HEART WITH ANGINA PECTORIS (HCC): ICD-10-CM

## 2023-10-13 DIAGNOSIS — E11.9 TYPE 2 DIABETES MELLITUS WITHOUT COMPLICATION, WITHOUT LONG-TERM CURRENT USE OF INSULIN (HCC): ICD-10-CM

## 2023-10-13 DIAGNOSIS — I48.0 PAF (PAROXYSMAL ATRIAL FIBRILLATION) (HCC): ICD-10-CM

## 2023-10-13 PROCEDURE — 99214 OFFICE O/P EST MOD 30 MIN: CPT | Performed by: INTERNAL MEDICINE

## 2023-10-13 PROCEDURE — 3079F DIAST BP 80-89 MM HG: CPT | Performed by: INTERNAL MEDICINE

## 2023-10-13 PROCEDURE — 1123F ACP DISCUSS/DSCN MKR DOCD: CPT | Performed by: INTERNAL MEDICINE

## 2023-10-13 PROCEDURE — 3075F SYST BP GE 130 - 139MM HG: CPT | Performed by: INTERNAL MEDICINE

## 2023-10-13 PROCEDURE — 93000 ELECTROCARDIOGRAM COMPLETE: CPT | Performed by: INTERNAL MEDICINE

## 2023-10-13 RX ORDER — CARVEDILOL 12.5 MG/1
12.5 TABLET ORAL 2 TIMES DAILY WITH MEALS
Qty: 60 TABLET | Refills: 5 | Status: SHIPPED | OUTPATIENT
Start: 2023-10-13

## 2023-10-13 RX ORDER — METOPROLOL SUCCINATE 50 MG/1
50 TABLET, EXTENDED RELEASE ORAL DAILY
Qty: 30 TABLET | Refills: 3 | Status: SHIPPED | OUTPATIENT
Start: 2023-10-13 | End: 2023-10-13

## 2023-10-13 NOTE — PATIENT INSTRUCTIONS
CORONARY ARTERY DISEASE:Yes   S/P PTCA (percutaneous transluminal coronary angioplasty) 11/15/2012     stenting of 2 sites in RCA   Recent Cardiolite abnormality is not severe enough to warrant further testing. clinically stable. Patient is on optimal medical regimen ( see medication list above )  -      Patient is currently  asymptomatic from CAD. - changes in  treatment:   no, on ASA           - Testing ordered:  no  [facility] classification: 1     CARDIOLITE 10/2020    abnormal stress test, mildly depressed LVEF, Decreased uptake inferiorly due    to diaphragmatic artifact. Myocardial perfusion scan shows small size,    moderate intensity, partially reversible perfusion defect in inferior wall. HTN:well controlled on current medical regimen, see list above. - changes in  treatment:   no, on Cozaar & Amlodipine   CARDIOMYOPATHY: None known   CONGESTIVE HEART FAILURE: NO KNOWN HISTORY.        VHD: Moderate MR   ECHO 4/28/2023   Left ventricular function is mildly abnormal, EF 45-50%. Mildly dilated bilateral ventricles. Mildly dilated bilateral atria. Moderate mitral and mild to moderate tricuspid regurgitation is present. Mildly elevated pulmonary artery pressure, RVSP 36 mmHg. Dilation of the aotic root (3.9cm). No evidence of pericardial effusion. DYSLIPIDEMIA: Patient's profile is at / near Goal.yes,                                 HDL is low                                    Tolerating current medical regimen wellyes, takes Lipitor                                                        See most recent Lab values in Labs section above. Diabetes mellitis: .yes, Diet controlled. Managed by family MD                                     BS under good control yes,                                      Hgb A1c avilable yes,     OTHER RELEVANT DIAGNOSIS:as noted in patient's active problem list:  CVI: S/P Left GSV ablation.

## 2023-10-13 NOTE — PROGRESS NOTES
MD                                     BS under good control yes,                                      Hgb A1c avilable yes,     OTHER RELEVANT DIAGNOSIS:as noted in patient's active problem list:  CVI: S/P Left GSV ablation. ARRHYTHMIAS:  known but no more C/O Palpitations                                Patient has H/O Atrial fibrillation                        on anticoagulation with Xarelto. EKG: today, Atrial fibrillation 93 /min     Bradycardia ? Developing SSS but patient is not symptomatic. 8/1/2022  Baseline rhythm is normal sinus. Significant Bradycardias noted are seen during sleep hours. Clinical co-relation recommended. Patient is not having near syncope or Syncope problem. TESTS ORDERED: none this visit     PREVIOUSLY ORDERED TESTS REVIEWED & DISCUSSED WITH THE PATIENT:     I personally reviewed & interpreted, all previously ordered tests as copied above. Latest Labs are pulled in to the note with dates. Labs, specially in Reference to Lipid profile, Cardiac testing in the form of Echo ( dated: ), stress tests ( dated: ) & other relevant cardiac testing reviewed with patient & recommendations made based on assessment of the results. Discussed role of Cardiac risk factors & effects + treatment of co morbidities with patient & advised accordingly. MEDICATIONS: List of medications patient is currently taking is reviewed in detail with the patient . Discussed any side effects or problems taking the medication. Recommend: discontinue Amlodipine. Do not take Diltiazem. Take Coreg 12.5 mg BID. Patient to see Bk [place]    AFFIRMATION: I  reviewed patient's history, previous & current medical problems & all Labs + testing. This includes chart prep even prior to the vosit. Various goals are discussed and multiple questions answered. Relevant concelling performed. Office follow up in 3 months.

## 2023-10-17 RX ORDER — CARVEDILOL 6.25 MG/1
6.25 TABLET ORAL 2 TIMES DAILY WITH MEALS
Qty: 180 TABLET | Refills: 1 | Status: SHIPPED | OUTPATIENT
Start: 2023-10-17

## 2023-10-17 RX ORDER — LOSARTAN POTASSIUM AND HYDROCHLOROTHIAZIDE 25; 100 MG/1; MG/1
1 TABLET ORAL DAILY
Qty: 90 TABLET | Refills: 1 | Status: SHIPPED | OUTPATIENT
Start: 2023-10-17

## 2023-10-17 NOTE — TELEPHONE ENCOUNTER
Patient reports HR 37, B/P 150/90 yesterday. Held Coreg last night and this morning. Today 190/89, HR 50. Per Dr Kaitlynn Mejia, reduce Coreg to 6.25 mg and change losartan to Hyzaar 100/25mg. Patient advised and voices understanding.

## 2023-10-27 ENCOUNTER — INITIAL CONSULT (OUTPATIENT)
Dept: CARDIOLOGY CLINIC | Age: 83
End: 2023-10-27

## 2023-10-27 VITALS
WEIGHT: 186 LBS | BODY MASS INDEX: 26.04 KG/M2 | HEART RATE: 51 BPM | SYSTOLIC BLOOD PRESSURE: 136 MMHG | HEIGHT: 71 IN | DIASTOLIC BLOOD PRESSURE: 66 MMHG

## 2023-10-27 DIAGNOSIS — I49.5 TACHYCARDIA-BRADYCARDIA (HCC): Primary | ICD-10-CM

## 2023-10-27 DIAGNOSIS — I10 ESSENTIAL HYPERTENSION: ICD-10-CM

## 2023-10-27 NOTE — PROGRESS NOTES
Electrophysiology Consult Note      Reason for consultation:  atrial fibrillation    Chief complaint : Dizziness    Referring physician:       Primary care physician: Sonia Kingston MD      History of Present Illness:     Patient is 80-year-old male with a history of hypertension, hyperlipidemia, coronary artery disease, PAF referred by Dr. Christa De La Fuente for tachybradycardia episodes. Patient reports dizziness off-and-on and also has complaints of palpitations which come off and on. 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 Patient denies fever or chills. Patient reports he has started a new medication and making him even more dizzier. He has not completely passed out yet. Does drink caffeine. Denies alcohol use. Pastmedical history:   Past Medical History:   Diagnosis Date    Cardiomyopathy [facility])     Depressive disorder     major    Essential hypertension     Fall 10/2013    Was walking and missed his step and fell. H/O cardiovascular stress test 07/08/2016    EF 53% Normal study    H/O echocardiogram 03/11/2013    EF =>55%, abnormal LV diastolic function Stage 1, mild mitral and triuspid regurg,normal LV systolic function, no pericarial effusion. H/O exercise stress test 05/14/2013    EXERCISE STRESS-negative stress test    History of echocardiogram 08/20/2018    Left ventricular systolic function is normal with an ejection fraction of 55-60%. Mild concentric left ventricular hypertrophy. The left atrium is mildly dilated. No significant valvular disease or diastolic dysfunction noted. No evidence of pericardial effusion.     History of Holter monitoring 7/14/14    1 episode of a-fib x 11 hours and 58 minutes    Hx of Venous Doppler ultrasound 11/09/2020    No significant reflux in RLE, Significant reflux in LGSV, No DVT or SVT    Impotence     Osteopenia     PAF

## 2023-10-27 NOTE — PATIENT INSTRUCTIONS
Please be informed that if you contact our office outside of normal business hours the physician on call cannot help with any scheduling or rescheduling issues, procedure instruction questions or any type of medication issue. We advise you for any urgent/emergency that you go to the nearest emergency room! PLEASE CALL OUR OFFICE DURING NORMAL BUSINESS HOURS    Monday - Friday   8 am to 5 pm    [place]: [address]: [phone]    [place]:  [phone]    **It is YOUR responsibilty to bring medication bottles and/or updated medication list to [address] [place] [address]. This will allow us to better serve you and all your healthcare needs**    Thank you for allowing us to care for you today! We want to ensure we can follow your treatment plan and we strive to give you the best outcomes and experience possible. If you ever have a life threatening emergency and call 911 - for an ambulance (EMS)   Our providers can only care for you at:   [facility] or [facility]. Even if you have someone take you or you drive yourself we can only care for you in a [facility] facility. Our providers are not setup at the other healthcare locations! [address] [facility] Laboratory Locations - No appointment necessary. ? indicates the location is open Saturdays in addition to Monday through Friday. Call your preferred location for test preparation, business hours and other information you need. [employer] accepts [employer]. [facility]    ? [place] [address] [address] [place] [address]. [facility], [address] [place] [address] Avenue    Ph: [address] [facility]    Ph: [phone]   ? [address] [place] [address] Road.,    [address]    Ph: [address], [address] [facility] [address]    Ph: [phone] ? [place]   [address]   Ph: [phone]  ? [facility]

## 2023-11-07 ENCOUNTER — NURSE ONLY (OUTPATIENT)
Dept: CARDIOLOGY CLINIC | Age: 83
End: 2023-11-07

## 2023-11-07 ENCOUNTER — HOSPITAL ENCOUNTER (OUTPATIENT)
Age: 83
Discharge: HOME | End: 2023-11-07
Payer: MEDICARE

## 2023-11-07 ENCOUNTER — HOSPITAL ENCOUNTER (OUTPATIENT)
Dept: GENERAL RADIOLOGY | Age: 83
Discharge: HOME | End: 2023-11-07
Payer: MEDICARE

## 2023-11-07 DIAGNOSIS — Z01.818 PREOP EXAMINATION: ICD-10-CM

## 2023-11-07 DIAGNOSIS — I49.5 TACHY-BRADY SYNDROME (HCC): Primary | ICD-10-CM

## 2023-11-07 LAB
ANION GAP SERPL CALCULATED.3IONS-SCNC: 9 MMOL/L (ref 4–16)
APTT: 37.4 SECONDS (ref 25.1–37.1)
BASOPHILS ABSOLUTE: 0.1 K/CU MM
BASOPHILS RELATIVE PERCENT: 1.3 % (ref 0–1)
BUN SERPL-MCNC: 16 MG/DL (ref 6–23)
CALCIUM SERPL-MCNC: 9.3 MG/DL (ref 8.3–10.6)
CHLORIDE BLD-SCNC: 102 MMOL/L (ref 99–110)
CO2: 25 MMOL/L (ref 21–32)
CREAT SERPL-MCNC: 0.8 MG/DL (ref 0.9–1.3)
DIFFERENTIAL TYPE: ABNORMAL
EOSINOPHILS ABSOLUTE: 0.1 K/CU MM
EOSINOPHILS RELATIVE PERCENT: 2.2 % (ref 0–3)
GFR SERPL CREATININE-BSD FRML MDRD: >60 ML/MIN/1.73M2
GLUCOSE SERPL-MCNC: 118 MG/DL (ref 70–99)
HCT VFR BLD CALC: 40 % (ref 42–52)
HEMOGLOBIN: 12.4 GM/DL (ref 13.5–18)
IMMATURE NEUTROPHIL %: 0.4 % (ref 0–0.43)
INR BLD: 1.5 INDEX
LYMPHOCYTES ABSOLUTE: 0.7 K/CU MM
LYMPHOCYTES RELATIVE PERCENT: 15.7 % (ref 24–44)
MAGNESIUM: 1.9 MG/DL (ref 1.8–2.4)
MCH RBC QN AUTO: 29 PG (ref 27–31)
MCHC RBC AUTO-ENTMCNC: 31 % (ref 32–36)
MCV RBC AUTO: 93.7 FL (ref 78–100)
MONOCYTES ABSOLUTE: 0.3 K/CU MM
MONOCYTES RELATIVE PERCENT: 6 % (ref 0–4)
NUCLEATED RBC %: 0 %
PDW BLD-RTO: 13 % (ref 11.7–14.9)
PHOSPHORUS: 3.5 MG/DL (ref 2.5–4.9)
PLATELET # BLD: 179 K/CU MM (ref 140–440)
PMV BLD AUTO: 10 FL (ref 7.5–11.1)
POTASSIUM SERPL-SCNC: 4.4 MMOL/L (ref 3.5–5.1)
PROTHROMBIN TIME: 18.7 SECONDS (ref 11.7–14.5)
RBC # BLD: 4.27 M/CU MM (ref 4.6–6.2)
SEGMENTED NEUTROPHILS ABSOLUTE COUNT: 3.5 K/CU MM
SEGMENTED NEUTROPHILS RELATIVE PERCENT: 74.4 % (ref 36–66)
SODIUM BLD-SCNC: 136 MMOL/L (ref 135–145)
TOTAL IMMATURE NEUTOROPHIL: 0.02 K/CU MM
TOTAL NUCLEATED RBC: 0 K/CU MM
WBC # BLD: 4.6 K/CU MM (ref 4–10.5)

## 2023-11-07 PROCEDURE — 36415 COLL VENOUS BLD VENIPUNCTURE: CPT

## 2023-11-07 PROCEDURE — 84100 ASSAY OF PHOSPHORUS: CPT

## 2023-11-07 PROCEDURE — 85025 COMPLETE CBC W/AUTO DIFF WBC: CPT

## 2023-11-07 PROCEDURE — 71046 X-RAY EXAM CHEST 2 VIEWS: CPT

## 2023-11-07 PROCEDURE — 85610 PROTHROMBIN TIME: CPT

## 2023-11-07 PROCEDURE — 85730 THROMBOPLASTIN TIME PARTIAL: CPT

## 2023-11-07 PROCEDURE — 80048 BASIC METABOLIC PNL TOTAL CA: CPT

## 2023-11-07 PROCEDURE — 83735 ASSAY OF MAGNESIUM: CPT

## 2023-11-07 NOTE — PROGRESS NOTES
Patient here in office and educated on 11/7/2023, scheduled for Dual Chamber Pacemaker on 11/10/2023 @ 0700, with arrival @ [phone], @ [facility]; risk explained; and consents signed. Also copy of orders given for labs and CXR due 11/7/20223 at [facility]. Instruction given to patient to :  NPO after midnight the night before procedure; call hospital at [phone] to pre-register. May take rest of morning medications day of procedure except the following; Hold HCTZ the morning of the procedure. Hold Xarelto the evening dose night before procedure and Morning dose of the procedure. Patient voiced understanding. Copies of consent & info scanned in chart.

## 2023-11-10 ENCOUNTER — APPOINTMENT (OUTPATIENT)
Dept: GENERAL RADIOLOGY | Age: 83
End: 2023-11-10
Attending: INTERNAL MEDICINE
Payer: MEDICARE

## 2023-11-10 ENCOUNTER — HOSPITAL ENCOUNTER (OUTPATIENT)
Age: 83
Setting detail: OUTPATIENT SURGERY
Discharge: HOME | End: 2023-11-10
Attending: INTERNAL MEDICINE | Admitting: INTERNAL MEDICINE
Payer: MEDICARE

## 2023-11-10 VITALS
WEIGHT: 180 LBS | TEMPERATURE: 96.3 F | SYSTOLIC BLOOD PRESSURE: 136 MMHG | HEART RATE: 61 BPM | RESPIRATION RATE: 18 BRPM | HEIGHT: 71 IN | DIASTOLIC BLOOD PRESSURE: 66 MMHG | OXYGEN SATURATION: 95 % | BODY MASS INDEX: 25.2 KG/M2

## 2023-11-10 DIAGNOSIS — I49.5 TACHY-BRADY SYNDROME (HCC): ICD-10-CM

## 2023-11-10 LAB
ABO/RH: NORMAL
ANTIBODY SCREEN: NEGATIVE
ECHO BSA: 2.02 M2

## 2023-11-10 PROCEDURE — 6360000002 HC RX W HCPCS

## 2023-11-10 PROCEDURE — C1785 PMKR, DUAL, RATE-RESP: HCPCS | Performed by: INTERNAL MEDICINE

## 2023-11-10 PROCEDURE — 99152 MOD SED SAME PHYS/QHP 5/>YRS: CPT | Performed by: INTERNAL MEDICINE

## 2023-11-10 PROCEDURE — 6360000004 HC RX CONTRAST MEDICATION: Performed by: INTERNAL MEDICINE

## 2023-11-10 PROCEDURE — 2500000003 HC RX 250 WO HCPCS

## 2023-11-10 PROCEDURE — 33208 INSRT HEART PM ATRIAL & VENT: CPT | Performed by: INTERNAL MEDICINE

## 2023-11-10 PROCEDURE — 2580000003 HC RX 258

## 2023-11-10 PROCEDURE — 86900 BLOOD TYPING SEROLOGIC ABO: CPT

## 2023-11-10 PROCEDURE — 6360000002 HC RX W HCPCS: Performed by: INTERNAL MEDICINE

## 2023-11-10 PROCEDURE — 33208 INSRT HEART PM ATRIAL & VENT: CPT

## 2023-11-10 PROCEDURE — 2709999900 HC NON-CHARGEABLE SUPPLY: Performed by: INTERNAL MEDICINE

## 2023-11-10 PROCEDURE — C1898 LEAD, PMKR, OTHER THAN TRANS: HCPCS | Performed by: INTERNAL MEDICINE

## 2023-11-10 PROCEDURE — 99153 MOD SED SAME PHYS/QHP EA: CPT | Performed by: INTERNAL MEDICINE

## 2023-11-10 PROCEDURE — A4217 STERILE WATER/SALINE, 500 ML: HCPCS | Performed by: INTERNAL MEDICINE

## 2023-11-10 PROCEDURE — 86901 BLOOD TYPING SEROLOGIC RH(D): CPT

## 2023-11-10 PROCEDURE — 7100000011 HC PHASE II RECOVERY - ADDTL 15 MIN: Performed by: INTERNAL MEDICINE

## 2023-11-10 PROCEDURE — 86850 RBC ANTIBODY SCREEN: CPT

## 2023-11-10 PROCEDURE — 2580000003 HC RX 258: Performed by: INTERNAL MEDICINE

## 2023-11-10 PROCEDURE — 2500000003 HC RX 250 WO HCPCS: Performed by: INTERNAL MEDICINE

## 2023-11-10 PROCEDURE — 7100000010 HC PHASE II RECOVERY - FIRST 15 MIN: Performed by: INTERNAL MEDICINE

## 2023-11-10 PROCEDURE — C1892 INTRO/SHEATH,FIXED,PEEL-AWAY: HCPCS | Performed by: INTERNAL MEDICINE

## 2023-11-10 PROCEDURE — 71045 X-RAY EXAM CHEST 1 VIEW: CPT

## 2023-11-10 DEVICE — LEAD 5076-52 MRI US RCMCRD
Type: IMPLANTABLE DEVICE | Status: FUNCTIONAL
Brand: CAPSUREFIX NOVUS MRI™ SURESCAN®

## 2023-11-10 DEVICE — LEAD 5076-58 MRI US RCMCRD
Type: IMPLANTABLE DEVICE | Status: FUNCTIONAL
Brand: CAPSUREFIX NOVUS MRI™ SURESCAN®

## 2023-11-10 DEVICE — IPG W1DR01 AZURE XT DR MRI USA
Type: IMPLANTABLE DEVICE | Status: FUNCTIONAL
Brand: AZURE™ XT DR MRI SURESCAN™

## 2023-11-10 RX ORDER — SODIUM CHLORIDE 0.9 % (FLUSH) 0.9 %
5-40 SYRINGE (ML) INJECTION PRN
Status: DISCONTINUED | OUTPATIENT
Start: 2023-11-10 | End: 2023-11-13 | Stop reason: HOSPADM

## 2023-11-10 RX ORDER — FENTANYL CITRATE 50 UG/ML
INJECTION, SOLUTION INTRAMUSCULAR; INTRAVENOUS PRN
Status: DISCONTINUED | OUTPATIENT
Start: 2023-11-10 | End: 2023-11-10 | Stop reason: HOSPADM

## 2023-11-10 RX ORDER — SODIUM CHLORIDE 9 MG/ML
INJECTION, SOLUTION INTRAVENOUS CONTINUOUS PRN
Status: COMPLETED | OUTPATIENT
Start: 2023-11-10 | End: 2023-11-10

## 2023-11-10 RX ORDER — MIDAZOLAM HYDROCHLORIDE 1 MG/ML
INJECTION INTRAMUSCULAR; INTRAVENOUS PRN
Status: DISCONTINUED | OUTPATIENT
Start: 2023-11-10 | End: 2023-11-10 | Stop reason: HOSPADM

## 2023-11-10 RX ORDER — SODIUM CHLORIDE 0.9 % (FLUSH) 0.9 %
5-40 SYRINGE (ML) INJECTION EVERY 12 HOURS SCHEDULED
Status: DISCONTINUED | OUTPATIENT
Start: 2023-11-10 | End: 2023-11-13 | Stop reason: HOSPADM

## 2023-11-10 RX ORDER — SODIUM CHLORIDE 9 MG/ML
INJECTION, SOLUTION INTRAVENOUS PRN
Status: DISCONTINUED | OUTPATIENT
Start: 2023-11-10 | End: 2023-11-13 | Stop reason: HOSPADM

## 2023-11-10 RX ORDER — DOXYCYCLINE HYCLATE 100 MG
100 TABLET ORAL 2 TIMES DAILY
Qty: 10 TABLET | Refills: 0 | Status: SHIPPED | OUTPATIENT
Start: 2023-11-10 | End: 2023-11-15

## 2023-11-10 RX ORDER — ACETAMINOPHEN 325 MG/1
650 TABLET ORAL EVERY 4 HOURS PRN
Status: DISCONTINUED | OUTPATIENT
Start: 2023-11-10 | End: 2023-11-13 | Stop reason: HOSPADM

## 2023-11-10 ASSESSMENT — ENCOUNTER SYMPTOMS
COUGH: 0
CONSTIPATION: 0
CONSTIPATION: 0
ABDOMINAL PAIN: 0
BACK PAIN: 0
BLOOD IN STOOL: 0
BACK PAIN: 0
EYE PAIN: 0
SHORTNESS OF BREATH: 0
COLOR CHANGE: 0
BLOOD IN STOOL: 0
DIARRHEA: 0
WHEEZING: 0
VOMITING: 0
EYE PAIN: 0
DIARRHEA: 0
PHOTOPHOBIA: 0
CHEST TIGHTNESS: 0
NAUSEA: 0
SHORTNESS OF BREATH: 0
VOMITING: 0
PHOTOPHOBIA: 0
NAUSEA: 0
COLOR CHANGE: 0
CHEST TIGHTNESS: 0
WHEEZING: 0
COUGH: 0
ABDOMINAL PAIN: 0

## 2023-11-10 NOTE — PROGRESS NOTES
Outpatient Pharmacy Progress Note for Meds-to-Beds    Total number of Prescriptions Filled: 1  The following medications were dispensed to the patient during the discharge process:  doxycycline    Additional Documentation:  Patient picked-up the medication(s) in the OP Pharmacy      Thank you for letting us serve your patients.   [address]    [address] [place] [address], [address] [place] [address]    Phone: [phone]    Fax: [phone]

## 2023-11-10 NOTE — DISCHARGE INSTRUCTIONS
1. Avoid raising the arm above shoulder for 4 weeks  2. No driving for 10 days  3. No lifting more than 10 lbs with the left hand  4. Do not wet the area of surgery for 10 days  5. Follow up appointment with Doctor for wound check in 10 days  6. Notify Doctor if pain, fever, chills, swelling or bleeding in the surgical area  7. Please avoid sleeping on the surgical side. 8. Please do not allow anyone other than Dr Valery Singletary staff to remove or redress the surgical site. If the dressing were to fall off or fall partially off before the 10 day follow up appointment, please call the office ASAP.

## 2023-11-20 ENCOUNTER — NURSE ONLY (OUTPATIENT)
Dept: CARDIOLOGY CLINIC | Age: 83
End: 2023-11-20

## 2023-11-20 VITALS — TEMPERATURE: 97.2 F

## 2023-11-20 DIAGNOSIS — Z95.0 STATUS POST PLACEMENT OF CARDIAC PACEMAKER: Primary | ICD-10-CM

## 2023-11-20 PROCEDURE — 99024 POSTOP FOLLOW-UP VISIT: CPT | Performed by: INTERNAL MEDICINE

## 2023-11-20 NOTE — PROGRESS NOTES
Patient seen for site check post pacer implant. Dressing removed. No signs of inflammation or infection noted. Edges well approximated. Patient has no complaints of pain or discomfort. Single steri strip applied. Patient instructed to not lift arm higher than shoulder level. Instructions given on the use of [employer] for home checks.

## 2023-12-26 ENCOUNTER — TELEPHONE (OUTPATIENT)
Dept: CARDIOLOGY CLINIC | Age: 83
End: 2023-12-26

## 2023-12-26 NOTE — TELEPHONE ENCOUNTER
Called patient to advised of date for admission for anti arrhythmic medication, pt's wife having surgery on 1/19, advised patient he could be d/c'd on the 19th depending on what day he would go into hospital either a Monday or Tuesday. Patient then requested that we wait till last week of January to schedule. Advised patient I will schedule his admission for 1/29/2024. Patient agreeable with plan. Instructions given to patient that he needs to wait to be called when bed is ready but to be ready by 0930 but not to come to hospital until called. No other c/o noted at present.

## 2023-12-27 ENCOUNTER — NURSE ONLY (OUTPATIENT)
Dept: CARDIOLOGY CLINIC | Age: 83
End: 2023-12-27

## 2023-12-27 DIAGNOSIS — I48.0 PAF (PAROXYSMAL ATRIAL FIBRILLATION) (HCC): Primary | ICD-10-CM

## 2023-12-27 NOTE — PROGRESS NOTES
Direct admit called and admission for 1/29/2024 at 0930 for sotalol loading scheduled with Rusty Lemos RN.

## 2024-01-16 ENCOUNTER — OFFICE VISIT (OUTPATIENT)
Dept: CARDIOLOGY CLINIC | Age: 84
End: 2024-01-16
Payer: MEDICARE

## 2024-01-16 VITALS
HEIGHT: 71 IN | BODY MASS INDEX: 26.07 KG/M2 | HEART RATE: 66 BPM | DIASTOLIC BLOOD PRESSURE: 78 MMHG | WEIGHT: 186.2 LBS | SYSTOLIC BLOOD PRESSURE: 136 MMHG

## 2024-01-16 DIAGNOSIS — I83.93 VARICOSE VEINS OF BOTH LOWER EXTREMITIES, UNSPECIFIED WHETHER COMPLICATED: ICD-10-CM

## 2024-01-16 DIAGNOSIS — R00.1 BRADYCARDIA: ICD-10-CM

## 2024-01-16 DIAGNOSIS — I10 ESSENTIAL HYPERTENSION: ICD-10-CM

## 2024-01-16 DIAGNOSIS — I38 VHD (VALVULAR HEART DISEASE): ICD-10-CM

## 2024-01-16 DIAGNOSIS — E11.9 TYPE 2 DIABETES MELLITUS WITHOUT COMPLICATION, WITHOUT LONG-TERM CURRENT USE OF INSULIN (HCC): ICD-10-CM

## 2024-01-16 DIAGNOSIS — I48.0 PAF (PAROXYSMAL ATRIAL FIBRILLATION) (HCC): ICD-10-CM

## 2024-01-16 DIAGNOSIS — I25.119 CORONARY ARTERY DISEASE INVOLVING NATIVE CORONARY ARTERY OF NATIVE HEART WITH ANGINA PECTORIS (HCC): Primary | ICD-10-CM

## 2024-01-16 PROCEDURE — 99213 OFFICE O/P EST LOW 20 MIN: CPT | Performed by: INTERNAL MEDICINE

## 2024-01-16 PROCEDURE — 1123F ACP DISCUSS/DSCN MKR DOCD: CPT | Performed by: INTERNAL MEDICINE

## 2024-01-16 PROCEDURE — 3078F DIAST BP <80 MM HG: CPT | Performed by: INTERNAL MEDICINE

## 2024-01-16 PROCEDURE — 3075F SYST BP GE 130 - 139MM HG: CPT | Performed by: INTERNAL MEDICINE

## 2024-01-16 RX ORDER — ATORVASTATIN CALCIUM 20 MG/1
20 TABLET, FILM COATED ORAL DAILY
Qty: 90 TABLET | Refills: 1 | Status: SHIPPED | OUTPATIENT
Start: 2024-01-16

## 2024-01-16 NOTE — PATIENT INSTRUCTIONS
CORONARY ARTERY DISEASE:Yes   S/P PTCA (percutaneous transluminal coronary angioplasty) 11/15/2012     stenting of 2 sites in RCA   Recent Cardiolite abnormality is not severe enough to warrant further testing.   clinically stable. Patient is on optimal medical regimen ( see medication list above )  -      Patient is currently  asymptomatic from CAD.           - changes in  treatment:   no, on ASA           - Testing ordered:  no  [place] classification: 1     CARDIOLITE 10/2020    abnormal stress test, mildly depressed LVEF, Decreased uptake inferiorly due    to diaphragmatic artifact. Myocardial perfusion scan shows small size,    moderate intensity, partially reversible perfusion defect in inferior wall.      HTN:well controlled on current medical regimen, see list above.              - changes in  treatment:   no, on Cozaar & Amlodipine   CARDIOMYOPATHY: None known   CONGESTIVE HEART FAILURE: NO KNOWN HISTORY.         VHD: Moderate MR   ECHO 4/28/2023   Left ventricular function is mildly abnormal, EF 45-50%.   Mildly dilated bilateral ventricles.   Mildly dilated bilateral atria.   Moderate mitral and mild to moderate tricuspid regurgitation is present.   Mildly elevated pulmonary artery pressure, RVSP 36 mmHg.   Dilation of the aotic root (3.9cm).   No evidence of pericardial effusion.      DYSLIPIDEMIA: Patient's profile is at / near Goal.yes,                                 HDL is low                                    Tolerating current medical regimen wellyes, takes Lipitor                                                        See most recent Lab values in Labs section above.  Diabetes mellitis: .yes, Diet controlled.                                     Managed by family MD                                     BS under good control yes,                                      Hgb A1c avilable yes,      OTHER RELEVANT DIAGNOSIS:as noted in patient's active problem list:  CVI: S/P Left GSV ablation.

## 2024-01-16 NOTE — PROGRESS NOTES
DIAGNOSIS:as noted in patient's active problem list:  CVI: S/P Left GSV ablation.       ARRHYTHMIAS:  known but no more C/O Palpitations                                Patient has H/O Atrial fibrillation                        on anticoagulation with Xarelto.     EKG: today, Atrial fibrillation 93 /min     Bradycardia ? Developed SSS S/P PPM.  8/1/2022  Baseline rhythm is normal sinus.  Significant Bradycardias noted are seen during sleep hours.  Clinical co-relation recommended.  Patient is not having near syncope or Syncope problem.    Reportedly patient to be hospitalized later this month for initiation of Sotalol therapy.     TESTS ORDERED: none this visit.     PREVIOUSLY ORDERED TESTS REVIEWED & DISCUSSED WITH THE PATIENT:     I personally reviewed & interpreted, all previously ordered tests as copied above. Latest Labs are pulled in to the note with dates.   Labs, specially in Reference to Lipid profile, Cardiac testing in the form of Echo ( dated: ), stress tests ( dated: ) & other relevant cardiac testing reviewed with patient & recommendations made based on assessment of the results.    Discussed role of Cardiac risk factors & effects + treatment of co morbidities with patient & advised accordingly.     MEDICATIONS: List of medications patient is currently taking is reviewed in detail with the patient. Discussed any side effects or problems taking the medication.     Recommend Continue present management & medications as listed.     AFFIRMATION: I spent at least 20 minutes of time reviewing patient's history, previous & current medical problems & all Labs + testing. This includes chart prep even prior to the vosit. Various goals are discussed and multiple questions answered.Relevant concelling performed.     Office follow up in six months. Device check per protocol.

## 2024-01-22 ENCOUNTER — TELEPHONE (OUTPATIENT)
Dept: CARDIOLOGY CLINIC | Age: 84
End: 2024-01-22

## 2024-01-22 NOTE — TELEPHONE ENCOUNTER
Patient called wanted to make sure everything was set up for direct admit on Monday 1/29. Everything is scheduled and we will call him with room number once hospital calls us. May be the afternoon or next day before admitted depending on beds to discharge.

## 2024-01-29 ENCOUNTER — TELEPHONE (OUTPATIENT)
Dept: CARDIOLOGY CLINIC | Age: 84
End: 2024-01-29

## 2024-01-29 NOTE — TELEPHONE ENCOUNTER
Advised patient that we are moving direct admit for today to tomorrow 1/30. The hospital is full and will not have any beds today. We will call him tomorrow with an update and hopefully a bed. He voiced understanding

## 2024-01-30 ENCOUNTER — TELEPHONE (OUTPATIENT)
Dept: CARDIOLOGY CLINIC | Age: 84
End: 2024-01-30

## 2024-01-30 ENCOUNTER — HOSPITAL ENCOUNTER (INPATIENT)
Age: 84
LOS: 3 days | Discharge: HOME | End: 2024-02-02
Attending: INTERNAL MEDICINE | Admitting: INTERNAL MEDICINE
Payer: MEDICARE

## 2024-01-30 PROBLEM — Z79.899 ENCOUNTER FOR MONITORING SOTALOL THERAPY: Status: ACTIVE | Noted: 2024-01-30

## 2024-01-30 PROBLEM — Z51.81 ENCOUNTER FOR MONITORING SOTALOL THERAPY: Status: ACTIVE | Noted: 2024-01-30

## 2024-01-30 LAB
ANION GAP SERPL CALCULATED.3IONS-SCNC: 13 MMOL/L (ref 7–16)
BUN SERPL-MCNC: 21 MG/DL (ref 6–23)
CALCIUM SERPL-MCNC: 8.7 MG/DL (ref 8.3–10.6)
CHLORIDE BLD-SCNC: 96 MMOL/L (ref 99–110)
CO2: 24 MMOL/L (ref 21–32)
CREAT SERPL-MCNC: 1 MG/DL (ref 0.9–1.3)
GFR SERPL CREATININE-BSD FRML MDRD: >60 ML/MIN/1.73M2
GLUCOSE SERPL-MCNC: 145 MG/DL (ref 70–99)
HCT VFR BLD CALC: 35.2 % (ref 42–52)
HEMOGLOBIN: 11.6 GM/DL (ref 13.5–18)
MAGNESIUM: 1.6 MG/DL (ref 1.8–2.4)
MCH RBC QN AUTO: 30.5 PG (ref 27–31)
MCHC RBC AUTO-ENTMCNC: 33 % (ref 32–36)
MCV RBC AUTO: 92.6 FL (ref 78–100)
PDW BLD-RTO: 12.9 % (ref 11.7–14.9)
PHOSPHORUS: 3.5 MG/DL (ref 2.5–4.9)
PLATELET # BLD: 206 K/CU MM (ref 140–440)
PMV BLD AUTO: 9.8 FL (ref 7.5–11.1)
POTASSIUM SERPL-SCNC: 4.2 MMOL/L (ref 3.5–5.1)
RBC # BLD: 3.8 M/CU MM (ref 4.6–6.2)
SODIUM BLD-SCNC: 133 MMOL/L (ref 135–145)
WBC # BLD: 5.5 K/CU MM (ref 4–10.5)

## 2024-01-30 PROCEDURE — 84100 ASSAY OF PHOSPHORUS: CPT

## 2024-01-30 PROCEDURE — 2140000000 HC CCU INTERMEDIATE R&B

## 2024-01-30 PROCEDURE — 80048 BASIC METABOLIC PNL TOTAL CA: CPT

## 2024-01-30 PROCEDURE — 85027 COMPLETE CBC AUTOMATED: CPT

## 2024-01-30 PROCEDURE — 6360000002 HC RX W HCPCS: Performed by: NURSE PRACTITIONER

## 2024-01-30 PROCEDURE — 36415 COLL VENOUS BLD VENIPUNCTURE: CPT

## 2024-01-30 PROCEDURE — 6370000000 HC RX 637 (ALT 250 FOR IP): Performed by: NURSE PRACTITIONER

## 2024-01-30 PROCEDURE — 83735 ASSAY OF MAGNESIUM: CPT

## 2024-01-30 PROCEDURE — 99222 1ST HOSP IP/OBS MODERATE 55: CPT | Performed by: NURSE PRACTITIONER

## 2024-01-30 PROCEDURE — 93005 ELECTROCARDIOGRAM TRACING: CPT | Performed by: NURSE PRACTITIONER

## 2024-01-30 RX ORDER — MAGNESIUM SULFATE 4 G/50ML
4000 INJECTION INTRAVENOUS ONCE
Status: COMPLETED | OUTPATIENT
Start: 2024-01-30 | End: 2024-01-31

## 2024-01-30 RX ORDER — ALFUZOSIN HYDROCHLORIDE 10 MG/1
10 TABLET, EXTENDED RELEASE ORAL DAILY
Status: DISCONTINUED | OUTPATIENT
Start: 2024-01-31 | End: 2024-01-30 | Stop reason: CLARIF

## 2024-01-30 RX ORDER — LOSARTAN POTASSIUM AND HYDROCHLOROTHIAZIDE 12.5; 5 MG/1; MG/1
2 TABLET ORAL DAILY
Status: DISCONTINUED | OUTPATIENT
Start: 2024-01-31 | End: 2024-02-02 | Stop reason: HOSPADM

## 2024-01-30 RX ORDER — ATORVASTATIN CALCIUM 10 MG/1
20 TABLET, FILM COATED ORAL DAILY
Status: DISCONTINUED | OUTPATIENT
Start: 2024-01-31 | End: 2024-02-02 | Stop reason: HOSPADM

## 2024-01-30 RX ORDER — ASPIRIN 81 MG/1
81 TABLET, CHEWABLE ORAL DAILY
Status: DISCONTINUED | OUTPATIENT
Start: 2024-01-31 | End: 2024-02-02 | Stop reason: HOSPADM

## 2024-01-30 RX ORDER — TAMSULOSIN HYDROCHLORIDE 0.4 MG/1
0.4 CAPSULE ORAL DAILY
Status: DISCONTINUED | OUTPATIENT
Start: 2024-01-31 | End: 2024-02-02 | Stop reason: HOSPADM

## 2024-01-30 RX ORDER — SOTALOL HYDROCHLORIDE 80 MG/1
80 TABLET ORAL 2 TIMES DAILY
Status: DISCONTINUED | OUTPATIENT
Start: 2024-01-30 | End: 2024-02-02 | Stop reason: HOSPADM

## 2024-01-30 RX ORDER — LEVOTHYROXINE SODIUM 0.07 MG/1
75 TABLET ORAL DAILY
Status: DISCONTINUED | OUTPATIENT
Start: 2024-01-31 | End: 2024-02-02 | Stop reason: HOSPADM

## 2024-01-30 RX ADMIN — SOTALOL HYDROCHLORIDE 80 MG: 80 TABLET ORAL at 23:23

## 2024-01-30 RX ADMIN — MAGNESIUM SULFATE HEPTAHYDRATE 4000 MG: 80 INJECTION, SOLUTION INTRAVENOUS at 23:37

## 2024-01-30 NOTE — TELEPHONE ENCOUNTER
Called direct admit to get update on bed situation. They are still waiting on a bed they have a lot of patients in ER right now also waiting on a bed. Patient may have to wait until tomorrow or next week to be admitted. Will update patient.

## 2024-01-30 NOTE — TELEPHONE ENCOUNTER
Patient updated and states if they don't call this evening then he will probably reschedule for a couple week out. I will call patient tomorrow if not admitted to get him rescheduled.

## 2024-01-31 LAB
EKG ATRIAL RATE: 62 BPM
EKG DIAGNOSIS: NORMAL
EKG P-R INTERVAL: 242 MS
EKG Q-T INTERVAL: 422 MS
EKG QRS DURATION: 94 MS
EKG QTC CALCULATION (BAZETT): 428 MS
EKG R AXIS: -40 DEGREES
EKG T AXIS: -5 DEGREES
EKG VENTRICULAR RATE: 62 BPM
MAGNESIUM: 2.4 MG/DL (ref 1.8–2.4)

## 2024-01-31 PROCEDURE — 93005 ELECTROCARDIOGRAM TRACING: CPT | Performed by: INTERNAL MEDICINE

## 2024-01-31 PROCEDURE — 36415 COLL VENOUS BLD VENIPUNCTURE: CPT

## 2024-01-31 PROCEDURE — 2140000000 HC CCU INTERMEDIATE R&B

## 2024-01-31 PROCEDURE — 94761 N-INVAS EAR/PLS OXIMETRY MLT: CPT

## 2024-01-31 PROCEDURE — 93005 ELECTROCARDIOGRAM TRACING: CPT | Performed by: NURSE PRACTITIONER

## 2024-01-31 PROCEDURE — 6370000000 HC RX 637 (ALT 250 FOR IP): Performed by: NURSE PRACTITIONER

## 2024-01-31 PROCEDURE — 93010 ELECTROCARDIOGRAM REPORT: CPT | Performed by: INTERNAL MEDICINE

## 2024-01-31 PROCEDURE — 99232 SBSQ HOSP IP/OBS MODERATE 35: CPT | Performed by: NURSE PRACTITIONER

## 2024-01-31 PROCEDURE — 83735 ASSAY OF MAGNESIUM: CPT

## 2024-01-31 RX ADMIN — SOTALOL HYDROCHLORIDE 80 MG: 80 TABLET ORAL at 09:36

## 2024-01-31 RX ADMIN — ATORVASTATIN CALCIUM 20 MG: 10 TABLET, FILM COATED ORAL at 09:36

## 2024-01-31 RX ADMIN — TAMSULOSIN HYDROCHLORIDE 0.4 MG: 0.4 CAPSULE ORAL at 09:36

## 2024-01-31 RX ADMIN — SOTALOL HYDROCHLORIDE 80 MG: 80 TABLET ORAL at 21:39

## 2024-01-31 RX ADMIN — LEVOTHYROXINE SODIUM 75 MCG: 0.07 TABLET ORAL at 06:06

## 2024-01-31 RX ADMIN — LOSARTAN POTASSIUM AND HYDROCHLOROTHIAZIDE 2 TABLET: 50; 12.5 TABLET, FILM COATED ORAL at 09:36

## 2024-01-31 RX ADMIN — ASPIRIN 81 MG 81 MG: 81 TABLET ORAL at 09:36

## 2024-01-31 RX ADMIN — RIVAROXABAN 20 MG: 20 TABLET, FILM COATED ORAL at 17:44

## 2024-01-31 ASSESSMENT — ENCOUNTER SYMPTOMS
COUGH: 0
VOMITING: 0
SHORTNESS OF BREATH: 0
PHOTOPHOBIA: 0
BACK PAIN: 0
ABDOMINAL PAIN: 0
ABDOMINAL DISTENTION: 0
DIARRHEA: 0
CHEST TIGHTNESS: 0
NAUSEA: 0
CONSTIPATION: 0
SINUS PRESSURE: 0
SINUS PAIN: 0
BLOOD IN STOOL: 0
COLOR CHANGE: 0

## 2024-01-31 ASSESSMENT — PAIN SCALES - GENERAL: PAINLEVEL_OUTOF10: 0

## 2024-01-31 NOTE — H&P
Electrophysiology H&p  Note      Reason for consultation:  atrial fibrillation    Chief complaint: sotalol monitoring therapy    Referring physician:       Primary care physician: Henry Ruiz MD      History of Present Illness:     Patient is here for sotalol monitoring therapy. He had a pacemaker placed for tachybradycardia episodes. At the 1 month follow up post ppm placement he continued to have episodes of PAF. He was symptomatic with palpitations. He denies chest pain, lightheadedness, dizziness, edema or syncope.     Previous visit:     Patient is 83-year-old male with a history of hypertension, hyperlipidemia, coronary artery disease, PAF referred by Dr. Becerra for tachybradycardia episodes.  Patient reports dizziness off-and-on and also has complaints of palpitations which come off and on.  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  Patient denies fever or chills.  Patient reports he has started a new medication and making him even more dizzier.  He has not completely passed out yet.  Does drink caffeine.  Denies alcohol use.      Pastmedical history:   Past Medical History:   Diagnosis Date    Cardiomyopathy (HCC)     Depressive disorder     major    Essential hypertension     Fall 10/2013    Was walking and missed his step and fell.    H/O cardiovascular stress test 07/08/2016    EF 53% Normal study    H/O echocardiogram 03/11/2013    EF =>55%, abnormal LV diastolic function Stage 1, mild mitral and triuspid regurg,normal LV systolic function, no pericarial effusion.    H/O exercise stress test 05/14/2013    EXERCISE STRESS-negative stress test    History of echocardiogram 08/20/2018    Left ventricular systolic function is normal with an ejection fraction of 55-60%.Mild concentric left ventricular hypertrophy.The left atrium is mildly dilated.No significant  with the patient, Nurse practioner (NP)  and myself. I have spoken with patient, nursing staff and provided written and verbal instructions.The above note has reza reviewed and I agree with the history, physical examination and treatment plan.  Necessary changes to the note has been made in history, physical examination and plan to the above note.    Garry Devlin M.D 01/31/24

## 2024-01-31 NOTE — PROGRESS NOTES
Admit Date:  1/30/2024    Admission diagnosis / Complaint: sotalol monitoring therapy      Subjective:  Mr. Bolton is resting in bed. Denies cardiac complaints.     Objective:   /61   Pulse 60   Temp 98.7 °F (37.1 °C) (Oral)   Resp 10   Ht 1.803 m (5' 11\")   SpO2 96%   BMI 25.97 kg/m²   No intake or output data in the 24 hours ending 01/31/24 0936    TELEMETRY: atrial paced    has a past medical history of Cardiomyopathy (HCC), Depressive disorder, Essential hypertension, Fall, H/O cardiovascular stress test, H/O echocardiogram, H/O exercise stress test, History of echocardiogram, History of Holter monitoring, Hx of Venous Doppler ultrasound, Impotence, Osteopenia, PAF (paroxysmal atrial fibrillation) (HCC), Polyp of colon, S/P PTCA (percutaneous transluminal coronary angioplasty), Type 2 diabetes mellitus (HCC), Varicose veins of both lower extremities, and VHD (valvular heart disease).   has a past surgical history that includes Cholecystectomy (2009); Tonsillectomy and adenoidectomy; Percutaneous Transluminal Coronary Angio (11/2012); Anus surgery; and ep device procedure (N/A, 11/10/2023).  Physical Exam:  General:  Awake, alert, NAD  Skin:  Warm and dry  Neck:  JVD not appreciated  Chest:  Clear to auscultation, respiration easy  Cardiovascular:  RRR S1S2  Abdomen:  Soft nontender  Extremities:  no edema    Medications:    aspirin  81 mg Oral Daily    atorvastatin  20 mg Oral Daily    levothyroxine  75 mcg Oral Daily    losartan-hydroCHLOROthiazide  2 tablet Oral Daily    rivaroxaban  20 mg Oral Daily    tamsulosin  0.4 mg Oral Daily    sotalol  80 mg Oral BID           Lab Data:  CBC:   Recent Labs     01/30/24 2116   WBC 5.5   HGB 11.6*   HCT 35.2*   MCV 92.6        BMP:   Recent Labs     01/30/24 2116   *   K 4.2   CL 96*   CO2 24   PHOS 3.5   BUN 21   CREATININE 1.0     LIVER PROFILE: No results for input(s): \"AST\", \"ALT\", \"LIPASE\", \"AMYLASE\", \"ALB\", \"BILIDIR\", \"BILITOT\",

## 2024-01-31 NOTE — PROGRESS NOTES
4 Eyes Skin Assessment     NAME:  Trever Bolton  YOB: 1940  MEDICAL RECORD NUMBER:  [id number]    The patient is being assessed for  Admission    I agree that at least one RN has performed a thorough Head to Toe Skin Assessment on the patient. ALL assessment sites listed below have been assessed.      Areas assessed by both nurses:    Head, Face, Ears, Shoulders, Back, Chest, Arms, Elbows, Hands, Sacrum. Buttock, Coccyx, Ischium, Legs. Feet and Heels, and Under Medical Devices         Does the Patient have a Wound? No noted wound(s)       [place] Prevention initiated by RN: No  Wound Care Orders initiated by RN: No    Pressure Injury (Stage 3,4, Unstageable, DTI, NWPT, and Complex wounds) if present, place Wound referral order by RN under : No    New Ostomies, if present place, Ostomy referral order under : No     Nurse 1 eSignature: Electronically signed by Krishna Ascencio RN on 1/31/24 at 2:02 AM EST    **SHARE this note so that the co-signing nurse can place an eSignature**    Nurse 2 eSignature: Electronically signed by Marsha Lowry RN on 1/31/24 at 5:04 AM EST

## 2024-02-01 PROBLEM — I48.91 ATRIAL FIBRILLATION (HCC): Status: ACTIVE | Noted: 2024-02-01

## 2024-02-01 LAB
EKG ATRIAL RATE: 60 BPM
EKG ATRIAL RATE: 62 BPM
EKG ATRIAL RATE: 65 BPM
EKG DIAGNOSIS: NORMAL
EKG P AXIS: -16 DEGREES
EKG P-R INTERVAL: 252 MS
EKG P-R INTERVAL: 254 MS
EKG P-R INTERVAL: 272 MS
EKG Q-T INTERVAL: 434 MS
EKG Q-T INTERVAL: 444 MS
EKG Q-T INTERVAL: 466 MS
EKG QRS DURATION: 90 MS
EKG QRS DURATION: 92 MS
EKG QRS DURATION: 94 MS
EKG QTC CALCULATION (BAZETT): 440 MS
EKG QTC CALCULATION (BAZETT): 458 MS
EKG QTC CALCULATION (BAZETT): 466 MS
EKG R AXIS: -40 DEGREES
EKG R AXIS: -40 DEGREES
EKG R AXIS: -44 DEGREES
EKG T AXIS: -14 DEGREES
EKG T AXIS: -17 DEGREES
EKG T AXIS: 24 DEGREES
EKG VENTRICULAR RATE: 60 BPM
EKG VENTRICULAR RATE: 62 BPM
EKG VENTRICULAR RATE: 64 BPM

## 2024-02-01 PROCEDURE — 99232 SBSQ HOSP IP/OBS MODERATE 35: CPT | Performed by: NURSE PRACTITIONER

## 2024-02-01 PROCEDURE — 94761 N-INVAS EAR/PLS OXIMETRY MLT: CPT

## 2024-02-01 PROCEDURE — 6370000000 HC RX 637 (ALT 250 FOR IP): Performed by: NURSE PRACTITIONER

## 2024-02-01 PROCEDURE — 93010 ELECTROCARDIOGRAM REPORT: CPT | Performed by: INTERNAL MEDICINE

## 2024-02-01 PROCEDURE — 2140000000 HC CCU INTERMEDIATE R&B

## 2024-02-01 PROCEDURE — 93005 ELECTROCARDIOGRAM TRACING: CPT | Performed by: NURSE PRACTITIONER

## 2024-02-01 PROCEDURE — 6360000002 HC RX W HCPCS: Performed by: STUDENT IN AN ORGANIZED HEALTH CARE EDUCATION/TRAINING PROGRAM

## 2024-02-01 RX ORDER — MAGNESIUM SULFATE IN WATER 40 MG/ML
2000 INJECTION, SOLUTION INTRAVENOUS ONCE
Status: COMPLETED | OUTPATIENT
Start: 2024-02-01 | End: 2024-02-01

## 2024-02-01 RX ADMIN — ATORVASTATIN CALCIUM 20 MG: 10 TABLET, FILM COATED ORAL at 08:39

## 2024-02-01 RX ADMIN — LEVOTHYROXINE SODIUM 75 MCG: 0.07 TABLET ORAL at 06:19

## 2024-02-01 RX ADMIN — TAMSULOSIN HYDROCHLORIDE 0.4 MG: 0.4 CAPSULE ORAL at 08:39

## 2024-02-01 RX ADMIN — RIVAROXABAN 20 MG: 20 TABLET, FILM COATED ORAL at 18:35

## 2024-02-01 RX ADMIN — SOTALOL HYDROCHLORIDE 80 MG: 80 TABLET ORAL at 21:02

## 2024-02-01 RX ADMIN — LOSARTAN POTASSIUM AND HYDROCHLOROTHIAZIDE 2 TABLET: 50; 12.5 TABLET, FILM COATED ORAL at 08:38

## 2024-02-01 RX ADMIN — SOTALOL HYDROCHLORIDE 80 MG: 80 TABLET ORAL at 08:39

## 2024-02-01 RX ADMIN — MAGNESIUM SULFATE HEPTAHYDRATE 2000 MG: 40 INJECTION, SOLUTION INTRAVENOUS at 21:06

## 2024-02-01 RX ADMIN — ASPIRIN 81 MG 81 MG: 81 TABLET ORAL at 08:41

## 2024-02-01 NOTE — PLAN OF CARE
Problem: Discharge Planning  Goal: Discharge to home or other facility with appropriate resources  2/1/2024 0940 by Gabby Infante RN  Outcome: Progressing  1/31/2024 2147 by Leilani Bae RN  Outcome: Progressing     Problem: ABCDS Injury Assessment  Goal: Absence of physical injury  2/1/2024 0940 by Gabby Infante RN  Outcome: Progressing  1/31/2024 2147 by Leilani Bae RN  Outcome: Progressing     Problem: Pain  Goal: Verbalizes/displays adequate comfort level or baseline comfort level  2/1/2024 0940 by Gabby Infante RN  Outcome: Progressing  1/31/2024 2147 by Leilani Bae RN  Outcome: Progressing     Problem: Neurosensory - Adult  Goal: Achieves stable or improved neurological status  Outcome: Progressing  Goal: Achieves maximal functionality and self care  Outcome: Progressing     Problem: Respiratory - Adult  Goal: Achieves optimal ventilation and oxygenation  Outcome: Progressing     Problem: Cardiovascular - Adult  Goal: Maintains optimal cardiac output and hemodynamic stability  Outcome: Progressing  Goal: Absence of cardiac dysrhythmias or at baseline  Outcome: Progressing     Problem: Skin/Tissue Integrity - Adult  Goal: Skin integrity remains intact  Outcome: Progressing  Goal: Incisions, wounds, or drain sites healing without S/S of infection  Outcome: Progressing  Goal: Oral mucous membranes remain intact  Outcome: Progressing     Problem: Musculoskeletal - Adult  Goal: Return mobility to safest level of function  Outcome: Progressing  Goal: Return ADL status to a safe level of function  Outcome: Progressing     Problem: Gastrointestinal - Adult  Goal: Minimal or absence of nausea and vomiting  Outcome: Progressing  Goal: Maintains or returns to baseline bowel function  Outcome: Progressing  Goal: Maintains adequate nutritional intake  Outcome: Progressing     Problem: Genitourinary - Adult  Goal: Absence of urinary retention  Outcome: Progressing     Problem: Infection -

## 2024-02-01 NOTE — PROGRESS NOTES
EKG obtained, QTc 458, sotalol order parameters to give if QTc is less than or equal to 480, given per protocol, provider updated of continued therapy

## 2024-02-01 NOTE — PROGRESS NOTES
Admit Date:  1/30/2024    Admission diagnosis / Complaint: sotalol monitoring therapy      Subjective:  Mr. Bolton is resting in bed. Denies cardiac complaints.     Objective:   /74   Pulse 60   Temp 97.9 °F (36.6 °C) (Oral)   Resp 17   Ht 1.803 m (5' 11\")   Wt 85.7 kg (189 lb)   SpO2 97%   BMI 26.36 kg/m²     Intake/Output Summary (Last 24 hours) at 2/1/2024 0843  Last data filed at 2/1/2024 0807  Gross per 24 hour   Intake --   Output 850 ml   Net -850 ml       TELEMETRY: atrial paced    has a past medical history of Cardiomyopathy ([facility]), Depressive disorder, Essential hypertension, Fall, H/O cardiovascular stress test, H/O echocardiogram, H/O exercise stress test, History of echocardiogram, History of Holter monitoring, Hx of Venous Doppler ultrasound, Impotence, Osteopenia, PAF (paroxysmal atrial fibrillation) ([facility]), Polyp of colon, S/P PTCA (percutaneous transluminal coronary angioplasty), Type 2 diabetes mellitus ([facility]), Varicose veins of both lower extremities, and VHD (valvular heart disease).   has a past surgical history that includes Cholecystectomy (2009); Tonsillectomy and adenoidectomy; Percutaneous Transluminal Coronary Angio (11/2012); Anus surgery; and ep device procedure (N/A, 11/10/2023).  Physical Exam:  General:  Awake, alert, NAD  Skin:  Warm and dry  Neck:  JVD not appreciated  Chest:  Clear to auscultation, respiration easy  Cardiovascular:  RRR S1S2  Abdomen:  Soft nontender  Extremities:  no edema    Medications:    aspirin  81 mg Oral Daily    atorvastatin  20 mg Oral Daily    levothyroxine  75 mcg Oral Daily    losartan-hydroCHLOROthiazide  2 tablet Oral Daily    rivaroxaban  20 mg Oral Daily    tamsulosin  0.4 mg Oral Daily    sotalol  80 mg Oral BID           Lab Data:  CBC:   Recent Labs     01/30/24 2116   WBC 5.5   HGB 11.6*   HCT 35.2*   MCV 92.6        BMP:   Recent Labs     01/30/24 2116   *   K 4.2   CL 96*   CO2 24   PHOS 3.5   BUN 21   CREATININE 1.0

## 2024-02-02 VITALS
SYSTOLIC BLOOD PRESSURE: 107 MMHG | WEIGHT: 189 LBS | OXYGEN SATURATION: 97 % | BODY MASS INDEX: 26.46 KG/M2 | HEART RATE: 61 BPM | RESPIRATION RATE: 16 BRPM | DIASTOLIC BLOOD PRESSURE: 69 MMHG | TEMPERATURE: 98.1 F | HEIGHT: 71 IN

## 2024-02-02 LAB
EKG ATRIAL RATE: 62 BPM
EKG ATRIAL RATE: 63 BPM
EKG DIAGNOSIS: NORMAL
EKG DIAGNOSIS: NORMAL
EKG P AXIS: -27 DEGREES
EKG P-R INTERVAL: 166 MS
EKG P-R INTERVAL: 168 MS
EKG Q-T INTERVAL: 450 MS
EKG Q-T INTERVAL: 452 MS
EKG QRS DURATION: 92 MS
EKG QRS DURATION: 96 MS
EKG QTC CALCULATION (BAZETT): 456 MS
EKG QTC CALCULATION (BAZETT): 462 MS
EKG R AXIS: -30 DEGREES
EKG R AXIS: -46 DEGREES
EKG T AXIS: -22 DEGREES
EKG T AXIS: -33 DEGREES
EKG VENTRICULAR RATE: 62 BPM
EKG VENTRICULAR RATE: 63 BPM

## 2024-02-02 PROCEDURE — 6370000000 HC RX 637 (ALT 250 FOR IP): Performed by: NURSE PRACTITIONER

## 2024-02-02 PROCEDURE — 93005 ELECTROCARDIOGRAM TRACING: CPT | Performed by: NURSE PRACTITIONER

## 2024-02-02 PROCEDURE — 93010 ELECTROCARDIOGRAM REPORT: CPT | Performed by: INTERNAL MEDICINE

## 2024-02-02 PROCEDURE — 99238 HOSP IP/OBS DSCHRG MGMT 30/<: CPT | Performed by: NURSE PRACTITIONER

## 2024-02-02 RX ORDER — SOTALOL HYDROCHLORIDE 80 MG/1
80 TABLET ORAL 2 TIMES DAILY
Qty: 60 TABLET | Refills: 0 | Status: SHIPPED | OUTPATIENT
Start: 2024-02-02

## 2024-02-02 RX ADMIN — TAMSULOSIN HYDROCHLORIDE 0.4 MG: 0.4 CAPSULE ORAL at 08:28

## 2024-02-02 RX ADMIN — LOSARTAN POTASSIUM AND HYDROCHLOROTHIAZIDE 2 TABLET: 50; 12.5 TABLET, FILM COATED ORAL at 08:28

## 2024-02-02 RX ADMIN — ATORVASTATIN CALCIUM 20 MG: 10 TABLET, FILM COATED ORAL at 08:28

## 2024-02-02 RX ADMIN — ASPIRIN 81 MG 81 MG: 81 TABLET ORAL at 08:28

## 2024-02-02 RX ADMIN — LEVOTHYROXINE SODIUM 75 MCG: 0.07 TABLET ORAL at 08:28

## 2024-02-02 RX ADMIN — SOTALOL HYDROCHLORIDE 80 MG: 80 TABLET ORAL at 10:50

## 2024-02-02 ASSESSMENT — ENCOUNTER SYMPTOMS
ABDOMINAL PAIN: 0
SINUS PAIN: 0
WHEEZING: 0
CHEST TIGHTNESS: 0
CONSTIPATION: 0
SINUS PRESSURE: 0
BACK PAIN: 0
TROUBLE SWALLOWING: 0
COUGH: 0
DIARRHEA: 0
VOICE CHANGE: 0
SHORTNESS OF BREATH: 0
NAUSEA: 0
VOMITING: 0
SORE THROAT: 0
COLOR CHANGE: 0

## 2024-02-02 NOTE — DISCHARGE SUMMARY
[facility]  Discharge Summary    Trever Bolton  :  1940  MRN:  [id number]    Admit date:  2024  Discharge date:      Admitting Physician:  Garry Devlin MD    Discharge Diagnoses:      Sotalol Monitoring Therapy  PAF  Hypercoagulable due to PAF    Admission Condition:  fair    Discharged Condition:  good    Hospital Course:    Patient has a history of paroxysmal atrial fibrillation. He presented for Sotalol Monitoring Therapy.  An EKG was obtained 1 hour before each scheduled dose of Sotalol was to be given. The QTc was then called to the physician. The Sotalol was dose according to the QTc. After 72 hours the patient is stable for discharge.      Current Discharge Medication List             Details   sotalol (BETAPACE) 80 MG tablet Take 1 tablet by mouth 2 times daily  Qty: 60 tablet, Refills: 0                Details   atorvastatin (LIPITOR) 20 MG tablet Take 1 tablet by mouth daily TAKE ONE TABLET BY MOUTH DAILY  Qty: 90 tablet, Refills: 1    Associated Diagnoses: Essential hypertension      losartan-hydroCHLOROthiazide (HYZAAR) 100-25 MG per tablet Take 1 tablet by mouth daily  Qty: 90 tablet, Refills: 1      levothyroxine (SYNTHROID) 75 MCG tablet TAKE ONE TABLET BY MOUTH DAILY  Qty: 90 tablet, Refills: 1    Associated Diagnoses: Acquired hypothyroidism      memantine (NAMENDA) 10 MG tablet Take 1 tablet by mouth 2 times daily      rivaroxaban (XARELTO) 20 MG TABS tablet Take 1 tablet by mouth every 24 hours  Qty: 90 tablet, Refills: 3    Associated Diagnoses: Coronary artery disease involving native coronary artery of native heart with angina pectoris (HCC); Paroxysmal tachycardia (HCC); Cardiomyopathy (HCC); Hyperlipidemia, unspecified hyperlipidemia type; Ischemic heart disease; Palpitations; S/P PTCA (percutaneous transluminal coronary angioplasty); VHD (valvular heart disease); PAF (paroxysmal atrial fibrillation) ([facility])      alfuzosin (UROXATRAL) 10 MG SR tablet Take 1 tablet by mouth daily       aspirin 81 MG tablet Take 1 tablet by mouth daily           Consults:  none      Discharge Exam:  /69   Pulse 61   Temp 98.1 °F (36.7 °C) (Oral)   Resp 16   Ht 1.803 m (5' 11\")   Wt 85.7 kg (189 lb)   SpO2 97%   BMI 26.36 kg/m²   General appearance: alert, appears stated age, and cooperative  Head: Normocephalic, without obvious abnormality, atraumatic  Lungs: clear to auscultation bilaterally  Heart: regular rate and rhythm, S1, S2 normal, no murmur, click, rub or gallop  Extremities: extremities normal, atraumatic, no cyanosis or edema  Pulses: 2+ and symmetric  Skin: Skin color, texture, turgor normal. No rashes or lesions  Neurologic: Grossly normal    Disposition:   home    Signed:  RAMA Mcneal CNP  2/2/2024, 9:23 AM     I have seen and examined this patient personally, independently of the nurse practitioner. I have reviewed the hospital care given to date and reviewed all pertinent labs and imaging.The plan was developed mutually at the time of the visit with the patient, Nurse practioner (NP)  and myself. I have spoken with patient, nursing staff and provided written and verbal instructions.The above note has reza reviewed and I agree with the history, physical examination and treatment plan.  Necessary changes to the note has been made in history, physical examination and plan to the above note.    Garry Devlin M.D 02/02/24

## 2024-02-02 NOTE — CONSULTS
H/O cardiovascular stress test 07/08/2016    EF 53% Normal study    H/O echocardiogram 03/11/2013    EF =>55%, abnormal LV diastolic function Stage 1, mild mitral and triuspid regurg,normal LV systolic function, no pericarial effusion.    H/O exercise stress test 05/14/2013    EXERCISE STRESS-negative stress test    History of echocardiogram 08/20/2018    Left ventricular systolic function is normal with an ejection fraction of 55-60%.Mild concentric left ventricular hypertrophy.The left atrium is mildly dilated.No significant valvular disease or diastolic dysfunction noted.No evidence of pericardial effusion.    History of Holter monitoring 7/14/14    1 episode of a-fib x 11 hours and 58 minutes    Hx of Venous Doppler ultrasound 11/09/2020    No significant reflux in RLE, Significant reflux in LGSV, No DVT or SVT    Impotence     Osteopenia     PAF (paroxysmal atrial fibrillation) ([facility])     Polyp of colon     S/P PTCA (percutaneous transluminal coronary angioplasty) 11/15/2012    stenting of 2 sites in RCA    Type 2 diabetes mellitus ([facility])     Varicose veins of both lower extremities     VHD (valvular heart disease)      PSHX:  has a past surgical history that includes Cholecystectomy (2009); Tonsillectomy and adenoidectomy; Percutaneous Transluminal Coronary Angio (11/2012); Anus surgery; and ep device procedure (N/A, 11/10/2023).  Allergies:   Allergies   Allergen Reactions    Lisinopril Other (See Comments)     Ace cough.    Pcn [Penicillins] Rash     Fam HX: family history includes Depression in his daughter; Heart Surgery in his father; No Known Problems in his brother and brother; Other in his brother; Pacemaker in his father; Tuberculosis in his mother.  Soc HX:   Social History     Socioeconomic History    Marital status:    Tobacco Use    Smoking status: Former     Current packs/day: 0.00     Average packs/day: 1 pack/day for 14.8 years (14.8 ttl pk-yrs)     Types: Cigarettes     Start date:       Quit date: 10/29/1967     Years since quittin.2    Smokeless tobacco: Never   Vaping Use    Vaping Use: Never used   Substance and Sexual Activity    Alcohol use: Not Currently     Comment: no caffeine    Drug use: No    Sexual activity: Yes     Partners: Female     Comment:      Social Determinants of Health     Financial Resource Strain: Low Risk  (2021)    Overall Financial Resource Strain (CARDIA)     Difficulty of Paying Living Expenses: Not hard at all   Food Insecurity: No Food Insecurity (2024)    Hunger Vital Sign     Worried About Running Out of Food in the Last Year: Never true     Ran Out of Food in the Last Year: Never true   Transportation Needs: No Transportation Needs (2024)    PRAPARE - Transportation     Lack of Transportation (Medical): No     Lack of Transportation (Non-Medical): No   Housing Stability: Low Risk  (2024)    Housing Stability Vital Sign     Unable to Pay for Housing in the Last Year: No     Number of Places Lived in the Last Year: 1     Unstable Housing in the Last Year: No       Medications:   Medications:    aspirin  81 mg Oral Daily    atorvastatin  20 mg Oral Daily    levothyroxine  75 mcg Oral Daily    losartan-hydroCHLOROthiazide  2 tablet Oral Daily    rivaroxaban  20 mg Oral Daily    tamsulosin  0.4 mg Oral Daily    sotalol  80 mg Oral BID      Infusions:   PRN Meds:      Labs and Imaging   No results found.    CBC:   Recent Labs     24   WBC 5.5   HGB 11.6*        BMP:    Recent Labs     24   *   K 4.2   CL 96*   CO2 24   BUN 21   CREATININE 1.0   GLUCOSE 145*     Hepatic: No results for input(s): \"AST\", \"ALT\", \"ALB\", \"BILITOT\", \"ALKPHOS\" in the last 72 hours.  Lipids:   Lab Results   Component Value Date/Time    CHOL 115 2021 12:00 AM    HDL 26 2021 12:00 AM    TRIG 133 2021 12:00 AM     Hemoglobin A1C:   Lab Results   Component Value Date/Time    LABA1C 6.2 2021 12:00 AM

## 2024-02-02 NOTE — PROGRESS NOTES
Outpatient Pharmacy Progress Note for Meds-to-Beds    Total number of Prescriptions Filled: 1  The following medications were dispensed to the patient during the discharge process:  sotalol    Additional Documentation:  Patient picked-up the medication(s) in the OP Pharmacy      Thank you for letting [place] [address]    Phone: [phone]    Fax: [phone]

## 2024-02-02 NOTE — PROGRESS NOTES
V2.0  [facility] Hospitalist Progress Note      Name:  Trever Bolton /Age/Sex: 1940  (83 y.o. male)   MRN & CSN:  [id number] & [id number] Encounter Date/Time: 2024 2:07 PM EST    Location:  [facility]12-A PCP: Henry Ruiz MD       Hospital Day: 4    Assessment and Plan:   Trever Bolton is a 83 y.o. 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  Tolerated well  Continue Xarelto    Hypertension  Continue Hyzaar    Hypothyroidism  Continue Synthroid    BPH  Continue Flomax    Diet ADULT DIET; Regular   DVT Prophylaxis [] Lovenox, []  Heparin, [] SCDs, [] Ambulation,    [] Eliquis, [x] Xarelto  [] Coumadin [] other   Code Status Full Code   Disposition From: home  Expected Disposition: home  Estimated Date of Discharge: medically ready to DC   Patient requires continued admission due to Sotalol initiation   Surrogate Decision Maker/ POA      Personally reviewed Lab Studies and Imaging     Discussed management of the case with EP who recommended discharge to home    EKG interpreted personally and results paced rhythm    Drugs that require monitoring for toxicity include Sotalol and the method of monitoring was ekg's    Subjective:     Chief Complaint: No chief complaint on file.     Patient with no acute complaints this morning.  Discussed with the EP and he is tolerating the initiation of sotalol well.  I agree with discharge to home.    Review of Systems:    Review of Systems   Constitutional:  Negative for activity change, appetite change, chills, fatigue and fever.   HENT:  Negative for congestion, hearing loss, sinus pressure, sinus pain, sore throat, trouble swallowing and voice change.    Eyes:  Negative for visual disturbance.   Respiratory:  Negative for cough, chest tightness, shortness of breath and wheezing.    Cardiovascular:   Mood and Affect: Mood normal.         Behavior: Behavior normal.         Thought Content: Thought content normal.         Medications:   Medications:    aspirin  81 mg Oral Daily    atorvastatin  20 mg Oral Daily    levothyroxine  75 mcg Oral Daily    losartan-hydroCHLOROthiazide  2 tablet Oral Daily    rivaroxaban  20 mg Oral Daily    tamsulosin  0.4 mg Oral Daily    sotalol  80 mg Oral BID      Infusions:   PRN Meds:      Labs      Recent Results (from the past 24 hour(s))   EKG 12 Lead    Collection Time: 02/01/24  8:46 PM   Result Value Ref Range    Ventricular Rate 62 BPM    Atrial Rate 62 BPM    P-R Interval 166 ms    QRS Duration 92 ms    Q-T Interval 450 ms    QTc Calculation (Bazett) 456 ms    P Axis -27 degrees    R Axis -30 degrees    T Axis -22 degrees    Diagnosis       Normal sinus rhythm  Left axis deviation  Abnormal ECG  When compared with ECG of 01-FEB-2024 08:36,  Sinus rhythm has replaced Electronic atrial pacemaker     EKG 12 Lead    Collection Time: 02/02/24  8:46 AM   Result Value Ref Range    Ventricular Rate 63 BPM    Atrial Rate 63 BPM    P-R Interval 168 ms    QRS Duration 96 ms    Q-T Interval 452 ms    QTc Calculation (Bazett) 462 ms    R Axis -46 degrees    T Axis -33 degrees    Diagnosis       Sinus rhythm with premature atrial complexes  Left anterior fascicular block  Nonspecific T wave abnormality  Abnormal ECG  When compared with ECG of 01-FEB-2024 20:46,  premature atrial complexes are now present          Imaging/Diagnostics Last 24 Hours   No results found.    Comment: Please note this report has been produced using speech recognition software and may contain errors related to that system including errors in grammar, punctuation, and spelling, as well as words and phrases that may be inappropriate. If there are any questions or concerns please feel free to contact the dictating provider for clarification.     Electronically signed by Lenin Maldonado MD on 2/2/2024 at

## 2024-02-02 NOTE — PLAN OF CARE
Problem: Discharge Planning  Goal: Discharge to home or other facility with appropriate resources  Outcome: Progressing     Problem: ABCDS Injury Assessment  Goal: Absence of physical injury  Outcome: Progressing     Problem: Pain  Goal: Verbalizes/displays adequate comfort level or baseline comfort level  Outcome: Progressing  Flowsheets (Taken 2/1/2024 1605 by Gabby Infante RN)  Verbalizes/displays adequate comfort level or baseline comfort level:   Encourage patient to monitor pain and request assistance   Assess pain using appropriate pain scale   Administer analgesics based on type and severity of pain and evaluate response   Notify Licensed Independent Practitioner if interventions unsuccessful or patient reports new pain   Consider cultural and social influences on pain and pain management     Problem: Neurosensory - Adult  Goal: Achieves stable or improved neurological status  Outcome: Progressing  Goal: Achieves maximal functionality and self care  Outcome: Progressing     Problem: Respiratory - Adult  Goal: Achieves optimal ventilation and oxygenation  Outcome: Progressing     Problem: Cardiovascular - Adult  Goal: Maintains optimal cardiac output and hemodynamic stability  Outcome: Progressing  Goal: Absence of cardiac dysrhythmias or at baseline  Outcome: Progressing     Problem: Skin/Tissue Integrity - Adult  Goal: Skin integrity remains intact  Outcome: Progressing  Goal: Incisions, wounds, or drain sites healing without S/S of infection  Outcome: Progressing  Goal: Oral mucous membranes remain intact  Outcome: Progressing     Problem: Musculoskeletal - Adult  Goal: Return mobility to safest level of function  Outcome: Progressing  Goal: Return ADL status to a safe level of function  Outcome: Progressing     Problem: Gastrointestinal - Adult  Goal: Minimal or absence of nausea and vomiting  Outcome: Progressing  Goal: Maintains or returns to baseline bowel function  Outcome: Progressing  Goal:  Maintains adequate nutritional intake  Outcome: Progressing     Problem: Genitourinary - Adult  Goal: Absence of urinary retention  Outcome: Progressing     Problem: Infection - Adult  Goal: Absence of infection at discharge  Outcome: Progressing  Goal: Absence of infection during hospitalization  Outcome: Progressing     Problem: Metabolic/Fluid and Electrolytes - Adult  Goal: Electrolytes maintained within normal limits  Outcome: Progressing  Goal: Hemodynamic stability and optimal renal function maintained  Outcome: Progressing     Problem: Hematologic - Adult  Goal: Maintains hematologic stability  Outcome: Progressing

## 2024-02-02 NOTE — PLAN OF CARE
Problem: Discharge Planning  Goal: Discharge to home or other facility with appropriate resources  2/2/2024 0921 by Fay Castillo RN  Outcome: Completed  2/2/2024 0327 by Benny Sutherland RN  Outcome: Progressing     Problem: ABCDS Injury Assessment  Goal: Absence of physical injury  2/2/2024 0921 by Fay Castillo RN  Outcome: Completed  2/2/2024 0327 by Benny Sutherland RN  Outcome: Progressing     Problem: Pain  Goal: Verbalizes/displays adequate comfort level or baseline comfort level  2/2/2024 0921 by Fay Castillo RN  Outcome: Completed  2/2/2024 0327 by Benny Sutherland RN  Outcome: Progressing  Flowsheets (Taken 2/1/2024 1605 by Gabby Infante RN)  Verbalizes/displays adequate comfort level or baseline comfort level:   Encourage patient to monitor pain and request assistance   Assess pain using appropriate pain scale   Administer analgesics based on type and severity of pain and evaluate response   Notify Licensed Independent Practitioner if interventions unsuccessful or patient reports new pain   Consider cultural and social influences on pain and pain management     Problem: Neurosensory - Adult  Goal: Achieves stable or improved neurological status  2/2/2024 0921 by Fay Castillo RN  Outcome: Completed  2/2/2024 0327 by Benny Sutherland RN  Outcome: Progressing  Goal: Achieves maximal functionality and self care  2/2/2024 0921 by Fay Castillo RN  Outcome: Completed  2/2/2024 0327 by Benny Sutherland RN  Outcome: Progressing     Problem: Respiratory - Adult  Goal: Achieves optimal ventilation and oxygenation  2/2/2024 0921 by Fay Castillo RN  Outcome: Completed  2/2/2024 0327 by Benny Sutherland RN  Outcome: Progressing     Problem: Cardiovascular - Adult  Goal: Maintains optimal cardiac output and hemodynamic stability  2/2/2024 0921 by Fay Castillo RN  Outcome: Completed  2/2/2024 0327 by Benny Sutherland RN  Outcome: Progressing  Goal: Absence of cardiac dysrhythmias or at baseline  2/2/2024 0921 by Fay Castillo

## 2024-02-12 ENCOUNTER — PROCEDURE VISIT (OUTPATIENT)
Dept: CARDIOLOGY CLINIC | Age: 84
End: 2024-02-12

## 2024-02-12 DIAGNOSIS — Z95.0 CARDIAC PACEMAKER IN SITU: Primary | ICD-10-CM

## 2024-02-13 ENCOUNTER — OFFICE VISIT (OUTPATIENT)
Dept: CARDIOLOGY CLINIC | Age: 84
End: 2024-02-13
Payer: MEDICARE

## 2024-02-13 VITALS
SYSTOLIC BLOOD PRESSURE: 126 MMHG | HEART RATE: 64 BPM | BODY MASS INDEX: 26.29 KG/M2 | WEIGHT: 187.8 LBS | HEIGHT: 71 IN | DIASTOLIC BLOOD PRESSURE: 72 MMHG

## 2024-02-13 DIAGNOSIS — I25.119 CORONARY ARTERY DISEASE INVOLVING NATIVE CORONARY ARTERY OF NATIVE HEART WITH ANGINA PECTORIS (HCC): Primary | ICD-10-CM

## 2024-02-13 PROCEDURE — 99214 OFFICE O/P EST MOD 30 MIN: CPT | Performed by: NURSE PRACTITIONER

## 2024-02-13 PROCEDURE — 3074F SYST BP LT 130 MM HG: CPT | Performed by: NURSE PRACTITIONER

## 2024-02-13 PROCEDURE — 93288 INTERROG EVL PM/LDLS PM IP: CPT | Performed by: NURSE PRACTITIONER

## 2024-02-13 PROCEDURE — 1123F ACP DISCUSS/DSCN MKR DOCD: CPT | Performed by: NURSE PRACTITIONER

## 2024-02-13 PROCEDURE — 3078F DIAST BP <80 MM HG: CPT | Performed by: NURSE PRACTITIONER

## 2024-02-13 RX ORDER — SOTALOL HYDROCHLORIDE 80 MG/1
80 TABLET ORAL 2 TIMES DAILY
Qty: 60 TABLET | Refills: 3 | Status: SHIPPED | OUTPATIENT
Start: 2024-02-13

## 2024-02-13 NOTE — PROGRESS NOTES
hrs/day    The patient is pacemaker dependent.      Overall patient is doing well at this time.  Patient to follow-up in 3 months or sooner if needed    RAMA Mcneal - CNP, 2/13/2024 9:28 AM

## 2024-02-13 NOTE — PATIENT INSTRUCTIONS
Please be informed that if you contact our office outside of normal business hours the physician on call cannot help with any scheduling or rescheduling issues, procedure instruction questions or any type of medication issue.    We advise you for any urgent/emergency that you go to the nearest emergency room!    PLEASE CALL OUR OFFICE DURING NORMAL BUSINESS HOURS    Monday - Friday   8 am to 5 pm    [place]: [phone]    [place]: [phone]    [place]:  [phone]    **It is YOUR responsibilty to bring medication bottles and/or updated medication list to EACH APPOINTMENT. This will allow us to better serve you and all your healthcare needs**    Thank you for allowing us to care for you today!   We want to ensure we can follow your treatment plan and we strive to give you the best outcomes and experience possible.   If you ever have a life threatening emergency and call 911 - for an ambulance (EMS)   Our providers can only care for you at:   [facility] or [facility].   Even if you have someone take you or you drive yourself we can only care for you in a [facility] facility. Our providers are not setup at the other healthcare locations!

## 2024-05-14 ENCOUNTER — TELEPHONE (OUTPATIENT)
Dept: CARDIOLOGY CLINIC | Age: 84
End: 2024-05-14

## 2024-05-20 ENCOUNTER — PROCEDURE VISIT (OUTPATIENT)
Dept: CARDIOLOGY CLINIC | Age: 84
End: 2024-05-20

## 2024-05-20 DIAGNOSIS — Z95.0 CARDIAC PACEMAKER IN SITU: Primary | ICD-10-CM

## 2024-05-30 ENCOUNTER — OFFICE VISIT (OUTPATIENT)
Dept: CARDIOLOGY CLINIC | Age: 84
End: 2024-05-30

## 2024-05-30 VITALS
DIASTOLIC BLOOD PRESSURE: 80 MMHG | HEIGHT: 70 IN | BODY MASS INDEX: 26 KG/M2 | WEIGHT: 181.6 LBS | HEART RATE: 64 BPM | SYSTOLIC BLOOD PRESSURE: 136 MMHG

## 2024-05-30 DIAGNOSIS — Z79.899 ENCOUNTER FOR MONITORING SOTALOL THERAPY: Primary | ICD-10-CM

## 2024-05-30 DIAGNOSIS — Z95.0 PACEMAKER: ICD-10-CM

## 2024-05-30 DIAGNOSIS — Z51.81 ENCOUNTER FOR MONITORING SOTALOL THERAPY: Primary | ICD-10-CM

## 2024-05-30 DIAGNOSIS — I48.0 PAF (PAROXYSMAL ATRIAL FIBRILLATION) (HCC): ICD-10-CM

## 2024-05-30 DIAGNOSIS — I10 ESSENTIAL HYPERTENSION: ICD-10-CM

## 2024-05-30 RX ORDER — SOTALOL HYDROCHLORIDE 80 MG/1
80 TABLET ORAL 2 TIMES DAILY
Qty: 180 TABLET | Refills: 1 | Status: SHIPPED | OUTPATIENT
Start: 2024-05-30

## 2024-05-30 ASSESSMENT — ENCOUNTER SYMPTOMS
SINUS PAIN: 0
COUGH: 0
BLOOD IN STOOL: 0
SHORTNESS OF BREATH: 0
COLOR CHANGE: 0
SINUS PRESSURE: 0
ABDOMINAL DISTENTION: 0
BACK PAIN: 0
PHOTOPHOBIA: 0
ABDOMINAL PAIN: 0

## 2024-05-30 NOTE — PROGRESS NOTES
smoking use included cigarettes. He started smoking about 71 years ago. He has a 14.8 pack-year smoking history. He has never used smokeless tobacco. He reports that he does not currently use alcohol. He reports that he does not use drugs.    Allergies   Allergen Reactions    Lisinopril Other (See Comments)     Ace cough.    Pcn [Penicillins] Rash       Current Outpatient Medications on File Prior to Visit   Medication Sig Dispense Refill    sotalol (BETAPACE) 80 MG tablet Take 1 tablet by mouth 2 times daily 60 tablet 3    atorvastatin (LIPITOR) 20 MG tablet Take 1 tablet by mouth daily TAKE ONE TABLET BY MOUTH DAILY 90 tablet 1    levothyroxine (SYNTHROID) 75 MCG tablet TAKE ONE TABLET BY MOUTH DAILY 90 tablet 1    alfuzosin (UROXATRAL) 10 MG SR tablet Take 1 tablet by mouth daily      aspirin 81 MG tablet Take 1 tablet by mouth daily      losartan-hydroCHLOROthiazide (HYZAAR) 100-25 MG per tablet Take 1 tablet by mouth daily (Patient not taking: Reported on 5/30/2024) 90 tablet 1    memantine (NAMENDA) 10 MG tablet Take 1 tablet by mouth 2 times daily (Patient not taking: Reported on 12/19/2023)      rivaroxaban (XARELTO) 20 MG TABS tablet Take 1 tablet by mouth every 24 hours 90 tablet 3     No current facility-administered medications on file prior to visit.       Review of Systems:   Review of Systems   Constitutional:  Negative for fatigue and fever.   HENT:  Negative for congestion, sinus pressure and sinus pain.    Eyes:  Negative for photophobia and visual disturbance.   Respiratory:  Negative for cough and shortness of breath.    Cardiovascular:  Negative for chest pain, palpitations and leg swelling.   Gastrointestinal:  Negative for abdominal distention, abdominal pain and blood in stool.   Endocrine: Negative for cold intolerance and heat intolerance.   Genitourinary:  Negative for difficulty urinating, dysuria and hematuria.   Musculoskeletal:  Positive for arthralgias. Negative for back pain.   Skin:

## 2024-07-26 DIAGNOSIS — I10 ESSENTIAL HYPERTENSION: ICD-10-CM

## 2024-07-29 DIAGNOSIS — I25.119 CORONARY ARTERY DISEASE INVOLVING NATIVE CORONARY ARTERY OF NATIVE HEART WITH ANGINA PECTORIS (HCC): Primary | ICD-10-CM

## 2024-07-29 DIAGNOSIS — I10 ESSENTIAL HYPERTENSION: ICD-10-CM

## 2024-07-29 RX ORDER — ATORVASTATIN CALCIUM 20 MG/1
20 TABLET, FILM COATED ORAL DAILY
Qty: 90 TABLET | Refills: 1 | Status: SHIPPED | OUTPATIENT
Start: 2024-07-29

## 2024-07-29 RX ORDER — ATORVASTATIN CALCIUM 20 MG/1
20 TABLET, FILM COATED ORAL DAILY
Qty: 90 TABLET | Refills: 0 | OUTPATIENT
Start: 2024-07-29

## 2024-08-02 ENCOUNTER — HOSPITAL ENCOUNTER (OUTPATIENT)
Age: 84
Discharge: HOME | End: 2024-08-02
Payer: MEDICARE

## 2024-08-02 DIAGNOSIS — I10 ESSENTIAL HYPERTENSION: ICD-10-CM

## 2024-08-02 DIAGNOSIS — I25.119 CORONARY ARTERY DISEASE INVOLVING NATIVE CORONARY ARTERY OF NATIVE HEART WITH ANGINA PECTORIS (HCC): ICD-10-CM

## 2024-08-02 LAB
CHOLEST SERPL-MCNC: 134 MG/DL
HDLC SERPL-MCNC: 29 MG/DL
LDLC SERPL CALC-MCNC: 76 MG/DL
TRIGL SERPL-MCNC: 143 MG/DL

## 2024-08-02 PROCEDURE — 80061 LIPID PANEL: CPT

## 2024-08-02 PROCEDURE — 36415 COLL VENOUS BLD VENIPUNCTURE: CPT

## 2024-08-07 ENCOUNTER — OFFICE VISIT (OUTPATIENT)
Dept: CARDIOLOGY CLINIC | Age: 84
End: 2024-08-07
Payer: MEDICARE

## 2024-08-07 VITALS
SYSTOLIC BLOOD PRESSURE: 134 MMHG | BODY MASS INDEX: 25.2 KG/M2 | DIASTOLIC BLOOD PRESSURE: 60 MMHG | HEART RATE: 66 BPM | WEIGHT: 180 LBS | HEIGHT: 71 IN

## 2024-08-07 DIAGNOSIS — E78.00 PURE HYPERCHOLESTEROLEMIA: ICD-10-CM

## 2024-08-07 DIAGNOSIS — I83.93 VARICOSE VEINS OF BOTH LOWER EXTREMITIES, UNSPECIFIED WHETHER COMPLICATED: ICD-10-CM

## 2024-08-07 DIAGNOSIS — I48.0 PAROXYSMAL ATRIAL FIBRILLATION (HCC): ICD-10-CM

## 2024-08-07 DIAGNOSIS — E11.9 TYPE 2 DIABETES MELLITUS WITHOUT COMPLICATION, WITHOUT LONG-TERM CURRENT USE OF INSULIN (HCC): ICD-10-CM

## 2024-08-07 DIAGNOSIS — I48.0 PAF (PAROXYSMAL ATRIAL FIBRILLATION) (HCC): ICD-10-CM

## 2024-08-07 DIAGNOSIS — R00.1 BRADYCARDIA: ICD-10-CM

## 2024-08-07 DIAGNOSIS — I38 VHD (VALVULAR HEART DISEASE): ICD-10-CM

## 2024-08-07 DIAGNOSIS — I10 ESSENTIAL HYPERTENSION: ICD-10-CM

## 2024-08-07 DIAGNOSIS — Z95.0 S/P PLACEMENT OF CARDIAC PACEMAKER: ICD-10-CM

## 2024-08-07 DIAGNOSIS — I25.119 CORONARY ARTERY DISEASE INVOLVING NATIVE CORONARY ARTERY OF NATIVE HEART WITH ANGINA PECTORIS (HCC): Primary | ICD-10-CM

## 2024-08-07 PROCEDURE — 3075F SYST BP GE 130 - 139MM HG: CPT | Performed by: INTERNAL MEDICINE

## 2024-08-07 PROCEDURE — 3078F DIAST BP <80 MM HG: CPT | Performed by: INTERNAL MEDICINE

## 2024-08-07 PROCEDURE — 1123F ACP DISCUSS/DSCN MKR DOCD: CPT | Performed by: INTERNAL MEDICINE

## 2024-08-07 PROCEDURE — 99213 OFFICE O/P EST LOW 20 MIN: CPT | Performed by: INTERNAL MEDICINE

## 2024-08-07 PROCEDURE — 93000 ELECTROCARDIOGRAM COMPLETE: CPT | Performed by: INTERNAL MEDICINE

## 2024-08-07 RX ORDER — AMLODIPINE BESYLATE 5 MG/1
5 TABLET ORAL DAILY
COMMUNITY

## 2024-08-07 RX ORDER — ATORVASTATIN CALCIUM 20 MG/1
20 TABLET, FILM COATED ORAL DAILY
Qty: 90 TABLET | Refills: 1 | Status: SHIPPED | OUTPATIENT
Start: 2024-08-07

## 2024-08-07 NOTE — PATIENT INSTRUCTIONS
CORONARY ARTERY DISEASE:Yes   S/P PTCA (percutaneous transluminal coronary angioplasty) 11/15/2012     stenting of 2 sites in RCA   Recent Cardiolite abnormality is not severe enough to warrant further testing.   clinically stable. Patient is on optimal medical regimen ( see medication list above )  -      Patient is currently  asymptomatic from CAD.           - changes in  treatment:   no, on ASA           - Testing ordered:  no  [place] classification: 1     CARDIOLITE 10/2020    abnormal stress test, mildly depressed LVEF, Decreased uptake inferiorly due    to diaphragmatic artifact. Myocardial perfusion scan shows small size,    moderate intensity, partially reversible perfusion defect in inferior wall.      HTN:well controlled on current medical regimen, see list above.              - changes in  treatment:   no, on Cozaar & Amlodipine   CARDIOMYOPATHY: None known   CONGESTIVE HEART FAILURE: NO KNOWN HISTORY.         VHD: Moderate MR   ECHO 4/28/2023   Left ventricular function is mildly abnormal, EF 45-50%.   Mildly dilated bilateral ventricles.   Mildly dilated bilateral atria.   Moderate mitral and mild to moderate tricuspid regurgitation is present.   Mildly elevated pulmonary artery pressure, RVSP 36 mmHg.   Dilation of the aotic root (3.9cm).   No evidence of pericardial effusion.      DYSLIPIDEMIA: Patient's profile is at / near Goal.yes,                                 HDL is low                                    Tolerating current medical regimen wellyes, takes Lipitor                                                        See most recent Lab values in Labs section above.  LIPID PROFILE   [facility]06/10/2024  Component 06/10/2024 06/10/2024 01/29/2024 01/29/2024 07/28/2023 07/28/2023 03/27/2023 03/27/2023              Cholesterol, Total 108 -- -- 122 -- 122 118 --   Triglycerides 128 -- -- 252 High      -- 122 104 --   HDL-C -- -- -- 25 Low      -- 31 34 --   VLDL -- -- -- 50 High     -- 24 21

## 2024-08-07 NOTE — PROGRESS NOTES
OFFICE PROGRESS NOTES      Trever is a 83 y.o. male who has    CHIEF COMPLAINT AS FOLLOWS:  CHEST PAIN:  Patient denies any C/O chest pains at this time.      SOB: No C/O SOB at this time.              LEG EDEMA: No leg edema   PALPITATIONS: C/O brief episodes of palpitations, which are not frequent.   DIZZINESS: No C/O Dizziness   SYNCOPE: None   OTHER/ ADDITIONAL COMPLAINTS:                                     HPI: Patient is here for F/U on his CAD, VHD, CVI, PAF-Arrhythmia, HTN & Dyslipidemia problems.   CAD: Patient has known CAD. Had angioplasty in the past.  Arrhythmia: Patient has known PAF  HTN: Patient has known essential HTN. Has been treated with guideline recommended medical / physical/ diet therapy as stated below.  Dyslipidemia: Patient has known mixed dyslipidemia. Has been treated with guideline recommended medical / physical/ diet therapy as stated below.                Current Outpatient Medications   Medication Sig Dispense Refill    amLODIPine (NORVASC) 5 MG tablet Take 1 tablet by mouth daily      atorvastatin (LIPITOR) 20 MG tablet Take 1 tablet by mouth daily TAKE ONE TABLET BY MOUTH DAILY 90 tablet 1    rivaroxaban (XARELTO) 20 MG TABS tablet Take 1 tablet by mouth every 24 hours 90 tablet 3    sotalol (BETAPACE) 80 MG tablet Take 1 tablet by mouth 2 times daily 180 tablet 1    levothyroxine (SYNTHROID) 75 MCG tablet TAKE ONE TABLET BY MOUTH DAILY 90 tablet 1    alfuzosin (UROXATRAL) 10 MG SR tablet Take 1 tablet by mouth daily      aspirin 81 MG tablet Take 1 tablet by mouth daily      losartan-hydroCHLOROthiazide (HYZAAR) 100-25 MG per tablet Take 1 tablet by mouth daily (Patient not taking: Reported on 5/30/2024) 90 tablet 1    memantine (NAMENDA) 10 MG tablet Take 1 tablet by mouth 2 times daily (Patient not taking: Reported on 12/19/2023)       No current facility-administered medications for this visit.     Allergies: Lisinopril and Pcn [penicillins]  Review of Systems:

## 2024-09-06 ENCOUNTER — PROCEDURE VISIT (OUTPATIENT)
Dept: CARDIOLOGY CLINIC | Age: 84
End: 2024-09-06

## 2024-09-06 DIAGNOSIS — Z95.0 CARDIAC PACEMAKER IN SITU: Primary | ICD-10-CM

## 2024-11-01 ENCOUNTER — TELEPHONE (OUTPATIENT)
Dept: CARDIOLOGY CLINIC | Age: 84
End: 2024-11-01

## 2024-11-01 NOTE — TELEPHONE ENCOUNTER
LM for patient requesting call back to reschedule appointment on 11/29/24; our office will be closed.

## 2024-11-20 ENCOUNTER — OFFICE VISIT (OUTPATIENT)
Dept: CARDIOLOGY CLINIC | Age: 84
End: 2024-11-20

## 2024-11-20 VITALS
WEIGHT: 189 LBS | BODY MASS INDEX: 26.46 KG/M2 | SYSTOLIC BLOOD PRESSURE: 138 MMHG | HEART RATE: 65 BPM | DIASTOLIC BLOOD PRESSURE: 82 MMHG | HEIGHT: 71 IN

## 2024-11-20 DIAGNOSIS — I48.0 HYPERCOAGULABLE STATE DUE TO PAROXYSMAL ATRIAL FIBRILLATION (HCC): ICD-10-CM

## 2024-11-20 DIAGNOSIS — I48.0 PAF (PAROXYSMAL ATRIAL FIBRILLATION) (HCC): ICD-10-CM

## 2024-11-20 DIAGNOSIS — Z79.899 ENCOUNTER FOR MONITORING SOTALOL THERAPY: Primary | ICD-10-CM

## 2024-11-20 DIAGNOSIS — Z51.81 ENCOUNTER FOR MONITORING SOTALOL THERAPY: Primary | ICD-10-CM

## 2024-11-20 DIAGNOSIS — D68.69 HYPERCOAGULABLE STATE DUE TO PAROXYSMAL ATRIAL FIBRILLATION (HCC): ICD-10-CM

## 2024-11-20 DIAGNOSIS — I10 ESSENTIAL HYPERTENSION: ICD-10-CM

## 2024-11-20 DIAGNOSIS — Z95.0 S/P PLACEMENT OF CARDIAC PACEMAKER: ICD-10-CM

## 2024-11-20 RX ORDER — SOTALOL HYDROCHLORIDE 80 MG/1
80 TABLET ORAL 2 TIMES DAILY
Qty: 180 TABLET | Refills: 1 | Status: SHIPPED | OUTPATIENT
Start: 2024-11-20

## 2024-11-20 ASSESSMENT — ENCOUNTER SYMPTOMS
BLOOD IN STOOL: 0
COLOR CHANGE: 0
ABDOMINAL PAIN: 0
SINUS PAIN: 0
COUGH: 0
ABDOMINAL DISTENTION: 0
SINUS PRESSURE: 0
PHOTOPHOBIA: 0
SHORTNESS OF BREATH: 0
BACK PAIN: 0

## 2024-11-20 NOTE — PROGRESS NOTES
HCT 35.2 01/30/2024 09:16 PM     01/30/2024 09:16 PM     Lipids:  Lab Results   Component Value Date    CHOL 134 08/02/2024    TRIG 143 08/02/2024    HDL 29 (L) 08/02/2024    LDLDIRECT 53 08/25/2020     PT/INR:   Lab Results   Component Value Date/Time    INR 1.5 11/07/2023 09:48 AM        BMP:    Lab Results   Component Value Date     (L) 01/30/2024    K 4.2 01/30/2024    CL 96 (L) 01/30/2024    CO2 24 01/30/2024    BUN 21 01/30/2024     CMP:   Lab Results   Component Value Date    AST 17 12/20/2021    ALT 15 12/20/2021    BILITOT 0.9 12/20/2021    ALKPHOS 60 12/20/2021     TSH:    Lab Results   Component Value Date/Time    TSH 2.662 12/20/2021 12:00 AM       Echo 4/28/2023   Left ventricular function is mildly abnormal, EF 45-50%.   Mildly dilated bilateral ventricles.   Mildly dilated bilateral atria.   Moderate mitral and mild to moderate tricuspid regurgitation is present.   Mildly elevated pulmonary artery pressure, RVSP 36 mmHg.   Dilation of the aotic root (3.9cm).   No evidence of pericardial effusion.        EKGINTERPRETATION - EKG Interpretation:   atrial paced      Vitals:    11/20/24 1221   BP: 138/82   Site: Left Upper Arm   Position: Sitting   Cuff Size: Medium Adult   Pulse: 65   Weight: 85.7 kg (189 lb)   Height: 1.803 m (5' 11\")        IMPRESSION / RECOMMENDATIONS:     Sotalol monitoring therapy  PAF  Tachycardia Bradycardia  Mild left ventricular systolic dysfunction  CAD  HTN  Hypercoagulable secondary to atrial fibrillation       EKG reveals sinus rhythm  QTc is 428  This is within range to continue sotalol 80 mg twice daily    Device interrogated.   Patient having short PAF episodes.  Ventricular rate is controlled  Patient is asymptomatic  We will continue sotalol    Patient denies issues with bleeding.  We will continue Xarelto 20 mg daily    Vitals:    11/20/24 1221   BP: 138/82   Pulse: 65     Blood pressure is stable.  Will continue amlodpine    Continue aspirin 81 mg daily as

## 2025-01-24 PROCEDURE — 93294 REM INTERROG EVL PM/LDLS PM: CPT | Performed by: INTERNAL MEDICINE

## 2025-01-24 PROCEDURE — 93296 REM INTERROG EVL PM/IDS: CPT | Performed by: INTERNAL MEDICINE

## 2025-02-04 NOTE — PROGRESS NOTES
Patient Name: Trever Bolton  : 1940  MRN# [id number]    REASON FOR VISIT: 6 month follow  Patient Active Problem List    Diagnosis Date Noted    Bradycardia 2022    S/P placement of cardiac pacemaker 2024    Atrial fibrillation (HCC) 2024    Encounter for monitoring sotalol therapy 2024    Leg edema 2021    Primary osteoarthritis involving multiple joints     Varicose veins of both lower extremities     Impotence     Polyp of colon     Osteopenia     Type 2 diabetes mellitus (HCC)     Essential hypertension     Acquired hypothyroidism     Depressive disorder     PAF (paroxysmal atrial fibrillation) (HCC)     H/O exercise stress test 2013    H/O echocardiogram 2013    Coronary artery disease involving native coronary artery of native heart with angina pectoris ([facility]) 11/15/2012    Pure hypercholesterolemia     VHD (valvular heart disease)      CURRENT SX:     Chest Pain :    When did it begin:    How long does it last:    How severe 1-10:    Radiation:    Aggravating factors:    Relieving factors:    Associated features:    Tenderness to palp:    Shortness of Breath:    When did it start:    How many flights of stairs can they climb without SOB:    Associated features:    Orthopena; how many pillows do they sleep with under your head :    Dizziness    Palpitations:    When did it begin:    How often do they occur:    How long do they last:    Aggravating factors:    Relieving factors:    Associated features:    Any thyroid issues:    How much caffeine:    Edema    Do you Exercise??    LABS:  Lab Results   Component Value Date    CHOL 134 2024    TRIG 143 2024    HDL 29 (L) 2024    LDLDIRECT 53 2020    CHOLHDLRATIO 115 2021    TSH 2.662 2021     Hemoglobin A1C   Date Value Ref Range Status   2021 6.2 % Final      ANTIARRHYTHMIC MEDS: Betapace NEEDS EKG

## 2025-02-18 ENCOUNTER — OFFICE VISIT (OUTPATIENT)
Dept: CARDIOLOGY CLINIC | Age: 85
End: 2025-02-18
Payer: MEDICARE

## 2025-02-18 VITALS
SYSTOLIC BLOOD PRESSURE: 128 MMHG | HEART RATE: 65 BPM | DIASTOLIC BLOOD PRESSURE: 80 MMHG | HEIGHT: 71 IN | BODY MASS INDEX: 26.04 KG/M2 | WEIGHT: 186 LBS

## 2025-02-18 DIAGNOSIS — E11.9 TYPE 2 DIABETES MELLITUS WITHOUT COMPLICATION, WITHOUT LONG-TERM CURRENT USE OF INSULIN (HCC): ICD-10-CM

## 2025-02-18 DIAGNOSIS — I48.0 PAF (PAROXYSMAL ATRIAL FIBRILLATION) (HCC): ICD-10-CM

## 2025-02-18 DIAGNOSIS — I83.93 VARICOSE VEINS OF BOTH LOWER EXTREMITIES, UNSPECIFIED WHETHER COMPLICATED: ICD-10-CM

## 2025-02-18 DIAGNOSIS — I10 ESSENTIAL HYPERTENSION: ICD-10-CM

## 2025-02-18 DIAGNOSIS — E78.00 PURE HYPERCHOLESTEROLEMIA: ICD-10-CM

## 2025-02-18 DIAGNOSIS — I25.119 CORONARY ARTERY DISEASE INVOLVING NATIVE CORONARY ARTERY OF NATIVE HEART WITH ANGINA PECTORIS: Primary | ICD-10-CM

## 2025-02-18 DIAGNOSIS — I38 VHD (VALVULAR HEART DISEASE): ICD-10-CM

## 2025-02-18 DIAGNOSIS — I25.118 CORONARY ARTERY DISEASE OF NATIVE ARTERY OF NATIVE HEART WITH STABLE ANGINA PECTORIS: ICD-10-CM

## 2025-02-18 DIAGNOSIS — Z95.0 S/P PLACEMENT OF CARDIAC PACEMAKER: ICD-10-CM

## 2025-02-18 PROCEDURE — 3074F SYST BP LT 130 MM HG: CPT | Performed by: INTERNAL MEDICINE

## 2025-02-18 PROCEDURE — 3079F DIAST BP 80-89 MM HG: CPT | Performed by: INTERNAL MEDICINE

## 2025-02-18 PROCEDURE — 1123F ACP DISCUSS/DSCN MKR DOCD: CPT | Performed by: INTERNAL MEDICINE

## 2025-02-18 PROCEDURE — 93000 ELECTROCARDIOGRAM COMPLETE: CPT | Performed by: INTERNAL MEDICINE

## 2025-02-18 PROCEDURE — 1159F MED LIST DOCD IN RCRD: CPT | Performed by: INTERNAL MEDICINE

## 2025-02-18 PROCEDURE — 1160F RVW MEDS BY RX/DR IN RCRD: CPT | Performed by: INTERNAL MEDICINE

## 2025-02-18 PROCEDURE — 99214 OFFICE O/P EST MOD 30 MIN: CPT | Performed by: INTERNAL MEDICINE

## 2025-02-18 NOTE — PATIENT INSTRUCTIONS
taking is reviewed in detail with the patient. Discussed any side effects or problems taking the medication.     Recommend Continue present management & medications as listed.     AFFIRMATION: I reviewed patient's history, previous & current medical problems & all Labs + testing. This includes chart prep even prior to the vosit. Various goals are discussed and multiple questions answered.Relevant concelling performed.     Office follow up in six month

## 2025-02-18 NOTE — PROGRESS NOTES
CLINICAL STAFF DOCUMENTATION    Dr. Marcin Bolton  1940  [id number]    Have you had any Chest Pain recently? - No  Have had any Shortness of Breath - No  Have you had any dizziness - No  Have you had any palpitations recently? - No  Any thyroid issues? - Yes  How much caffeine? .1 cups decaf sometimes caff on occ  Patient is on Sotalol (Betapace)  Is the patient on any of the following medications -  None  Do you have any edema - swelling in No    When did you have your last labs drawn 8/2024  What doctor ordered Wiley/Monika Purvis  Do we have the labs in their chart Yes  Do you have a surgery or procedure scheduled in the near future - No    Pt denies smoking and drinking    Check medication list thoroughly!!! AND RECONCILE OUTSIDE MEDICATIONS  If dose has changed change the entire order not just the MG  BE SURE TO ASK PATIENT IF THEY NEED MEDICATION REFILLS  Verify Pharmacy and update if incorrect    At check out add to every patient's \"wrap up\" the following dot phrase AFTERVISITCARDIOHEARTHOUSE and ensure we explain this to our patients

## 2025-02-18 NOTE — PROGRESS NOTES
OFFICE PROGRESS NOTES      Trever is a 84 y.o. male who has    CHIEF COMPLAINT AS FOLLOWS:  CHEST PAIN: Patient denies any C/O chest pains at this time.      SOB: No C/O SOB at this time.              LEG EDEMA: No leg edema   PALPITATIONS: C/O brief episodes of palpitations, which are not frequent.   DIZZINESS: No C/O Dizziness   SYNCOPE: None   OTHER/ ADDITIONAL COMPLAINTS:                                     HPI: Patient is here for F/U on his CAD, VHD, CVI, PAF- Arrhythmia, HTN & Dyslipidemia problems.   CAD: Patient has known CAD. Had angioplasty in the past.  CVI: S/P Ablation  Arrhythmia: Patient has known PAF.  HTN: Patient has known essential HTN. Has been treated with guideline recommended medical / physical/ diet therapy as stated below.  Dyslipidemia: Patient has known mixed dyslipidemia. Has been treated with guideline recommended medical / physical/ diet therapy as stated below.                Current Outpatient Medications   Medication Sig Dispense Refill    sotalol (BETAPACE) 80 MG tablet Take 1 tablet by mouth 2 times daily 180 tablet 1    amLODIPine (NORVASC) 5 MG tablet Take 1 tablet by mouth daily      atorvastatin (LIPITOR) 20 MG tablet Take 1 tablet by mouth daily TAKE ONE TABLET BY MOUTH DAILY 90 tablet 1    rivaroxaban (XARELTO) 20 MG TABS tablet Take 1 tablet by mouth every 24 hours 90 tablet 3    levothyroxine (SYNTHROID) 75 MCG tablet TAKE ONE TABLET BY MOUTH DAILY 90 tablet 1    alfuzosin (UROXATRAL) 10 MG SR tablet Take 1 tablet by mouth daily      aspirin 81 MG tablet Take 1 tablet by mouth daily      memantine (NAMENDA) 10 MG tablet Take 1 tablet by mouth 2 times daily (Patient not taking: Reported on 12/19/2023)       No current facility-administered medications for this visit.     Allergies: Lisinopril and Pcn [penicillins]  Review of Systems:    Constitutional: Negative for diaphoresis and fatigue  Respiratory: Negative for shortness of breath  Cardiovascular: Negative for chest

## 2025-04-25 PROCEDURE — 93294 REM INTERROG EVL PM/LDLS PM: CPT | Performed by: INTERNAL MEDICINE

## 2025-04-25 PROCEDURE — 93296 REM INTERROG EVL PM/IDS: CPT | Performed by: INTERNAL MEDICINE

## 2025-04-29 ENCOUNTER — TELEPHONE (OUTPATIENT)
Age: 85
End: 2025-04-29

## 2025-04-29 NOTE — TELEPHONE ENCOUNTER
CLEVE for Patient requesting call back to reschedule office visit with Monika Purvis NP on 5/19/25.    Will try again to contact patient to reschedule.

## 2025-05-20 ENCOUNTER — OFFICE VISIT (OUTPATIENT)
Age: 85
End: 2025-05-20
Payer: MEDICARE

## 2025-05-20 VITALS
BODY MASS INDEX: 25.93 KG/M2 | WEIGHT: 185.2 LBS | DIASTOLIC BLOOD PRESSURE: 60 MMHG | SYSTOLIC BLOOD PRESSURE: 100 MMHG | HEART RATE: 61 BPM | HEIGHT: 71 IN

## 2025-05-20 DIAGNOSIS — I48.0 HYPERCOAGULABLE STATE DUE TO PAROXYSMAL ATRIAL FIBRILLATION (HCC): ICD-10-CM

## 2025-05-20 DIAGNOSIS — I48.0 PAF (PAROXYSMAL ATRIAL FIBRILLATION) (HCC): Primary | ICD-10-CM

## 2025-05-20 DIAGNOSIS — Z51.81 ENCOUNTER FOR MONITORING SOTALOL THERAPY: ICD-10-CM

## 2025-05-20 DIAGNOSIS — Z95.0 S/P PLACEMENT OF CARDIAC PACEMAKER: ICD-10-CM

## 2025-05-20 DIAGNOSIS — Z79.899 ENCOUNTER FOR MONITORING SOTALOL THERAPY: ICD-10-CM

## 2025-05-20 DIAGNOSIS — D68.69 HYPERCOAGULABLE STATE DUE TO PAROXYSMAL ATRIAL FIBRILLATION (HCC): ICD-10-CM

## 2025-05-20 DIAGNOSIS — I10 ESSENTIAL HYPERTENSION: ICD-10-CM

## 2025-05-20 PROCEDURE — 3078F DIAST BP <80 MM HG: CPT | Performed by: NURSE PRACTITIONER

## 2025-05-20 PROCEDURE — 93000 ELECTROCARDIOGRAM COMPLETE: CPT | Performed by: NURSE PRACTITIONER

## 2025-05-20 PROCEDURE — 1159F MED LIST DOCD IN RCRD: CPT | Performed by: NURSE PRACTITIONER

## 2025-05-20 PROCEDURE — 3074F SYST BP LT 130 MM HG: CPT | Performed by: NURSE PRACTITIONER

## 2025-05-20 PROCEDURE — 1123F ACP DISCUSS/DSCN MKR DOCD: CPT | Performed by: NURSE PRACTITIONER

## 2025-05-20 PROCEDURE — 99214 OFFICE O/P EST MOD 30 MIN: CPT | Performed by: NURSE PRACTITIONER

## 2025-05-20 NOTE — PROGRESS NOTES
Electrophysiology Follow up  Note      Reason for consultation:  atrial fibrillation    Chief complaint: sotalol monitoring therapy    Referring physician:       Primary care physician: Henry Ruiz MD      History of Present Illness:     This visit 5/28/2025  Patient is here today for follow-up on sotalol monitoring therapy and management of atrial fibrillation.  Patient denies chest pain, palpitations, shortness of breath, lightheadedness, dizziness, edema or syncope.  Patient denies alcohol or tobacco use.  Patient does drink 1 cup of coffee daily.    Previous visit 11/20/2024  Patient is here today for follow-up on sotalol monitoring therapy and atrial fibrillation.  Patient reports he is feeling well.  He denies chest pain, palpitations, shortness of breath, lightheadedness, dizziness, edema or syncope    Previous visit 5/30/2024  Patient here today for 3-month follow-up status post sotalol monitoring therapy and atrial fibrillation.  He states that he has been feeling stressed because his wife is in a nursing facility for rehab.  He denies chest pain, palpitations, shortness of breath, lightheadedness, dizziness, edema or syncope    Previous visit 2/13/2024  Patient is here today for 1 week follow-up status post sotalol monitoring therapy.  Patient reports he is feeling well.  He states he has not had any A-fib.  He is taking his medications as prescribed.  He denies chest pain, palpitations, shortness of breath, lightheadedness, dizziness, edema or syncope    Previous visit  Patient is here for sotalol monitoring therapy. He had a pacemaker placed for tachybradycardia episodes. At the 1 month follow up post ppm placement he continued to have episodes of PAF. He was symptomatic with palpitations. He denies chest pain, lightheadedness, dizziness, edema or syncope.     Previous visit:     Patient is 83-year-old male with a history of hypertension,

## 2025-05-22 RX ORDER — SOTALOL HYDROCHLORIDE 80 MG/1
80 TABLET ORAL 2 TIMES DAILY
Qty: 180 TABLET | Refills: 1 | Status: SHIPPED | OUTPATIENT
Start: 2025-05-22

## 2025-05-28 PROBLEM — D68.69 HYPERCOAGULABLE STATE DUE TO PAROXYSMAL ATRIAL FIBRILLATION (HCC): Status: ACTIVE | Noted: 2025-05-28

## 2025-05-28 PROBLEM — I48.0 HYPERCOAGULABLE STATE DUE TO PAROXYSMAL ATRIAL FIBRILLATION (HCC): Status: ACTIVE | Noted: 2025-05-28

## 2025-06-23 ENCOUNTER — OFFICE VISIT (OUTPATIENT)
Dept: FAMILY MEDICINE CLINIC | Age: 85
End: 2025-06-23
Payer: MEDICARE

## 2025-06-23 VITALS
BODY MASS INDEX: 25.72 KG/M2 | TEMPERATURE: 98 F | OXYGEN SATURATION: 95 % | HEART RATE: 75 BPM | SYSTOLIC BLOOD PRESSURE: 140 MMHG | DIASTOLIC BLOOD PRESSURE: 70 MMHG | WEIGHT: 184.4 LBS

## 2025-06-23 DIAGNOSIS — R42 VERTIGO: Primary | ICD-10-CM

## 2025-06-23 PROCEDURE — 1159F MED LIST DOCD IN RCRD: CPT | Performed by: PHYSICIAN ASSISTANT

## 2025-06-23 PROCEDURE — 3077F SYST BP >= 140 MM HG: CPT | Performed by: PHYSICIAN ASSISTANT

## 2025-06-23 PROCEDURE — 3078F DIAST BP <80 MM HG: CPT | Performed by: PHYSICIAN ASSISTANT

## 2025-06-23 PROCEDURE — 99213 OFFICE O/P EST LOW 20 MIN: CPT | Performed by: PHYSICIAN ASSISTANT

## 2025-06-23 PROCEDURE — 1123F ACP DISCUSS/DSCN MKR DOCD: CPT | Performed by: PHYSICIAN ASSISTANT

## 2025-06-23 RX ORDER — MECLIZINE HCL 12.5 MG 12.5 MG/1
6.125-12.5 TABLET ORAL 3 TIMES DAILY PRN
Qty: 30 TABLET | Refills: 0 | Status: SHIPPED | OUTPATIENT
Start: 2025-06-23 | End: 2025-07-03

## 2025-06-23 RX ORDER — FLUTICASONE PROPIONATE 50 MCG
2 SPRAY, SUSPENSION (ML) NASAL DAILY
Qty: 16 G | Refills: 0 | Status: SHIPPED | OUTPATIENT
Start: 2025-06-23

## 2025-06-23 ASSESSMENT — ENCOUNTER SYMPTOMS
BACK PAIN: 0
SORE THROAT: 0
PHOTOPHOBIA: 0
DIARRHEA: 0
VOMITING: 0
EYE PAIN: 0
CONSTIPATION: 0
RHINORRHEA: 0
WHEEZING: 0
BLOOD IN STOOL: 0
COUGH: 0
EYE REDNESS: 0
EYE DISCHARGE: 0
COLOR CHANGE: 0
NAUSEA: 0
CHEST TIGHTNESS: 0
SHORTNESS OF BREATH: 0
ABDOMINAL PAIN: 0

## 2025-06-23 NOTE — PROGRESS NOTES
Trever Bolton (:  1940) is a 84 y.o. male,Established patient, here for evaluation of the following chief complaint(s):    Dizziness (Pt states is having dizziness- vertigo like- ongoing since Saturday. )    ASSESSMENT/PLAN:  Assessment & Plan  1. Dizziness and vertigo.  - Symptoms present for weeks, worsened since Saturday with imbalance while walking.  - Detailed neurologic exam in office is normal, no abnormal findings.  [place] test negative.  Patient is alert and oriented.  Gradual onset for several weeks.  Low suspicion for central nervous system process at this time.  Likely peripheral source of vertigo; recent hearing aid molding change may be contributory.  - Prescribed Flonase nasal spray, 2 sprays each nostril daily. Meclizine, half to 1 tablet up to three times daily as needed, only at home due to sedative effects.  - Provided home exercises to reset inner ear crystals, perform 2-3 times daily. Referral for physical therapy if no improvement. Advised slow, cautious movement to prevent symptom exacerbation. Return for reevaluation or report to ED if significant symptom changes occur, such as vision changes, vision loss, other neurologic symptoms, unilateral hearing loss, pulsations in the ear, or unusual symptoms.     1. Vertigo  -     meclizine (ANTIVERT) 12.5 MG tablet; Take 0.5-1 tablets by mouth 3 times daily as needed for Dizziness, Disp-30 tablet, R-0Normal  -     [facility]  -     fluticasone (FLONASE) 50 MCG/ACT nasal spray; 2 sprays by Each Nostril route daily, Disp-16 g, R-0Normal      Return if symptoms worsen or fail to improve, for Follow Up.      SUBJECTIVE/OBJECTIVE:  HPI  History of Present Illness  The patient presents for evaluation of dizziness and vertigo.    Experiencing dizziness for weeks, especially with head position changes in bed. Severity increased on Saturday, causing balance issues during ambulation. Describes room spinning with head

## 2025-07-24 ENCOUNTER — TELEPHONE (OUTPATIENT)
Dept: CARDIOLOGY CLINIC | Age: 85
End: 2025-07-24

## 2025-07-24 NOTE — TELEPHONE ENCOUNTER
Tayler at [facility] called asking if Monika Purvis can review pt being on ASA and Xarelto, asking if ASA can be discontinued. Tayler can be reached at [phone], ext [phone]. States it is a secure line and health info can be left on VM.

## 2025-07-25 NOTE — TELEPHONE ENCOUNTER
Called Tayler at [facility] advised per Monika patient needs to remain on ASA. No other c/o noted.

## 2025-08-06 ENCOUNTER — TRANSCRIBE ORDERS (OUTPATIENT)
Dept: ADMINISTRATIVE | Age: 85
End: 2025-08-06

## 2025-08-06 DIAGNOSIS — H91.21 SUDDEN RIGHT HEARING LOSS: Primary | ICD-10-CM

## 2025-08-19 ENCOUNTER — OFFICE VISIT (OUTPATIENT)
Dept: CARDIOLOGY CLINIC | Age: 85
End: 2025-08-19
Payer: MEDICARE

## 2025-08-19 VITALS
HEART RATE: 72 BPM | DIASTOLIC BLOOD PRESSURE: 70 MMHG | WEIGHT: 179 LBS | SYSTOLIC BLOOD PRESSURE: 104 MMHG | BODY MASS INDEX: 25.06 KG/M2 | HEIGHT: 71 IN

## 2025-08-19 DIAGNOSIS — I83.12 VARICOSE VEINS OF BOTH LOWER EXTREMITIES WITH INFLAMMATION: ICD-10-CM

## 2025-08-19 DIAGNOSIS — I10 ESSENTIAL HYPERTENSION: ICD-10-CM

## 2025-08-19 DIAGNOSIS — E78.00 PURE HYPERCHOLESTEROLEMIA: ICD-10-CM

## 2025-08-19 DIAGNOSIS — I48.0 HYPERCOAGULABLE STATE DUE TO PAROXYSMAL ATRIAL FIBRILLATION (HCC): ICD-10-CM

## 2025-08-19 DIAGNOSIS — I83.11 VARICOSE VEINS OF BOTH LOWER EXTREMITIES WITH INFLAMMATION: ICD-10-CM

## 2025-08-19 DIAGNOSIS — I25.119 CORONARY ARTERY DISEASE INVOLVING NATIVE CORONARY ARTERY OF NATIVE HEART WITH ANGINA PECTORIS: Primary | ICD-10-CM

## 2025-08-19 DIAGNOSIS — Z95.0 S/P PLACEMENT OF CARDIAC PACEMAKER: ICD-10-CM

## 2025-08-19 DIAGNOSIS — D68.69 HYPERCOAGULABLE STATE DUE TO PAROXYSMAL ATRIAL FIBRILLATION (HCC): ICD-10-CM

## 2025-08-19 DIAGNOSIS — I38 VHD (VALVULAR HEART DISEASE): ICD-10-CM

## 2025-08-19 DIAGNOSIS — I48.0 PAF (PAROXYSMAL ATRIAL FIBRILLATION) (HCC): ICD-10-CM

## 2025-08-19 PROCEDURE — 3074F SYST BP LT 130 MM HG: CPT | Performed by: INTERNAL MEDICINE

## 2025-08-19 PROCEDURE — 99214 OFFICE O/P EST MOD 30 MIN: CPT | Performed by: INTERNAL MEDICINE

## 2025-08-19 PROCEDURE — 1160F RVW MEDS BY RX/DR IN RCRD: CPT | Performed by: INTERNAL MEDICINE

## 2025-08-19 PROCEDURE — 1159F MED LIST DOCD IN RCRD: CPT | Performed by: INTERNAL MEDICINE

## 2025-08-19 PROCEDURE — 3078F DIAST BP <80 MM HG: CPT | Performed by: INTERNAL MEDICINE

## 2025-08-19 PROCEDURE — 1123F ACP DISCUSS/DSCN MKR DOCD: CPT | Performed by: INTERNAL MEDICINE

## 2025-08-19 RX ORDER — ATORVASTATIN CALCIUM 20 MG/1
20 TABLET, FILM COATED ORAL DAILY
Qty: 90 TABLET | Refills: 1 | Status: SHIPPED | OUTPATIENT
Start: 2025-08-19

## (undated) DEVICE — Device

## (undated) DEVICE — ANGIOGRAPHY KIT CUST MANIFOLD

## (undated) DEVICE — SUTURE VCRL SZ 2-0 L27IN ABSRB UD L26MM CT-2 1/2 CIR J269H

## (undated) DEVICE — LIQUIBAND RAPID ADHESIVE 36/CS 0.8ML: Brand: MEDLINE

## (undated) DEVICE — SURGICAL PROCEDURE PACK CARD IMPL LOOP KT CUST LTX

## (undated) DEVICE — INTRO OPTISEAL GLBL7FR13CM

## (undated) DEVICE — SUTURE VCRL SZ 4-0 L18IN ABSRB UD L16MM PS-4 1/2 CIR PRIM J507G